# Patient Record
Sex: FEMALE | Race: BLACK OR AFRICAN AMERICAN | NOT HISPANIC OR LATINO | Employment: UNEMPLOYED | RURAL
[De-identification: names, ages, dates, MRNs, and addresses within clinical notes are randomized per-mention and may not be internally consistent; named-entity substitution may affect disease eponyms.]

---

## 2019-10-08 ENCOUNTER — HISTORICAL (OUTPATIENT)
Dept: ADMINISTRATIVE | Facility: HOSPITAL | Age: 69
End: 2019-10-08

## 2019-10-14 LAB
LAB AP CLINICAL INFORMATION: NORMAL
LAB AP DIAGNOSIS - HISTORICAL: NORMAL
LAB AP GROSS PATHOLOGY - HISTORICAL: NORMAL
LAB AP SPECIMEN SUBMITTED - HISTORICAL: NORMAL

## 2022-03-28 ENCOUNTER — ANESTHESIA (OUTPATIENT)
Dept: SURGERY | Facility: HOSPITAL | Age: 72
End: 2022-03-28
Payer: MEDICARE

## 2022-03-28 ENCOUNTER — HOSPITAL ENCOUNTER (OUTPATIENT)
Facility: HOSPITAL | Age: 72
Discharge: HOME | End: 2022-03-29
Admitting: STUDENT IN AN ORGANIZED HEALTH CARE EDUCATION/TRAINING PROGRAM
Payer: MEDICARE

## 2022-03-28 ENCOUNTER — ANESTHESIA EVENT (OUTPATIENT)
Dept: SURGERY | Facility: HOSPITAL | Age: 72
End: 2022-03-28
Payer: MEDICARE

## 2022-03-28 DIAGNOSIS — Z01.818 PRE-OP EVALUATION: ICD-10-CM

## 2022-03-28 DIAGNOSIS — S91.309A WOUND OF FOOT: ICD-10-CM

## 2022-03-28 DIAGNOSIS — E11.628 DIABETIC FOOT INFECTION: Primary | ICD-10-CM

## 2022-03-28 DIAGNOSIS — L08.9 DIABETIC FOOT INFECTION: Primary | ICD-10-CM

## 2022-03-28 DIAGNOSIS — E11.9 DIABETES: ICD-10-CM

## 2022-03-28 LAB
ANION GAP SERPL CALCULATED.3IONS-SCNC: 14 MMOL/L (ref 7–16)
BASOPHILS # BLD AUTO: 0.04 K/UL (ref 0–0.2)
BASOPHILS NFR BLD AUTO: 0.4 % (ref 0–1)
BUN SERPL-MCNC: 20 MG/DL (ref 7–18)
BUN/CREAT SERPL: 20 (ref 6–20)
CALCIUM SERPL-MCNC: 8.7 MG/DL (ref 8.5–10.1)
CHLORIDE SERPL-SCNC: 101 MMOL/L (ref 98–107)
CO2 SERPL-SCNC: 25 MMOL/L (ref 21–32)
CREAT SERPL-MCNC: 1 MG/DL (ref 0.55–1.02)
DIFFERENTIAL METHOD BLD: ABNORMAL
EOSINOPHIL # BLD AUTO: 0.03 K/UL (ref 0–0.5)
EOSINOPHIL NFR BLD AUTO: 0.3 % (ref 1–4)
ERYTHROCYTE [DISTWIDTH] IN BLOOD BY AUTOMATED COUNT: 13.8 % (ref 11.5–14.5)
GLUCOSE SERPL-MCNC: 194 MG/DL (ref 74–106)
GLUCOSE SERPL-MCNC: 196 MG/DL (ref 70–105)
GLUCOSE SERPL-MCNC: 205 MG/DL (ref 70–105)
HCT VFR BLD AUTO: 33.7 % (ref 38–47)
HGB BLD-MCNC: 10.8 G/DL (ref 12–16)
IMM GRANULOCYTES # BLD AUTO: 0.13 K/UL (ref 0–0.04)
IMM GRANULOCYTES NFR BLD: 1.4 % (ref 0–0.4)
LYMPHOCYTES # BLD AUTO: 1.3 K/UL (ref 1–4.8)
LYMPHOCYTES NFR BLD AUTO: 14.3 % (ref 27–41)
MCH RBC QN AUTO: 26.9 PG (ref 27–31)
MCHC RBC AUTO-ENTMCNC: 32 G/DL (ref 32–36)
MCV RBC AUTO: 83.8 FL (ref 80–96)
MONOCYTES # BLD AUTO: 0.84 K/UL (ref 0–0.8)
MONOCYTES NFR BLD AUTO: 9.3 % (ref 2–6)
MPC BLD CALC-MCNC: 9.3 FL (ref 9.4–12.4)
NEUTROPHILS # BLD AUTO: 6.73 K/UL (ref 1.8–7.7)
NEUTROPHILS NFR BLD AUTO: 74.3 % (ref 53–65)
NRBC # BLD AUTO: 0 X10E3/UL
NRBC, AUTO (.00): 0 %
PLATELET # BLD AUTO: 377 K/UL (ref 150–400)
POTASSIUM SERPL-SCNC: 4.4 MMOL/L (ref 3.5–5.1)
RBC # BLD AUTO: 4.02 M/UL (ref 4.2–5.4)
SARS-COV-2 RDRP RESP QL NAA+PROBE: NEGATIVE
SODIUM SERPL-SCNC: 136 MMOL/L (ref 136–145)
WBC # BLD AUTO: 9.07 K/UL (ref 4.5–11)

## 2022-03-28 PROCEDURE — 88305 TISSUE EXAM BY PATHOLOGIST: CPT | Mod: SUR | Performed by: STUDENT IN AN ORGANIZED HEALTH CARE EDUCATION/TRAINING PROGRAM

## 2022-03-28 PROCEDURE — 36000706: Performed by: STUDENT IN AN ORGANIZED HEALTH CARE EDUCATION/TRAINING PROGRAM

## 2022-03-28 PROCEDURE — 37000008 HC ANESTHESIA 1ST 15 MINUTES: Performed by: STUDENT IN AN ORGANIZED HEALTH CARE EDUCATION/TRAINING PROGRAM

## 2022-03-28 PROCEDURE — D9220A PRA ANESTHESIA: ICD-10-PCS | Mod: ANES,ICN,, | Performed by: ANESTHESIOLOGY

## 2022-03-28 PROCEDURE — 27000284 HC CANNULA NASAL: Performed by: ANESTHESIOLOGY

## 2022-03-28 PROCEDURE — 99214 PR OFFICE/OUTPT VISIT, EST, LEVL IV, 30-39 MIN: ICD-10-PCS | Mod: 57,,, | Performed by: STUDENT IN AN ORGANIZED HEALTH CARE EDUCATION/TRAINING PROGRAM

## 2022-03-28 PROCEDURE — 99284 EMERGENCY DEPT VISIT MOD MDM: CPT | Mod: ,,, | Performed by: NURSE PRACTITIONER

## 2022-03-28 PROCEDURE — 82962 GLUCOSE BLOOD TEST: CPT

## 2022-03-28 PROCEDURE — 28810 PR AMPUTATION METATARSAL+TOE,SINGLE: ICD-10-PCS | Mod: T1,,, | Performed by: STUDENT IN AN ORGANIZED HEALTH CARE EDUCATION/TRAINING PROGRAM

## 2022-03-28 PROCEDURE — 99285 EMERGENCY DEPT VISIT HI MDM: CPT | Mod: 25

## 2022-03-28 PROCEDURE — 63600175 PHARM REV CODE 636 W HCPCS: Performed by: NURSE PRACTITIONER

## 2022-03-28 PROCEDURE — 85025 COMPLETE CBC W/AUTO DIFF WBC: CPT | Performed by: NURSE PRACTITIONER

## 2022-03-28 PROCEDURE — D9220A PRA ANESTHESIA: Mod: CRNA,,, | Performed by: ANESTHESIOLOGY

## 2022-03-28 PROCEDURE — 27201423 OPTIME MED/SURG SUP & DEVICES STERILE SUPPLY: Performed by: STUDENT IN AN ORGANIZED HEALTH CARE EDUCATION/TRAINING PROGRAM

## 2022-03-28 PROCEDURE — 87635 SARS-COV-2 COVID-19 AMP PRB: CPT | Performed by: NURSE PRACTITIONER

## 2022-03-28 PROCEDURE — 88305 TISSUE EXAM BY PATHOLOGIST: CPT | Mod: 26,,, | Performed by: PATHOLOGY

## 2022-03-28 PROCEDURE — 99214 OFFICE O/P EST MOD 30 MIN: CPT | Mod: 57,,, | Performed by: STUDENT IN AN ORGANIZED HEALTH CARE EDUCATION/TRAINING PROGRAM

## 2022-03-28 PROCEDURE — 80048 BASIC METABOLIC PNL TOTAL CA: CPT | Performed by: NURSE PRACTITIONER

## 2022-03-28 PROCEDURE — 25000003 PHARM REV CODE 250: Performed by: NURSE PRACTITIONER

## 2022-03-28 PROCEDURE — 99284 PR EMERGENCY DEPT VISIT,LEVEL IV: ICD-10-PCS | Mod: ,,, | Performed by: NURSE PRACTITIONER

## 2022-03-28 PROCEDURE — 36000707: Performed by: STUDENT IN AN ORGANIZED HEALTH CARE EDUCATION/TRAINING PROGRAM

## 2022-03-28 PROCEDURE — 36415 COLL VENOUS BLD VENIPUNCTURE: CPT | Performed by: NURSE PRACTITIONER

## 2022-03-28 PROCEDURE — 25000003 PHARM REV CODE 250: Performed by: ANESTHESIOLOGY

## 2022-03-28 PROCEDURE — D9220A PRA ANESTHESIA: Mod: ANES,ICN,, | Performed by: ANESTHESIOLOGY

## 2022-03-28 PROCEDURE — 28810 AMPUTATION TOE & METATARSAL: CPT | Mod: T1,,, | Performed by: STUDENT IN AN ORGANIZED HEALTH CARE EDUCATION/TRAINING PROGRAM

## 2022-03-28 PROCEDURE — 88311 SURGICAL PATHOLOGY: ICD-10-PCS | Mod: 26,,, | Performed by: PATHOLOGY

## 2022-03-28 PROCEDURE — 88311 DECALCIFY TISSUE: CPT | Mod: 26,,, | Performed by: PATHOLOGY

## 2022-03-28 PROCEDURE — 88305 SURGICAL PATHOLOGY: ICD-10-PCS | Mod: 26,,, | Performed by: PATHOLOGY

## 2022-03-28 PROCEDURE — 88305 TISSUE EXAM BY PATHOLOGIST: CPT | Mod: 26,XU,, | Performed by: PATHOLOGY

## 2022-03-28 PROCEDURE — D9220A PRA ANESTHESIA: ICD-10-PCS | Mod: CRNA,,, | Performed by: ANESTHESIOLOGY

## 2022-03-28 PROCEDURE — 37000009 HC ANESTHESIA EA ADD 15 MINS: Performed by: STUDENT IN AN ORGANIZED HEALTH CARE EDUCATION/TRAINING PROGRAM

## 2022-03-28 PROCEDURE — 25000003 PHARM REV CODE 250: Performed by: STUDENT IN AN ORGANIZED HEALTH CARE EDUCATION/TRAINING PROGRAM

## 2022-03-28 PROCEDURE — 71000033 HC RECOVERY, INTIAL HOUR: Performed by: STUDENT IN AN ORGANIZED HEALTH CARE EDUCATION/TRAINING PROGRAM

## 2022-03-28 PROCEDURE — 63600175 PHARM REV CODE 636 W HCPCS: Performed by: ANESTHESIOLOGY

## 2022-03-28 RX ORDER — TRAMADOL HYDROCHLORIDE 50 MG/1
50 TABLET ORAL EVERY 6 HOURS PRN
COMMUNITY
End: 2022-10-17

## 2022-03-28 RX ORDER — ONDANSETRON 2 MG/ML
4 INJECTION INTRAMUSCULAR; INTRAVENOUS EVERY 12 HOURS PRN
Status: DISCONTINUED | OUTPATIENT
Start: 2022-03-28 | End: 2022-03-29 | Stop reason: HOSPADM

## 2022-03-28 RX ORDER — ESTRADIOL 0.5 MG/1
0.5 TABLET ORAL DAILY
COMMUNITY

## 2022-03-28 RX ORDER — GABAPENTIN 300 MG/1
600 CAPSULE ORAL 3 TIMES DAILY
Status: DISCONTINUED | OUTPATIENT
Start: 2022-03-28 | End: 2022-03-29 | Stop reason: HOSPADM

## 2022-03-28 RX ORDER — SODIUM CHLORIDE 9 MG/ML
INJECTION, SOLUTION INTRAVENOUS CONTINUOUS
Status: DISCONTINUED | OUTPATIENT
Start: 2022-03-28 | End: 2022-03-29 | Stop reason: HOSPADM

## 2022-03-28 RX ORDER — GLUCAGON 1 MG
1 KIT INJECTION
Status: DISCONTINUED | OUTPATIENT
Start: 2022-03-28 | End: 2022-03-29 | Stop reason: HOSPADM

## 2022-03-28 RX ORDER — OXYCODONE HYDROCHLORIDE 5 MG/1
5 TABLET ORAL
Status: DISCONTINUED | OUTPATIENT
Start: 2022-03-28 | End: 2022-03-28 | Stop reason: HOSPADM

## 2022-03-28 RX ORDER — SODIUM CHLORIDE, SODIUM LACTATE, POTASSIUM CHLORIDE, CALCIUM CHLORIDE 600; 310; 30; 20 MG/100ML; MG/100ML; MG/100ML; MG/100ML
INJECTION, SOLUTION INTRAVENOUS CONTINUOUS
Status: DISCONTINUED | OUTPATIENT
Start: 2022-03-28 | End: 2022-03-28

## 2022-03-28 RX ORDER — HEPARIN SODIUM 5000 [USP'U]/ML
5000 INJECTION, SOLUTION INTRAVENOUS; SUBCUTANEOUS EVERY 8 HOURS
Status: DISCONTINUED | OUTPATIENT
Start: 2022-03-28 | End: 2022-03-29 | Stop reason: HOSPADM

## 2022-03-28 RX ORDER — ATORVASTATIN CALCIUM 10 MG/1
10 TABLET, FILM COATED ORAL DAILY
Status: DISCONTINUED | OUTPATIENT
Start: 2022-03-28 | End: 2022-03-29 | Stop reason: HOSPADM

## 2022-03-28 RX ORDER — PREDNISOLONE ACETATE 10 MG/ML
1 SUSPENSION/ DROPS OPHTHALMIC 2 TIMES DAILY
Status: DISCONTINUED | OUTPATIENT
Start: 2022-03-28 | End: 2022-03-29 | Stop reason: HOSPADM

## 2022-03-28 RX ORDER — INSULIN GLARGINE 100 [IU]/ML
60 INJECTION, SOLUTION SUBCUTANEOUS
COMMUNITY
End: 2022-10-17 | Stop reason: SDUPTHER

## 2022-03-28 RX ORDER — LIDOCAINE HYDROCHLORIDE 20 MG/ML
INJECTION, SOLUTION EPIDURAL; INFILTRATION; INTRACAUDAL; PERINEURAL
Status: DISCONTINUED | OUTPATIENT
Start: 2022-03-28 | End: 2022-03-28

## 2022-03-28 RX ORDER — LORATADINE 10 MG/1
10 TABLET ORAL DAILY
COMMUNITY

## 2022-03-28 RX ORDER — GABAPENTIN 600 MG/1
600 TABLET ORAL 3 TIMES DAILY
COMMUNITY
End: 2023-05-09 | Stop reason: SDUPTHER

## 2022-03-28 RX ORDER — KETOROLAC TROMETHAMINE 30 MG/ML
15 INJECTION, SOLUTION INTRAMUSCULAR; INTRAVENOUS EVERY 6 HOURS PRN
Status: DISCONTINUED | OUTPATIENT
Start: 2022-03-28 | End: 2022-03-29 | Stop reason: HOSPADM

## 2022-03-28 RX ORDER — METRONIDAZOLE 500 MG/1
500 TABLET ORAL 3 TIMES DAILY
COMMUNITY
End: 2022-10-17

## 2022-03-28 RX ORDER — CEFAZOLIN SODIUM 1 G/3ML
INJECTION, POWDER, FOR SOLUTION INTRAMUSCULAR; INTRAVENOUS
Status: DISCONTINUED | OUTPATIENT
Start: 2022-03-28 | End: 2022-03-28

## 2022-03-28 RX ORDER — ACETAMINOPHEN 325 MG/1
650 TABLET ORAL EVERY 4 HOURS PRN
Status: DISCONTINUED | OUTPATIENT
Start: 2022-03-28 | End: 2022-03-29 | Stop reason: HOSPADM

## 2022-03-28 RX ORDER — ESTRADIOL 0.5 MG/1
0.5 TABLET ORAL DAILY
Status: DISCONTINUED | OUTPATIENT
Start: 2022-03-28 | End: 2022-03-29 | Stop reason: HOSPADM

## 2022-03-28 RX ORDER — FENTANYL CITRATE 50 UG/ML
INJECTION, SOLUTION INTRAMUSCULAR; INTRAVENOUS
Status: DISCONTINUED | OUTPATIENT
Start: 2022-03-28 | End: 2022-03-28

## 2022-03-28 RX ORDER — BUPIVACAINE HYDROCHLORIDE 2.5 MG/ML
INJECTION, SOLUTION EPIDURAL; INFILTRATION; INTRACAUDAL
Status: DISCONTINUED | OUTPATIENT
Start: 2022-03-28 | End: 2022-03-28 | Stop reason: HOSPADM

## 2022-03-28 RX ORDER — INSULIN ASPART 100 [IU]/ML
1-10 INJECTION, SOLUTION INTRAVENOUS; SUBCUTANEOUS
Status: DISCONTINUED | OUTPATIENT
Start: 2022-03-28 | End: 2022-03-29 | Stop reason: HOSPADM

## 2022-03-28 RX ORDER — ONDANSETRON 2 MG/ML
4 INJECTION INTRAMUSCULAR; INTRAVENOUS DAILY PRN
Status: DISCONTINUED | OUTPATIENT
Start: 2022-03-28 | End: 2022-03-28 | Stop reason: HOSPADM

## 2022-03-28 RX ORDER — MEPERIDINE HYDROCHLORIDE 25 MG/ML
25 INJECTION INTRAMUSCULAR; INTRAVENOUS; SUBCUTANEOUS EVERY 10 MIN PRN
Status: DISCONTINUED | OUTPATIENT
Start: 2022-03-28 | End: 2022-03-28 | Stop reason: HOSPADM

## 2022-03-28 RX ORDER — METOCLOPRAMIDE HYDROCHLORIDE 5 MG/ML
5 INJECTION INTRAMUSCULAR; INTRAVENOUS EVERY 6 HOURS PRN
Status: DISCONTINUED | OUTPATIENT
Start: 2022-03-28 | End: 2022-03-29 | Stop reason: HOSPADM

## 2022-03-28 RX ORDER — DICLOFENAC SODIUM AND MISOPROSTOL 75; 200 MG/1; UG/1
1 TABLET, DELAYED RELEASE ORAL 2 TIMES DAILY
COMMUNITY

## 2022-03-28 RX ORDER — METFORMIN HYDROCHLORIDE 1000 MG/1
1000 TABLET ORAL 2 TIMES DAILY WITH MEALS
COMMUNITY

## 2022-03-28 RX ORDER — MIDAZOLAM HYDROCHLORIDE 1 MG/ML
INJECTION INTRAMUSCULAR; INTRAVENOUS
Status: DISCONTINUED | OUTPATIENT
Start: 2022-03-28 | End: 2022-03-28

## 2022-03-28 RX ORDER — SIMVASTATIN 40 MG/1
40 TABLET, FILM COATED ORAL NIGHTLY
COMMUNITY
End: 2022-10-17

## 2022-03-28 RX ORDER — MORPHINE SULFATE 10 MG/ML
4 INJECTION INTRAMUSCULAR; INTRAVENOUS; SUBCUTANEOUS EVERY 5 MIN PRN
Status: DISCONTINUED | OUTPATIENT
Start: 2022-03-28 | End: 2022-03-28 | Stop reason: HOSPADM

## 2022-03-28 RX ORDER — TALC
6 POWDER (GRAM) TOPICAL NIGHTLY PRN
Status: DISCONTINUED | OUTPATIENT
Start: 2022-03-28 | End: 2022-03-29 | Stop reason: HOSPADM

## 2022-03-28 RX ORDER — TRAMADOL HYDROCHLORIDE 50 MG/1
50 TABLET ORAL EVERY 6 HOURS PRN
Status: DISCONTINUED | OUTPATIENT
Start: 2022-03-28 | End: 2022-03-29 | Stop reason: HOSPADM

## 2022-03-28 RX ORDER — MORPHINE SULFATE 4 MG/ML
4 INJECTION, SOLUTION INTRAMUSCULAR; INTRAVENOUS EVERY 4 HOURS PRN
Status: DISCONTINUED | OUTPATIENT
Start: 2022-03-28 | End: 2022-03-29 | Stop reason: HOSPADM

## 2022-03-28 RX ORDER — LEVOFLOXACIN 500 MG/1
500 TABLET, FILM COATED ORAL DAILY
COMMUNITY
End: 2022-10-17

## 2022-03-28 RX ORDER — ACETAMINOPHEN 325 MG/1
650 TABLET ORAL EVERY 8 HOURS PRN
Status: DISCONTINUED | OUTPATIENT
Start: 2022-03-28 | End: 2022-03-29 | Stop reason: HOSPADM

## 2022-03-28 RX ORDER — DIPHENHYDRAMINE HYDROCHLORIDE 50 MG/ML
25 INJECTION INTRAMUSCULAR; INTRAVENOUS EVERY 6 HOURS PRN
Status: DISCONTINUED | OUTPATIENT
Start: 2022-03-28 | End: 2022-03-28 | Stop reason: HOSPADM

## 2022-03-28 RX ORDER — HYDROMORPHONE HYDROCHLORIDE 2 MG/ML
0.5 INJECTION, SOLUTION INTRAMUSCULAR; INTRAVENOUS; SUBCUTANEOUS EVERY 5 MIN PRN
Status: DISCONTINUED | OUTPATIENT
Start: 2022-03-28 | End: 2022-03-28 | Stop reason: HOSPADM

## 2022-03-28 RX ORDER — PROPOFOL 10 MG/ML
VIAL (ML) INTRAVENOUS
Status: DISCONTINUED | OUTPATIENT
Start: 2022-03-28 | End: 2022-03-28

## 2022-03-28 RX ADMIN — GABAPENTIN 600 MG: 300 CAPSULE ORAL at 08:03

## 2022-03-28 RX ADMIN — MIDAZOLAM 1 MG: 1 INJECTION INTRAMUSCULAR; INTRAVENOUS at 03:03

## 2022-03-28 RX ADMIN — PROPOFOL 25 MG: 10 INJECTION, EMULSION INTRAVENOUS at 04:03

## 2022-03-28 RX ADMIN — FENTANYL CITRATE 50 MCG: 50 INJECTION INTRAMUSCULAR; INTRAVENOUS at 04:03

## 2022-03-28 RX ADMIN — PIPERACILLIN AND TAZOBACTAM 4.5 G: 4; .5 INJECTION, POWDER, LYOPHILIZED, FOR SOLUTION INTRAVENOUS; PARENTERAL at 06:03

## 2022-03-28 RX ADMIN — PROPOFOL 50 MG: 10 INJECTION, EMULSION INTRAVENOUS at 03:03

## 2022-03-28 RX ADMIN — HEPARIN SODIUM 5000 UNITS: 5000 INJECTION INTRAVENOUS; SUBCUTANEOUS at 11:03

## 2022-03-28 RX ADMIN — SODIUM CHLORIDE: 9 INJECTION, SOLUTION INTRAVENOUS at 02:03

## 2022-03-28 RX ADMIN — LIDOCAINE HYDROCHLORIDE 50 MG: 20 INJECTION, SOLUTION INTRAVENOUS at 03:03

## 2022-03-28 RX ADMIN — CEFAZOLIN 2000 G: 1 INJECTION, POWDER, FOR SOLUTION INTRAMUSCULAR; INTRAVENOUS; PARENTERAL at 03:03

## 2022-03-28 RX ADMIN — FENTANYL CITRATE 50 MCG: 50 INJECTION INTRAMUSCULAR; INTRAVENOUS at 03:03

## 2022-03-28 RX ADMIN — DEXTROSE MONOHYDRATE 25 G: 25 INJECTION, SOLUTION INTRAVENOUS at 04:03

## 2022-03-28 RX ADMIN — SODIUM CHLORIDE: 9 INJECTION, SOLUTION INTRAVENOUS at 06:03

## 2022-03-28 NOTE — PROVIDER PROGRESS NOTES - EMERGENCY DEPT.
Encounter Date: 3/28/2022    ED Physician Progress Notes        discussed with ; he will evaluate the patient in the ED

## 2022-03-28 NOTE — TRANSFER OF CARE
"Anesthesia Transfer of Care Note    Patient: Nicol Reese    Procedure(s) Performed: Procedure(s) (LRB):  DEBRIDEMENT, LOWER EXTREMITY (Left)  AMPUTATION, TOE (Left)    Patient location: GI    Anesthesia Type: general    Transport from OR: Transported from OR on room air with adequate spontaneous ventilation    Post pain: adequate analgesia    Post assessment: no apparent anesthetic complications    Post vital signs: stable    Level of consciousness: responds to stimulation    Nausea/Vomiting: no nausea/vomiting    Complications: none    Transfer of care protocol was followed      Last vitals:   Visit Vitals  BP (!) 144/65 (BP Location: Left arm, Patient Position: Lying)   Pulse 84   Temp 36.9 °C (98.4 °F) (Oral)   Resp 14   Ht 5' 6" (1.676 m)   Wt 80.3 kg (177 lb)   SpO2 95%   Breastfeeding No   BMI 28.57 kg/m²     "

## 2022-03-28 NOTE — PROVIDER PROGRESS NOTES - EMERGENCY DEPT.
Encounter Date: 3/28/2022    ED Physician Progress Notes         at bedside, evaluated the patient, will take to surgery

## 2022-03-28 NOTE — PLAN OF CARE
1628 Rec'd pt to PACU asleep with oral airway in place. VSS. No signs of distress noted. Left foot dressing c/d/i.     1630 Oral airway removed. Respiration even and unlabored. Denies needs.     1638 Accu check 41. Dr. Johns notified and 1 amp d50 given, see MAR.    1655 Recheck accu check 196.     1708 Out of PACU. VSS. No signs of distress noted.     1715 Pt to room 449. Report given to TIFFANY Orellana RN. Left foot dressing c/d/i. No signs of distress noted. /69, P 76, R 18, O2 96% room air.

## 2022-03-28 NOTE — H&P
[facility] - Emergency Department  General Surgery  History & Physical    Patient Name: Nicol Reese  MRN: [id number]  Admission Date: 3/28/2022  Attending Physician: No att. providers found   Primary Care Provider: Alex Babcock MD    Patient information was obtained from ER records.     Subjective:     Chief Complaint/Reason for Admission: LEFT diabetic foot infection    History of Present Illness: 71-year-old female with diabetes left lower extremity edema cellulitis with infected 2nd toe general surgery consulted for surgical management  Glucose 200s, last ate at 0900      No current facility-administered medications on file prior to encounter.     Current Outpatient Medications on File Prior to Encounter   Medication Sig    diclofenac-misoprostol  mg-mcg (ARTHROTEC 75)  mg-mcg per tablet Take 1 tablet by mouth 2 (two) times daily.    estradioL (ESTRACE) 0.5 MG tablet Take 0.5 mg by mouth once daily.    gabapentin (NEURONTIN) 600 MG tablet Take 600 mg by mouth 3 (three) times daily.    insulin (LANTUS SOLOSTAR U-100 INSULIN) glargine 100 units/mL (3mL) SubQ pen Inject 68 Units into the skin.    levoFLOXacin (LEVAQUIN) 500 MG tablet Take 500 mg by mouth once daily.    loratadine (CLARITIN) 10 mg tablet Take 10 mg by mouth once daily.    metFORMIN (GLUCOPHAGE) 1000 MG tablet Take 1,000 mg by mouth 2 (two) times daily with meals.    metroNIDAZOLE (FLAGYL) 500 MG tablet Take 500 mg by mouth 3 (three) times daily.    prednisoLONE acetate (PRED FORTE) 1 % DrpS instill ONE (1) drop into right eye twice daily     simvastatin (ZOCOR) 40 MG tablet Take 40 mg by mouth every evening.    traMADoL (ULTRAM) 50 mg tablet Take 50 mg by mouth every 6 (six) hours as needed for Pain.       Review of patient's allergies indicates:  No Known Allergies    History reviewed. No pertinent past medical history.  History reviewed. No pertinent surgical history.  Family History    None       Tobacco Use     Smoking status: Never Smoker    Smokeless tobacco: Never Used   Substance and Sexual Activity    Alcohol use: Not Currently    Drug use: Not Currently    Sexual activity: Not on file     Review of Systems   Cardiovascular:  Positive for leg swelling.        LLE   Objective:     Vital Signs (Most Recent):  Temp: 98.8 °F (37.1 °C) (03/28/22 1049)  Pulse: 94 (03/28/22 1049)  Resp: 18 (03/28/22 1049)  BP: 137/63 (03/28/22 1049)  SpO2: 98 % (03/28/22 1049) Vital Signs (24h Range):  Temp:  [98.8 °F (37.1 °C)] 98.8 °F (37.1 °C)  Pulse:  [94] 94  Resp:  [18] 18  SpO2:  [98 %] 98 %  BP: (137)/(63) 137/63     Weight: 80.3 kg (177 lb)  Body mass index is 28.57 kg/m².    Physical Exam  Vitals and nursing note reviewed. Exam conducted with a chaperone present.   HENT:      Head: Normocephalic.   Eyes:      Conjunctiva/sclera: Conjunctivae normal.   Cardiovascular:      Rate and Rhythm: Normal rate.      Comments: Palpable BL DP pulses  Pulmonary:      Effort: Pulmonary effort is normal.   Abdominal:      Palpations: Abdomen is soft.   Musculoskeletal:      Left lower leg: Edema present.      Comments: Left 2nd toe edematous infectious  cellulitis   Skin:     General: Skin is warm and dry.      Capillary Refill: Capillary refill takes less than 2 seconds.   Neurological:      Mental Status: She is alert and oriented to person, place, and time.       Significant Labs:  I have reviewed all pertinent lab results within the past 24 hours.  CBC:   Recent Labs   Lab 03/28/22  1116   WBC 9.07   RBC 4.02*   HGB 10.8*   HCT 33.7*      MCV 83.8   MCH 26.9*   MCHC 32.0     BMP:   Recent Labs   Lab 03/28/22  1116   *      K 4.4      CO2 25   BUN 20*   CREATININE 1.00   CALCIUM 8.7     CMP:   Recent Labs   Lab 03/28/22  1116   *   CALCIUM 8.7      K 4.4   CO2 25      BUN 20*   CREATININE 1.00     LFTs: No results for input(s): ALT, AST, ALKPHOS, BILITOT, PROT, ALBUMIN in the last 168  hours.  Coagulation: No results for input(s): LABPROT, INR, APTT in the last 168 hours.  Cardiac markers: No results for input(s): CKMB, CPKMB, TROPONINT, TROPONINI, MYOGLOBIN in the last 168 hours.  ABGs: No results for input(s): PH, PCO2, PO2, HCO3, POCSATURATED, BE in the last 168 hours.    Significant Diagnostics:  I have reviewed all pertinent imaging results/findings within the past 24 hours.      Assessment/Plan:     * Diabetic foot infection  03/28 palpable DP pulses, edematous LLE cellulitis   IV zosyn, TO OR this afternoon for possible left 2nd toe amputation per Dr. Jacome    Diabetes  03/28 hyperglycemia 200, adult sliding scale      VTE Risk Mitigation (From admission, onward)         Ordered     heparin (porcine) injection 5,000 Units  Every 8 hours         03/28/22 1256     IP VTE HIGH RISK PATIENT  Once         03/28/22 1256     Place sequential compression device  Until discontinued         03/28/22 1256                Marcie Ramos, ANYP  General Surgery  [facility] [place] - Emergency Department

## 2022-03-28 NOTE — PLAN OF CARE
Problem: Adult Inpatient Plan of Care  Goal: Plan of Care Review  Outcome: Ongoing, Progressing  Goal: Patient-Specific Goal (Individualized)  Outcome: Ongoing, Progressing  Goal: Absence of Hospital-Acquired Illness or Injury  Outcome: Ongoing, Progressing  Goal: Optimal Comfort and Wellbeing  Outcome: Ongoing, Progressing  Goal: Readiness for Transition of Care  Outcome: Ongoing, Progressing     Problem: Diabetes Comorbidity  Goal: Blood Glucose Level Within Targeted Range  Outcome: Ongoing, Progressing     Problem: Fall Injury Risk  Goal: Absence of Fall and Fall-Related Injury  Outcome: Ongoing, Progressing    Patient progressing, will continue to monitor.

## 2022-03-28 NOTE — SUBJECTIVE & OBJECTIVE
No current facility-administered medications on file prior to encounter.     Current Outpatient Medications on File Prior to Encounter   Medication Sig    diclofenac-misoprostol  mg-mcg (ARTHROTEC 75)  mg-mcg per tablet Take 1 tablet by mouth 2 (two) times daily.    estradioL (ESTRACE) 0.5 MG tablet Take 0.5 mg by mouth once daily.    gabapentin (NEURONTIN) 600 MG tablet Take 600 mg by mouth 3 (three) times daily.    insulin (LANTUS SOLOSTAR U-100 INSULIN) glargine 100 units/mL (3mL) SubQ pen Inject 68 Units into the skin.    levoFLOXacin (LEVAQUIN) 500 MG tablet Take 500 mg by mouth once daily.    loratadine (CLARITIN) 10 mg tablet Take 10 mg by mouth once daily.    metFORMIN (GLUCOPHAGE) 1000 MG tablet Take 1,000 mg by mouth 2 (two) times daily with meals.    metroNIDAZOLE (FLAGYL) 500 MG tablet Take 500 mg by mouth 3 (three) times daily.    prednisoLONE acetate (PRED FORTE) 1 % DrpS instill ONE (1) drop into right eye twice daily     simvastatin (ZOCOR) 40 MG tablet Take 40 mg by mouth every evening.    traMADoL (ULTRAM) 50 mg tablet Take 50 mg by mouth every 6 (six) hours as needed for Pain.       Review of patient's allergies indicates:  No Known Allergies    History reviewed. No pertinent past medical history.  History reviewed. No pertinent surgical history.  Family History    None       Tobacco Use    Smoking status: Never Smoker    Smokeless tobacco: Never Used   Substance and Sexual Activity    Alcohol use: Not Currently    Drug use: Not Currently    Sexual activity: Not on file     Review of Systems   Cardiovascular:  Positive for leg swelling.        LLE   Objective:     Vital Signs (Most Recent):  Temp: 98.8 °F (37.1 °C) (03/28/22 1049)  Pulse: 94 (03/28/22 1049)  Resp: 18 (03/28/22 1049)  BP: 137/63 (03/28/22 1049)  SpO2: 98 % (03/28/22 1049) Vital Signs (24h Range):  Temp:  [98.8 °F (37.1 °C)] 98.8 °F (37.1 °C)  Pulse:  [94] 94  Resp:  [18] 18  SpO2:  [98 %] 98 %  BP: (137)/(63) 137/63      Weight: 80.3 kg (177 lb)  Body mass index is 28.57 kg/m².    Physical Exam  Vitals and nursing note reviewed. Exam conducted with a chaperone present.   HENT:      Head: Normocephalic.   Eyes:      Conjunctiva/sclera: Conjunctivae normal.   Cardiovascular:      Rate and Rhythm: Normal rate.      Comments: Palpable BL DP pulses  Pulmonary:      Effort: Pulmonary effort is normal.   Abdominal:      Palpations: Abdomen is soft.   Musculoskeletal:      Left lower leg: Edema present.      Comments: Left 2nd toe edematous infectious  cellulitis   Skin:     General: Skin is warm and dry.      Capillary Refill: Capillary refill takes less than 2 seconds.   Neurological:      Mental Status: She is alert and oriented to person, place, and time.       Significant Labs:  I have reviewed all pertinent lab results within the past 24 hours.  CBC:   Recent Labs   Lab 03/28/22  1116   WBC 9.07   RBC 4.02*   HGB 10.8*   HCT 33.7*      MCV 83.8   MCH 26.9*   MCHC 32.0     BMP:   Recent Labs   Lab 03/28/22  1116   *      K 4.4      CO2 25   BUN 20*   CREATININE 1.00   CALCIUM 8.7     CMP:   Recent Labs   Lab 03/28/22  1116   *   CALCIUM 8.7      K 4.4   CO2 25      BUN 20*   CREATININE 1.00     LFTs: No results for input(s): ALT, AST, ALKPHOS, BILITOT, PROT, ALBUMIN in the last 168 hours.  Coagulation: No results for input(s): LABPROT, INR, APTT in the last 168 hours.  Cardiac markers: No results for input(s): CKMB, CPKMB, TROPONINT, TROPONINI, MYOGLOBIN in the last 168 hours.  ABGs: No results for input(s): PH, PCO2, PO2, HCO3, POCSATURATED, BE in the last 168 hours.    Significant Diagnostics:  I have reviewed all pertinent imaging results/findings within the past 24 hours.

## 2022-03-28 NOTE — ED PROVIDER NOTES
Encounter Date: 3/28/2022       History     Chief Complaint   Patient presents with    Toe Pain     71-year-old female presents to the emergency department to be evaluating for a wound to her left second test that she noticed three days ago. She was evaluated at [facility] two days ago and given a prescription for Flagyl and Levaquin that she began taking yesterday. Denies any injury, fever, chills, nausea, vomiting.    The history is provided by the patient and a relative.   Toe Pain  This is a new problem. The current episode started more than 2 days ago. Pertinent negatives include no chest pain, no abdominal pain, no headaches and no shortness of breath.     Review of patient's allergies indicates:  No Known Allergies  History reviewed. No pertinent past medical history.  History reviewed. No pertinent surgical history.  History reviewed. No pertinent family history.  Social History     Tobacco Use    Smoking status: Never Smoker    Smokeless tobacco: Never Used   Substance Use Topics    Alcohol use: Not Currently    Drug use: Not Currently     Review of Systems   Constitutional: Negative for chills and fever.   Respiratory: Negative for shortness of breath.    Cardiovascular: Negative for chest pain.   Gastrointestinal: Negative for abdominal pain.   Neurological: Negative for headaches.   All other systems reviewed and are negative.      Physical Exam     Initial Vitals [03/28/22 1049]   BP Pulse Resp Temp SpO2   137/63 94 18 98.8 °F (37.1 °C) 98 %      MAP       --         Physical Exam    Vitals reviewed.  Constitutional: She appears well-developed and well-nourished.   Neck: Neck supple.   Cardiovascular: Normal rate and regular rhythm.   Pulmonary/Chest: Breath sounds normal.   Abdominal: Abdomen is soft. Bowel sounds are normal. She exhibits no distension and no mass. There is no abdominal tenderness. There is no rebound and no guarding.   Musculoskeletal:         General: Normal  range of motion.      Cervical back: Neck supple.     Neurological: She is alert and oriented to person, place, and time. She has normal strength.   Skin: Skin is warm and dry. Capillary refill takes less than 2 seconds.   Blister to the distal left second toe with small amount of purulent drainage.    Psychiatric: She has a normal mood and affect.         Medical Screening Exam   See Full Note    ED Course   Procedures  Labs Reviewed   BASIC METABOLIC PANEL - Abnormal; Notable for the following components:       Result Value    Glucose 194 (*)     BUN 20 (*)     eGFR 58 (*)     All other components within normal limits   CBC WITH DIFFERENTIAL - Abnormal; Notable for the following components:    RBC 4.02 (*)     Hemoglobin 10.8 (*)     Hematocrit 33.7 (*)     MCH 26.9 (*)     MPV 9.3 (*)     Neutrophils % 74.3 (*)     Lymphocytes % 14.3 (*)     Monocytes % 9.3 (*)     Eosinophils % 0.3 (*)     Immature Granulocytes % 1.4 (*)     Monocytes, Absolute 0.84 (*)     Immature Granulocytes, Absolute 0.13 (*)     All other components within normal limits   POCT GLUCOSE MONITORING CONTINUOUS - Abnormal; Notable for the following components:    POC Glucose 205 (*)     All other components within normal limits   CBC W/ AUTO DIFFERENTIAL    Narrative:     The following orders were created for panel order CBC auto differential.  Procedure                               Abnormality         Status                     ---------                               -----------         ------                     CBC with Differential[[id number]]        Abnormal            Final result                 Please view results for these tests on the individual orders.          Imaging Results          X-Ray Foot Complete Left (Final result)  Result time 03/28/22 11:23:06    Final result by Chacorta Agrawal MD (03/28/22 11:23:06)                 Impression:      As above.      Electronically signed by: Chacorta Agrawal  Date:    03/28/2022  Time:    11:23              Narrative:    EXAMINATION:  XR FOOT COMPLETE 3 VIEW LEFT    CLINICAL HISTORY:  .  Unspecified open wound, unspecified foot, initial encounter    TECHNIQUE:  AP, lateral and oblique views of the left foot were performed.    COMPARISON:  None    FINDINGS:  There is no fracture.  Degenerative changes throughout.  Soft tissue swelling along dorsum of the foot and prominently involving the second toe distally.  Vascular calcifications.                                 Medications - No data to display                    Clinical Impression:   Final diagnoses:  [S91.309A] Wound of foot                 Daksha Barker, [place]  03/30/22 0739

## 2022-03-28 NOTE — HPI
71-year-old female with diabetes left lower extremity edema cellulitis with infected 2nd toe general surgery consulted for surgical management  Glucose 200s, last ate at 0900

## 2022-03-28 NOTE — ASSESSMENT & PLAN NOTE
03/28 palpable DP pulses, edematous LLE cellulitis   IV zosyn, TO OR this afternoon for possible left 2nd toe amputation per Dr. Jacome

## 2022-03-28 NOTE — OP NOTE
[facility] - Periop Services  Surgery Department  Operative Note    SUMMARY     Date of Procedure: 3/28/2022     Procedure: Procedure(s) (LRB):  DEBRIDEMENT, LOWER EXTREMITY (Left)  AMPUTATION, TOE (Left)     Surgeon(s) and Role:     * Kobi Jacome, DO - Primary    Assisting Surgeon: None    Pre-Operative Diagnosis: Wound of foot [S91.309A]    Post-Operative Diagnosis: Post-Op Diagnosis Codes:     * Wound of foot [S91.309A]    Anesthesia: General    Operative Findings (including complications, if any):  Necrotic skin and muscle extending the most distal tip of the distal left 2nd toe down to the plantar surface of the PIP joint; purulent drainage present;    Description of Technical Procedures:  Patient was taken the operating room area [place] does stretcher in the supine position.  Patient underwent general anesthesia per the anesthesia team.  The left foot was then prepped and draped in usual sterile fashion.  There was necrotic skin and muscle extending from the most distal tip of the left 2nd toe down to the plantar surface of the PIP joint with purulent drainage; at this time we elected to perform an amputation of the toe.  We performed a digital block the 2nd toe with 0.25% Marcaine plain.  We made an elliptical incision with electrocautery at the base of the left 2nd toe and dissected down through the subcutaneous tissue and down through muscle down to the 2nd metatarsophalangeal joint.  We transected the toe from the joint and sent to pathology.  Bleeding was controlled electrocautery.  We irrigated the cavity and all the skin and tissue was healthy with no signs of infection.  We closed the cavity with multiple 2-0 nylon horizontal mattress and simple suture.  Patient was awakened and taken the PACU stable condition.        Estimated Blood Loss (EBL): 5 cc           Implants: * No implants in log *    Specimens:   Specimen (24h ago, onward)             Start     Ordered    03/28/22 1335  Surgical  Pathology  RELEASE UPON ORDERING         03/28/22 2831                        Condition: Good    Disposition: PACU - hemodynamically stable.    Attestation: I was present and scrubbed for the entire procedure.

## 2022-03-28 NOTE — ANESTHESIA PREPROCEDURE EVALUATION
03/28/2022  Nicol Reese is a 71 y.o., female.      Pre-op Assessment    I have reviewed the Patient Summary Reports.    I have reviewed the NPO Status.   I have reviewed the Medications.     Review of Systems  Social:  Non-Smoker, No Alcohol Use    Hematology/Oncology:  Hematology Normal   Oncology Normal     EENT/Dental:EENT/Dental Normal   Cardiovascular:  Cardiovascular Normal     Pulmonary:  Pulmonary Normal    Renal/:  Renal/ Normal     Hepatic/GI:  Hepatic/GI Normal    Musculoskeletal:  Musculoskeletal Normal    Neurological:  Neurology Normal    Endocrine:   Diabetes, poorly controlled    Dermatological:  Skin Normal    Psych:  Psychiatric Normal           Physical Exam  General: Well nourished, Cooperative, Alert and Oriented    Airway:  Mallampati: II / II  Mouth Opening: Normal  TM Distance: Normal  Neck ROM: Normal ROM    Dental:  Intact    Chest/Lungs:  Clear to auscultation    Heart:  Rate: Normal  Rhythm: Regular Rhythm  Sounds: Normal        Chemistry        Component Value Date/Time     03/28/2022 1116    K 4.4 03/28/2022 1116     03/28/2022 1116    CO2 25 03/28/2022 1116    BUN 20 (H) 03/28/2022 1116    CREATININE 1.00 03/28/2022 1116     (H) 03/28/2022 1116        Component Value Date/Time    CALCIUM 8.7 03/28/2022 1116    EGFRNONAA 58 (L) 03/28/2022 1116        Lab Results   Component Value Date    WBC 9.07 03/28/2022    RBC 4.02 (L) 03/28/2022    HGB 10.8 (L) 03/28/2022    MCV 83.8 03/28/2022    MCH 26.9 (L) 03/28/2022    MCHC 32.0 03/28/2022    RDW 13.8 03/28/2022     03/28/2022    MPV 9.3 (L) 03/28/2022    LYMPH 14.3 (L) 03/28/2022    LYMPH 1.30 03/28/2022    MONO 9.3 (H) 03/28/2022    EOS 0.03 03/28/2022    BASO 0.04 03/28/2022     No results found for this or any previous visit.      Anesthesia Plan  Type of Anesthesia, risks & benefits  discussed:    Anesthesia Type: Gen ETT  Intra-op Monitoring Plan: Standard ASA Monitors  Post Op Pain Control Plan: multimodal analgesia  Induction:  IV  Airway Plan: Direct  Informed Consent: Informed consent signed with the Patient and all parties understand the risks and agree with anesthesia plan.  All questions answered.   ASA Score: 3  Day of Surgery Review of History & Physical: H&P Update referred to the surgeon/provider.    Ready For Surgery From Anesthesia Perspective.     .

## 2022-03-29 VITALS
TEMPERATURE: 99 F | BODY MASS INDEX: 29.76 KG/M2 | RESPIRATION RATE: 17 BRPM | WEIGHT: 185.19 LBS | OXYGEN SATURATION: 97 % | HEIGHT: 66 IN | DIASTOLIC BLOOD PRESSURE: 79 MMHG | SYSTOLIC BLOOD PRESSURE: 168 MMHG | HEART RATE: 80 BPM

## 2022-03-29 LAB
ANION GAP SERPL CALCULATED.3IONS-SCNC: 12 MMOL/L (ref 7–16)
BASOPHILS # BLD AUTO: 0.02 K/UL (ref 0–0.2)
BASOPHILS NFR BLD AUTO: 0.3 % (ref 0–1)
BUN SERPL-MCNC: 12 MG/DL (ref 7–18)
BUN/CREAT SERPL: 15 (ref 6–20)
CALCIUM SERPL-MCNC: 8.4 MG/DL (ref 8.5–10.1)
CHLORIDE SERPL-SCNC: 105 MMOL/L (ref 98–107)
CO2 SERPL-SCNC: 27 MMOL/L (ref 21–32)
CREAT SERPL-MCNC: 0.8 MG/DL (ref 0.55–1.02)
DIFFERENTIAL METHOD BLD: ABNORMAL
EOSINOPHIL # BLD AUTO: 0.07 K/UL (ref 0–0.5)
EOSINOPHIL NFR BLD AUTO: 1.2 % (ref 1–4)
ERYTHROCYTE [DISTWIDTH] IN BLOOD BY AUTOMATED COUNT: 14.2 % (ref 11.5–14.5)
GLUCOSE SERPL-MCNC: 122 MG/DL (ref 74–106)
GLUCOSE SERPL-MCNC: 171 MG/DL (ref 70–105)
GLUCOSE SERPL-MCNC: 216 MG/DL (ref 70–105)
GLUCOSE SERPL-MCNC: 41 MG/DL (ref 70–105)
GLUCOSE SERPL-MCNC: 69 MG/DL (ref 70–105)
HCT VFR BLD AUTO: 31.3 % (ref 38–47)
HGB BLD-MCNC: 10 G/DL (ref 12–16)
IMM GRANULOCYTES # BLD AUTO: 0.06 K/UL (ref 0–0.04)
IMM GRANULOCYTES NFR BLD: 1 % (ref 0–0.4)
LYMPHOCYTES # BLD AUTO: 1.31 K/UL (ref 1–4.8)
LYMPHOCYTES NFR BLD AUTO: 22.1 % (ref 27–41)
MCH RBC QN AUTO: 26.9 PG (ref 27–31)
MCHC RBC AUTO-ENTMCNC: 31.9 G/DL (ref 32–36)
MCV RBC AUTO: 84.1 FL (ref 80–96)
MONOCYTES # BLD AUTO: 0.54 K/UL (ref 0–0.8)
MONOCYTES NFR BLD AUTO: 9.1 % (ref 2–6)
MPC BLD CALC-MCNC: 9.6 FL (ref 9.4–12.4)
NEUTROPHILS # BLD AUTO: 3.93 K/UL (ref 1.8–7.7)
NEUTROPHILS NFR BLD AUTO: 66.3 % (ref 53–65)
NRBC # BLD AUTO: 0 X10E3/UL
NRBC, AUTO (.00): 0 %
PLATELET # BLD AUTO: 355 K/UL (ref 150–400)
POTASSIUM SERPL-SCNC: 4.2 MMOL/L (ref 3.5–5.1)
RBC # BLD AUTO: 3.72 M/UL (ref 4.2–5.4)
SODIUM SERPL-SCNC: 140 MMOL/L (ref 136–145)
WBC # BLD AUTO: 5.93 K/UL (ref 4.5–11)

## 2022-03-29 PROCEDURE — 63600175 PHARM REV CODE 636 W HCPCS: Performed by: STUDENT IN AN ORGANIZED HEALTH CARE EDUCATION/TRAINING PROGRAM

## 2022-03-29 PROCEDURE — 85025 COMPLETE CBC W/AUTO DIFF WBC: CPT | Performed by: NURSE PRACTITIONER

## 2022-03-29 PROCEDURE — 80048 BASIC METABOLIC PNL TOTAL CA: CPT | Performed by: NURSE PRACTITIONER

## 2022-03-29 PROCEDURE — 36415 COLL VENOUS BLD VENIPUNCTURE: CPT | Performed by: NURSE PRACTITIONER

## 2022-03-29 PROCEDURE — 25000003 PHARM REV CODE 250: Performed by: STUDENT IN AN ORGANIZED HEALTH CARE EDUCATION/TRAINING PROGRAM

## 2022-03-29 PROCEDURE — 97161 PT EVAL LOW COMPLEX 20 MIN: CPT

## 2022-03-29 PROCEDURE — 63600175 PHARM REV CODE 636 W HCPCS: Performed by: NURSE PRACTITIONER

## 2022-03-29 PROCEDURE — 82962 GLUCOSE BLOOD TEST: CPT

## 2022-03-29 PROCEDURE — 94761 N-INVAS EAR/PLS OXIMETRY MLT: CPT

## 2022-03-29 RX ORDER — AMOXICILLIN AND CLAVULANATE POTASSIUM 875; 125 MG/1; MG/1
1 TABLET, FILM COATED ORAL 2 TIMES DAILY
Qty: 12 TABLET | Refills: 0 | Status: SHIPPED | OUTPATIENT
Start: 2022-03-29 | End: 2022-10-17

## 2022-03-29 RX ORDER — HYDROCODONE BITARTRATE AND ACETAMINOPHEN 7.5; 325 MG/1; MG/1
1 TABLET ORAL EVERY 6 HOURS PRN
Qty: 15 TABLET | Refills: 0 | Status: SHIPPED | OUTPATIENT
Start: 2022-03-29

## 2022-03-29 RX ADMIN — HEPARIN SODIUM 5000 UNITS: 5000 INJECTION INTRAVENOUS; SUBCUTANEOUS at 05:03

## 2022-03-29 RX ADMIN — GABAPENTIN 600 MG: 300 CAPSULE ORAL at 10:03

## 2022-03-29 RX ADMIN — ATORVASTATIN CALCIUM 10 MG: 10 TABLET, FILM COATED ORAL at 10:03

## 2022-03-29 RX ADMIN — INSULIN ASPART 4 UNITS: 100 INJECTION, SOLUTION INTRAVENOUS; SUBCUTANEOUS at 12:03

## 2022-03-29 RX ADMIN — TRAMADOL HYDROCHLORIDE 50 MG: 50 TABLET, COATED ORAL at 10:03

## 2022-03-29 NOTE — DISCHARGE SUMMARY
[facility] - Orthopedic  Discharge Note  Short Stay    Procedure(s) (LRB):  DEBRIDEMENT, LOWER EXTREMITY (Left)  AMPUTATION, TOE (Left)    OUTCOME: Patient tolerated treatment/procedure well without complication and is now ready for discharge.    DISPOSITION: Home or Self Care    FINAL DIAGNOSIS:  Diabetic foot infection    FOLLOWUP: In clinic    DISCHARGE INSTRUCTIONS:    Discharge Procedure Orders   Diet Cardiac   Order Comments: Diabetic diet     Notify your health care provider if you experience any of the following:  temperature >100.4     Notify your health care provider if you experience any of the following:  redness, tenderness, or signs of infection (pain, swelling, redness, odor or green/yellow discharge around incision site)     Remove dressing in 24 hours   Order Comments: Clean incision daily with antibacterial soap/water pat dry redress with gauze     Shower on day dressing removed (No bath)         Clinical Reference Documents Added to Patient Instructions       Document    AMPUTATION OF THE FOOT OR TOE DISCHARGE INSTRUCTIONS (ENGLISH)          TIME SPENT ON DISCHARGE: 30 minutes

## 2022-03-29 NOTE — PLAN OF CARE
[facility] - Orthopedic  Initial Discharge Assessment       Primary Care Provider: Alex Babcock MD    Admission Diagnosis: Diabetic foot infection [E11.628, L08.9]  Pre-op evaluation [Z01.818]  Wound of foot [S91.309A]    Admission Date: 3/28/2022  Expected Discharge Date: 3/29/2022    Discharge Barriers Identified: None    Payor: MEDICARE / Plan: MEDICARE PART A & B / Product Type: Government /     Extended Emergency Contact Information  Primary Emergency Contact: Lauryn Galaviz  Mobile Phone: [phone]  Relation: Daughter  Preferred language: English   needed? No    Discharge Plan A: Home  Discharge Plan B: Home      [facility] [address]  [address]  [facility] [address]  Phone: [phone] Fax: [phone]    [facility] [address] [address]  Phone: [phone] Fax: [phone]      Initial Assessment (most recent)     Adult Discharge Assessment - 03/29/22 1028        Discharge Assessment    Assessment Type Discharge Planning Assessment     Source of Information family;patient     Lives With alone     Do you expect to return to your current living situation? Yes     Do you have help at home or someone to help you manage your care at home? Yes     Who are your caregiver(s) and their phone number(s)? Tierra Galaviz- Daughter [phone]     Current cognitive status: Alert/Oriented     Home Accessibility stairs to enter home     Number of Stairs, Main Entrance five     Stair Railings, Main Entrance railing on right side (ascending)     Home Layout Able to live on 1st floor     Equipment Currently Used at Home bedside commode;walker, rolling     Patient currently being followed by outpatient case management? No     Do you currently have service(s) that help you manage your care at home? No     Who is going to help you get home at discharge? Tierra Galaviz- Daughter [phone]     How do you  get to doctors appointments? car, drives self     Are you on dialysis? No     Do you take coumadin? No     Discharge Plan A Home     Discharge Plan B Home     DME Needed Upon Discharge  orthotic device   Offloading shoe    Discharge Plan discussed with: Adult children     Discharge Barriers Identified None               SW consulted for offloading shoe. MANUEL spoke with pt and daughter, Tierra. Pt lives at home alone, not current with hh and has rw and bsc. Choice obtained for [facility] for offloading shoe, referral sent. Per pts daughter, pt to dc home today. MANUEL called and spoke with Bert at [facility] and insurance does not cover offloading shoe which is $24.95 per Bert. MANUEL informed pt and daughter that insurance does not cover offloading shoe. Pt agrees to pay out of pocket and will need to [facility] to be [facility] for shoe. SW will cont to follow.

## 2022-03-29 NOTE — PLAN OF CARE
Problem: Adult Inpatient Plan of Care  Goal: Plan of Care Review  Outcome: Met  Goal: Patient-Specific Goal (Individualized)  Outcome: Met  Goal: Absence of Hospital-Acquired Illness or Injury  Outcome: Met  Goal: Optimal Comfort and Wellbeing  Outcome: Met  Goal: Readiness for Transition of Care  Outcome: Met     Problem: Diabetes Comorbidity  Goal: Blood Glucose Level Within Targeted Range  Outcome: Met     Problem: Fall Injury Risk  Goal: Absence of Fall and Fall-Related Injury  Outcome: Met

## 2022-03-29 NOTE — DISCHARGE SUMMARY
[facility] - Orthopedic  Discharge Note  Short Stay    Procedure(s) (LRB):  DEBRIDEMENT, LOWER EXTREMITY (Left)  AMPUTATION, TOE (Left)    OUTCOME: Patient tolerated treatment/procedure well without complication and is now ready for discharge.  Post op afebrile ambulating well with walker.  SS consulted for offloading post op shoe  DISPOSITION: Home or Self Care    FINAL DIAGNOSIS:  Diabetic foot infection    FOLLOWUP: In clinic 2 weeks    DISCHARGE INSTRUCTIONS:    Discharge Procedure Orders   Diet Cardiac   Order Comments: Diabetic diet     Notify your health care provider if you experience any of the following:  temperature >100.4     Notify your health care provider if you experience any of the following:  redness, tenderness, or signs of infection (pain, swelling, redness, odor or green/yellow discharge around incision site)     Remove dressing in 24 hours   Order Comments: Clean incision daily with antibacterial soap/water pat dry redress with gauze     Shower on day dressing removed (No bath)         Clinical Reference Documents Added to Patient Instructions       Document    AMPUTATION OF THE FOOT OR TOE DISCHARGE INSTRUCTIONS (ENGLISH)          TIME SPENT ON DISCHARGE: 20 minutes

## 2022-03-30 NOTE — PT/OT/SLP EVAL
Physical Therapy Evaluation    Patient Name:  Nicol Reese   MRN:  [id number]    Recommendations:     Discharge Recommendations:  home   Discharge Equipment Recommendations: other (see comments) (cast shoe)   Barriers to discharge: None    Assessment:     Nicol Reese is a 71 y.o. female admitted with a medical diagnosis of Diabetic foot infection.  She presents with the following impairments/functional limitations:  gait instability Patient with good ability to use walker heel down weightbearing Needs post op shoe for home.    Rehab Prognosis: Good; patient would benefit from acute skilled PT services to address these deficits and reach maximum level of function.    Recent Surgery: Procedure(s) (LRB):  DEBRIDEMENT, LOWER EXTREMITY (Left)  AMPUTATION, TOE (Left) 1 Day Post-Op    Plan:     During this hospitalization, patient to be seen 1 x/week to address the identified rehab impairments via canalith reposition procedure and progress toward the following goals:    · Plan of Care Expires:  03/29/22    Subjective     Chief Complaint: post op pain  Patient/Family Comments/goals: Patient has walker at home. Ready for dc home  Pain/Comfort:  · Pain Rating 1: 4/10  · Location - Side 1: Left  · Location 1: foot  · Pain Addressed 1: Cessation of Activity    Patients cultural, spiritual, Mandaen conflicts given the current situation: no    Living Environment:  Lives with family  Prior to admission, patients level of function was independent.  Equipment used at home: walker, rolling, walker, standard.  DME owned (not currently used): rolling walker.  Upon discharge, patient will have assistance from family.    Objective:     Communicated with nurse prior to session.  Patient found supine with    upon PT entry to room.    General Precautions: Standard, fall   Orthopedic Precautions:LLE partial weight bearing   Braces:    Respiratory Status: Room air    Exams:  · na    Functional Mobility:  · Gait: ambulated 30 feet with  walker heel down weight bearing    Therapeutic Activities and Exercises:   na      AM-PAC 6 CLICK MOBILITY  Total Score:24     Patient left supine with call button in reach.    GOALS:   Multidisciplinary Problems     Physical Therapy Goals     Not on file                History:     History reviewed. No pertinent past medical history.    Past Surgical History:   Procedure Laterality Date    DEBRIDEMENT OF LOWER EXTREMITY Left 3/28/2022    Procedure: DEBRIDEMENT, LOWER EXTREMITY;  Surgeon: Kobi Jacome DO;  Location: [facility] OR;  Service: General;  Laterality: Left;    TOE AMPUTATION Left 3/28/2022    Procedure: AMPUTATION, TOE;  Surgeon: Kobi Jacome DO;  Location: [facility] OR;  Service: General;  Laterality: Left;  possible left 2nd toe amp       Time Tracking:     PT Received On: 03/29/22  PT Start Time: 0815     PT Stop Time: 0830  PT Total Time (min): 15 min     Billable Minutes: Evaluation 15      03/29/2022

## 2022-03-31 LAB
ESTROGEN SERPL-MCNC: NORMAL PG/ML
INSULIN SERPL-ACNC: NORMAL U[IU]/ML
LAB AP GROSS DESCRIPTION: NORMAL
LAB AP LABORATORY NOTES: NORMAL
T3RU NFR SERPL: NORMAL %

## 2022-03-31 NOTE — ANESTHESIA POSTPROCEDURE EVALUATION
Anesthesia Post Evaluation    Patient: Nicol Reese    Procedure(s) Performed: Procedure(s) (LRB):  DEBRIDEMENT, LOWER EXTREMITY (Left)  AMPUTATION, TOE (Left)    Final Anesthesia Type: general      Patient location during evaluation: PACU  Patient participation: Yes- Able to Participate  Level of consciousness: awake and alert  Post-procedure vital signs: reviewed and stable  Pain management: adequate  Airway patency: patent  LORETO mitigation strategies: Multimodal analgesia  PONV status at discharge: No PONV  Anesthetic complications: no      Cardiovascular status: blood pressure returned to baseline  Respiratory status: unassisted  Hydration status: euvolemic  Follow-up not needed.          Vitals Value Taken Time   /79 03/29/22 1100   Temp 37 °C (98.6 °F) 03/29/22 1100   Pulse 80 03/29/22 1100   Resp 17 03/29/22 1100   SpO2 97 % 03/29/22 0817         Event Time   Out of Recovery 17:08:00         Pain/Aranza Score: No data recorded

## 2022-04-14 ENCOUNTER — OFFICE VISIT (OUTPATIENT)
Dept: SURGERY | Facility: CLINIC | Age: 72
End: 2022-04-14
Payer: MEDICARE

## 2022-04-14 DIAGNOSIS — Z09 POSTOP CHECK: Primary | ICD-10-CM

## 2022-04-14 PROCEDURE — 99024 POSTOP FOLLOW-UP VISIT: CPT | Mod: ,,, | Performed by: NURSE PRACTITIONER

## 2022-04-14 PROCEDURE — 99213 OFFICE O/P EST LOW 20 MIN: CPT | Mod: PBBFAC | Performed by: NURSE PRACTITIONER

## 2022-04-14 PROCEDURE — 99024 PR POST-OP FOLLOW-UP VISIT: ICD-10-PCS | Mod: ,,, | Performed by: NURSE PRACTITIONER

## 2022-04-14 NOTE — PROGRESS NOTES
Nicol Reese is a 71 y.o. female patient.   No diagnosis found.  Past Medical History:   Diagnosis Date    Diabetes mellitus, type 2      No past surgical history pertinent negatives on file.  Scheduled Meds:  Continuous Infusions:  PRN Meds:    Review of patient's allergies indicates:   Allergen Reactions    Augmentin [amoxicillin-pot clavulanate] Itching and Rash     There are no hospital problems to display for this patient.    There were no vitals taken for this visit.    Subjective completed augmentin, reports rash on last day of taking augmentin  Objectiveleft 2ne toe amp healing well sutures intact no drainage no cellulitis   Assessment & Plan     S/P LEFT 2nd toe amp  DC  sutures  augmentin alergy  Call or return if any problems  Marcie Ramos, CHRISTY  4/14/2022

## 2022-05-05 ENCOUNTER — HOSPITAL ENCOUNTER (OUTPATIENT)
Dept: RADIOLOGY | Facility: HOSPITAL | Age: 72
Discharge: HOME | End: 2022-05-05
Payer: MEDICARE

## 2022-05-05 DIAGNOSIS — Z12.31 VISIT FOR SCREENING MAMMOGRAM: ICD-10-CM

## 2022-05-05 PROCEDURE — 77067 SCR MAMMO BI INCL CAD: CPT | Mod: TC

## 2022-08-29 ENCOUNTER — HOSPITAL ENCOUNTER (EMERGENCY)
Facility: HOSPITAL | Age: 72
Discharge: HOME | End: 2022-08-29
Attending: EMERGENCY MEDICINE
Payer: MEDICARE

## 2022-08-29 VITALS
OXYGEN SATURATION: 99 % | BODY MASS INDEX: 29.7 KG/M2 | HEART RATE: 95 BPM | RESPIRATION RATE: 18 BRPM | WEIGHT: 184 LBS | TEMPERATURE: 98 F | SYSTOLIC BLOOD PRESSURE: 108 MMHG | DIASTOLIC BLOOD PRESSURE: 57 MMHG

## 2022-08-29 DIAGNOSIS — I87.2 VENOUS INSUFFICIENCY: ICD-10-CM

## 2022-08-29 DIAGNOSIS — R06.02 SHORTNESS OF BREATH: ICD-10-CM

## 2022-08-29 DIAGNOSIS — M71.9 BURSITIS, UNSPECIFIED SITE: Primary | ICD-10-CM

## 2022-08-29 LAB
ALBUMIN SERPL BCP-MCNC: 3.2 G/DL (ref 3.5–5)
ALBUMIN/GLOB SERPL: 0.7 {RATIO}
ALP SERPL-CCNC: 96 U/L (ref 55–142)
ALT SERPL W P-5'-P-CCNC: 26 U/L (ref 13–56)
ANION GAP SERPL CALCULATED.3IONS-SCNC: 17 MMOL/L (ref 7–16)
AST SERPL W P-5'-P-CCNC: 19 U/L (ref 15–37)
BASOPHILS # BLD AUTO: 0.02 K/UL (ref 0–0.2)
BASOPHILS NFR BLD AUTO: 0.2 % (ref 0–1)
BILIRUB SERPL-MCNC: 0.2 MG/DL (ref ?–1.2)
BUN SERPL-MCNC: 22 MG/DL (ref 7–18)
BUN/CREAT SERPL: 17 (ref 6–20)
CALCIUM SERPL-MCNC: 9.5 MG/DL (ref 8.5–10.1)
CHLORIDE SERPL-SCNC: 102 MMOL/L (ref 98–107)
CO2 SERPL-SCNC: 23 MMOL/L (ref 21–32)
CREAT SERPL-MCNC: 1.3 MG/DL (ref 0.55–1.02)
D DIMER PPP FEU-MCNC: 1.13 ΜG/ML (ref 0–0.47)
DIFFERENTIAL METHOD BLD: ABNORMAL
EGFR (NO RACE VARIABLE) (RUSH/TITUS): 43 ML/MIN/1.73M²
EOSINOPHIL # BLD AUTO: 0.02 K/UL (ref 0–0.5)
EOSINOPHIL NFR BLD AUTO: 0.2 % (ref 1–4)
ERYTHROCYTE [DISTWIDTH] IN BLOOD BY AUTOMATED COUNT: 14.4 % (ref 11.5–14.5)
GLOBULIN SER-MCNC: 4.7 G/DL (ref 2–4)
GLUCOSE SERPL-MCNC: 202 MG/DL (ref 74–106)
GLUCOSE SERPL-MCNC: 238 MG/DL (ref 70–105)
HCT VFR BLD AUTO: 33.4 % (ref 38–47)
HGB BLD-MCNC: 10.6 G/DL (ref 12–16)
IMM GRANULOCYTES # BLD AUTO: 0.15 K/UL (ref 0–0.04)
IMM GRANULOCYTES NFR BLD: 1.2 % (ref 0–0.4)
LYMPHOCYTES # BLD AUTO: 0.91 K/UL (ref 1–4.8)
LYMPHOCYTES NFR BLD AUTO: 7.4 % (ref 27–41)
MCH RBC QN AUTO: 27.5 PG (ref 27–31)
MCHC RBC AUTO-ENTMCNC: 31.7 G/DL (ref 32–36)
MCV RBC AUTO: 86.5 FL (ref 80–96)
MONOCYTES # BLD AUTO: 0.43 K/UL (ref 0–0.8)
MONOCYTES NFR BLD AUTO: 3.5 % (ref 2–6)
MPC BLD CALC-MCNC: 9.3 FL (ref 9.4–12.4)
NEUTROPHILS # BLD AUTO: 10.75 K/UL (ref 1.8–7.7)
NEUTROPHILS NFR BLD AUTO: 87.5 % (ref 53–65)
NRBC # BLD AUTO: 0 X10E3/UL
NRBC, AUTO (.00): 0 %
PLATELET # BLD AUTO: 374 K/UL (ref 150–400)
POTASSIUM SERPL-SCNC: 4.7 MMOL/L (ref 3.5–5.1)
PROT SERPL-MCNC: 7.9 G/DL (ref 6.4–8.2)
RBC # BLD AUTO: 3.86 M/UL (ref 4.2–5.4)
SODIUM SERPL-SCNC: 137 MMOL/L (ref 136–145)
WBC # BLD AUTO: 12.28 K/UL (ref 4.5–11)

## 2022-08-29 PROCEDURE — 85378 FIBRIN DEGRADE SEMIQUANT: CPT | Performed by: NURSE PRACTITIONER

## 2022-08-29 PROCEDURE — 63600175 PHARM REV CODE 636 W HCPCS: Performed by: NURSE PRACTITIONER

## 2022-08-29 PROCEDURE — 85025 COMPLETE CBC W/AUTO DIFF WBC: CPT | Performed by: EMERGENCY MEDICINE

## 2022-08-29 PROCEDURE — 96372 THER/PROPH/DIAG INJ SC/IM: CPT | Mod: 59

## 2022-08-29 PROCEDURE — 99284 EMERGENCY DEPT VISIT MOD MDM: CPT | Mod: ,,, | Performed by: EMERGENCY MEDICINE

## 2022-08-29 PROCEDURE — 99284 PR EMERGENCY DEPT VISIT,LEVEL IV: ICD-10-PCS | Mod: ,,, | Performed by: EMERGENCY MEDICINE

## 2022-08-29 PROCEDURE — 36415 COLL VENOUS BLD VENIPUNCTURE: CPT | Performed by: EMERGENCY MEDICINE

## 2022-08-29 PROCEDURE — 80053 COMPREHEN METABOLIC PANEL: CPT | Performed by: EMERGENCY MEDICINE

## 2022-08-29 PROCEDURE — 99285 EMERGENCY DEPT VISIT HI MDM: CPT | Mod: 25

## 2022-08-29 PROCEDURE — 82962 GLUCOSE BLOOD TEST: CPT

## 2022-08-29 PROCEDURE — 25500020 PHARM REV CODE 255: Performed by: EMERGENCY MEDICINE

## 2022-08-29 RX ORDER — SULFAMETHOXAZOLE AND TRIMETHOPRIM 800; 160 MG/1; MG/1
1 TABLET ORAL 2 TIMES DAILY
Qty: 10 TABLET | Refills: 0 | Status: SHIPPED | OUTPATIENT
Start: 2022-08-29 | End: 2022-09-03

## 2022-08-29 RX ORDER — SULFAMETHOXAZOLE AND TRIMETHOPRIM 200; 40 MG/5ML; MG/5ML
8 SUSPENSION ORAL EVERY 12 HOURS
COMMUNITY
End: 2022-08-29

## 2022-08-29 RX ORDER — KETOROLAC TROMETHAMINE 30 MG/ML
30 INJECTION, SOLUTION INTRAMUSCULAR; INTRAVENOUS
Status: DISCONTINUED | OUTPATIENT
Start: 2022-08-29 | End: 2022-08-29

## 2022-08-29 RX ORDER — KETOROLAC TROMETHAMINE 15 MG/ML
15 INJECTION, SOLUTION INTRAMUSCULAR; INTRAVENOUS
Status: COMPLETED | OUTPATIENT
Start: 2022-08-29 | End: 2022-08-29

## 2022-08-29 RX ADMIN — IOPAMIDOL 100 ML: 755 INJECTION, SOLUTION INTRAVENOUS at 04:08

## 2022-08-29 RX ADMIN — KETOROLAC TROMETHAMINE 15 MG: 15 INJECTION, SOLUTION INTRAMUSCULAR; INTRAVENOUS at 03:08

## 2022-08-29 NOTE — ED PROVIDER NOTES
Encounter Date: 8/29/2022       History     Chief Complaint   Patient presents with    Arm Pain     72 year old female presents to the emergency department to be evaluated for right arm pain. Denies any recent fall or injury. She noticed a small bump on her right elbow about a month ago. She began having pain and swelling to the area about a week ago. She was evaluated at [facility] and prescribed bactrim 4 days ago. She has been taking bactrim as directed and has had some improvement in her swelling, but she is concerned because she continues to have redness in her right arm.     The history is provided by the patient.   Arm Pain  This is a new problem. The current episode started more than 1 week ago. Pertinent negatives include no chest pain, no abdominal pain, no headaches and no shortness of breath.   Review of patient's allergies indicates:   Allergen Reactions    Augmentin [amoxicillin-pot clavulanate] Itching and Rash     Past Medical History:   Diagnosis Date    Diabetes mellitus, type 2      Past Surgical History:   Procedure Laterality Date    DEBRIDEMENT OF LOWER EXTREMITY Left 03/28/2022    Procedure: DEBRIDEMENT, LOWER EXTREMITY;  Surgeon: Kobi Jacome DO;  Location: [facility] OR;  Service: General;  Laterality: Left;    HYSTERECTOMY      TOE AMPUTATION Left 03/28/2022    Procedure: AMPUTATION, TOE;  Surgeon: Kobi Jacome DO;  Location: [facility] OR;  Service: General;  Laterality: Left;  possible left 2nd toe amp     Family History   Problem Relation Age of Onset    Breast cancer Sister      Social History     Tobacco Use    Smoking status: Never    Smokeless tobacco: Never   Substance Use Topics    Alcohol use: Not Currently    Drug use: Not Currently     Review of Systems   Constitutional:  Negative for chills and fever.   Respiratory:  Negative for shortness of breath.    Cardiovascular:  Negative for chest pain.   Gastrointestinal:  Negative for abdominal pain.   Neurological:  Negative  for headaches.   All other systems reviewed and are negative.    Physical Exam     Initial Vitals [08/29/22 1341]   BP Pulse Resp Temp SpO2   (!) 108/57 95 18 98 °F (36.7 °C) 99 %      MAP       --         Physical Exam    Vitals reviewed.  Constitutional: She appears well-developed and well-nourished.   Neck: Neck supple.   Cardiovascular:  Normal rate and regular rhythm.           Pulmonary/Chest: Breath sounds normal.   Abdominal: Abdomen is soft. Bowel sounds are normal. She exhibits no distension and no mass. There is no abdominal tenderness. There is no rebound and no guarding.   Musculoskeletal:         General: Normal range of motion.      Right upper arm: Normal.      Right elbow: Swelling present.      Right forearm: Normal.      Cervical back: Neck supple.     Neurological: She is alert and oriented to person, place, and time. She has normal strength. GCS score is 15. GCS eye subscore is 4. GCS verbal subscore is 5. GCS motor subscore is 6.   Skin: Skin is warm and dry. Capillary refill takes less than 2 seconds.   Redness to right elbow with small amount of serous drainage   Psychiatric: She has a normal mood and affect.       Medical Screening Exam   See Full Note    ED Course   Procedures  Labs Reviewed   D DIMER, QUANTITATIVE - Abnormal; Notable for the following components:       Result Value    D-Dimer 1.13 (*)     All other components within normal limits   COMPREHENSIVE METABOLIC PANEL - Abnormal; Notable for the following components:    Anion Gap 17 (*)     Glucose 202 (*)     BUN 22 (*)     Creatinine 1.30 (*)     Albumin 3.2 (*)     Globulin 4.7 (*)     eGFR 43 (*)     All other components within normal limits   CBC WITH DIFFERENTIAL - Abnormal; Notable for the following components:    WBC 12.28 (*)     RBC 3.86 (*)     Hemoglobin 10.6 (*)     Hematocrit 33.4 (*)     MCHC 31.7 (*)     MPV 9.3 (*)     Neutrophils % 87.5 (*)     Lymphocytes % 7.4 (*)     Eosinophils % 0.2 (*)     Immature  Granulocytes % 1.2 (*)     Neutrophils, Abs 10.75 (*)     Lymphocytes, Absolute 0.91 (*)     Immature Granulocytes, Absolute 0.15 (*)     All other components within normal limits   POCT GLUCOSE MONITORING CONTINUOUS - Abnormal; Notable for the following components:    POC Glucose 238 (*)     All other components within normal limits   CBC W/ AUTO DIFFERENTIAL    Narrative:     The following orders were created for panel order CBC auto differential.  Procedure                               Abnormality         Status                     ---------                               -----------         ------                     CBC with Differential[[id number]]        Abnormal            Final result                 Please view results for these tests on the individual orders.   POCT GLUCOSE MONITORING CONTINUOUS          Imaging Results              CTA Chest Non-Coronary (PE Study) (Final result)  Result time 08/29/22 16:36:22      Final result by Stew Manzano II, MD (08/29/22 16:36:22)                   Impression:      No evidence of pulmonary thromboembolism or other acute process demonstrated.      Electronically signed by: Stew Manzano  Date:    08/29/2022  Time:    16:36               Narrative:    EXAMINATION:  CTA CHEST NON CORONARY    CLINICAL HISTORY:  shortness of breath;    TECHNIQUE:  Axial CT imaging of the chest is performed with intravenous contrast. Contrast dose is 100 cc Isovue 370.    CT dose reduction technique used - Dose Rite and tube current modulation.    COMPARISON:  None available    FINDINGS:  No thrombus or other abnormality is identified in the pulmonary arteries or veins.  The pulmonary vessel caliber is within normal limits.  The heart, mediastinum and great vessels appear within normal limits.    Lung parenchyma shows no evidence of airspace disease or abnormal density.  No effusion or pneumothorax is present.                                       US Lower Extremity Veins Bilateral  (Final result)  Result time 08/29/22 15:52:09      Final result by Stew Manzano II, MD (08/29/22 15:52:09)                   Impression:      No evidence of deep venous thrombosis.    Ultrasound images stored and captured.      Electronically signed by: Stew Manzano  Date:    08/29/2022  Time:    15:52               Narrative:    EXAMINATION:  US LOWER EXTREMITY VEINS BILATERAL    CLINICAL HISTORY:  right leg swelling;    TECHNIQUE:  Duplex and color flow Doppler and dynamic compression was performed of the lower extremity veins was performed.    COMPARISON:  None.    FINDINGS:  No evidence of echogenic, non-compressible thrombus seen in the visualized veins of the extremities.  Color Doppler venous waveform pattern is within normal limits.                                       X-Ray Chest PA And Lateral (Final result)  Result time 08/29/22 14:57:03      Final result by Stew Manzano II, MD (08/29/22 14:57:03)                   Impression:      No evidence of acute cardiopulmonary disease.      Electronically signed by: Stew Manzano  Date:    08/29/2022  Time:    14:57               Narrative:    EXAMINATION:  XR CHEST PA AND LATERAL    CLINICAL HISTORY:  Shortness of breath    COMPARISON:  20 March 2022    FINDINGS:  The heart and mediastinum are normal in size and configuration.  The pulmonary vascularity is normal in caliber.  No lung infiltrates, effusions, pneumothorax or other abnormality is demonstrated.                                       Medications   ketorolac injection 15 mg (15 mg Intramuscular Given 8/29/22 1515)   iopamidoL (ISOVUE-370) injection 100 mL (100 mLs Intravenous Given 8/29/22 1632)                Attending Attestation:     Physician Attestation Statement for NP/PA:   I have conducted a face to face encounter with this patient in addition to the NP/PA, due to NP/PA Request    Other NP/PA Attestation Additions:      Medical Decision Making: Patient evaluated by emergency  physician and by a nurse practitioner.  Patient presented today because of some redness and swelling to the right olecranon bursa area.  Her swelling has improved over the past several days.  She has been on Bactrim for the past few days which was prescribed by her doctor for suspected cellulitis of the lower extremity.  The swelling of the elbow has been present for the past week a proximally and says that he came after she has been rubbing her right elbow on the bed creating a friction action.  Also she has had some swelling to her lower extremities some mild shortness of breath over the past few weeks.  No associated chest pain.  No associated fever coughing.  Physical exam shows tapered was awake alert nontoxic appearing.  Heart lung exams are unremarkable.  Abdomen soft and nontender.  Right elbow had some moderate erythema proximally 3 cm in diameter with excoriation with no fluctuance.  Mild none pitting edema bilateral lower extremities with right leg slightly some more swollen than the right.  Bilateral ultrasounds of lower extremity showed no evidence of DVT and CT PE protocol was done due to leg swelling shortness of breath but showed no evidence of PE.  Patient will be continuing Bactrim.  Another 5 days for total of 10 day regimen was written.  Also she will use warm heat.  Advised her to follow-up with primary care doctor in 1 week and return the ER if symptoms worsen or new symptoms develop                 Clinical Impression:   Final diagnoses:  [M71.9] Bursitis, unspecified site (Primary)  [R06.02] Shortness of breath  [I87.2] Venous insufficiency      ED Disposition Condition    Discharge Stable          ED Prescriptions       Medication Sig Dispense Start Date End Date Auth. Provider    sulfamethoxazole-trimethoprim 800-160mg (BACTRIM DS) 800-160 mg Tab Take 1 tablet by mouth 2 (two) times daily. for 5 days 10 tablet 8/29/2022 9/3/2022 AL Miller          Follow-up Information     None          Daksha Barker, [place]  08/29/22 1430       Mich Carroll MD  08/29/22 [id number]

## 2022-08-29 NOTE — ED TRIAGE NOTES
Patient presents with right elbow pain and swelling. Seen at [facility] on Thursday, given IM clindamycin shot. Sent home with bactrim.

## 2022-08-29 NOTE — DISCHARGE INSTRUCTIONS
Continue antibiotics as directed. Warm compress as discussed. Follow up with your primary care provider in 1 week.  Return to the emergency department for any increase in symptoms or for any other new or worrisome symptoms.     Your prescription today will be for Bactrim which with the same is your doctor prescribed you to take for 5 days.  You will need to take Bactrim for a total of 10 days    Avoid friction of the right elbow as we discussed.    Elevate her legs more often.  Use compressive stockings as we discussed.

## 2022-08-29 NOTE — PROVIDER PROGRESS NOTES - EMERGENCY DEPT.
Encounter Date: 8/29/2022    ED Physician Progress Notes         at bedside, evaluated the patient. Patient complains of some mild shortness of breath and swelling of her right lower extremity. Will add d-dimer, chest xray and bilateral venous dopplers

## 2022-10-17 ENCOUNTER — OFFICE VISIT (OUTPATIENT)
Dept: DIABETES SERVICES | Facility: CLINIC | Age: 72
End: 2022-10-17
Payer: MEDICARE

## 2022-10-17 VITALS
SYSTOLIC BLOOD PRESSURE: 120 MMHG | RESPIRATION RATE: 16 BRPM | HEART RATE: 96 BPM | BODY MASS INDEX: 28.61 KG/M2 | DIASTOLIC BLOOD PRESSURE: 58 MMHG | OXYGEN SATURATION: 97 % | HEIGHT: 66 IN | WEIGHT: 178 LBS

## 2022-10-17 DIAGNOSIS — E78.5 HYPERLIPIDEMIA, UNSPECIFIED HYPERLIPIDEMIA TYPE: ICD-10-CM

## 2022-10-17 DIAGNOSIS — M06.9 RHEUMATOID ARTHRITIS, INVOLVING UNSPECIFIED SITE, UNSPECIFIED WHETHER RHEUMATOID FACTOR PRESENT: ICD-10-CM

## 2022-10-17 DIAGNOSIS — M21.611 BUNION, RIGHT FOOT: ICD-10-CM

## 2022-10-17 DIAGNOSIS — Z79.4 TYPE 2 DIABETES MELLITUS WITH HYPERGLYCEMIA, WITH LONG-TERM CURRENT USE OF INSULIN: Primary | ICD-10-CM

## 2022-10-17 DIAGNOSIS — E11.65 TYPE 2 DIABETES MELLITUS WITH HYPERGLYCEMIA, WITH LONG-TERM CURRENT USE OF INSULIN: Primary | ICD-10-CM

## 2022-10-17 DIAGNOSIS — L97.511 FOOT ULCERATION, RIGHT, LIMITED TO BREAKDOWN OF SKIN: ICD-10-CM

## 2022-10-17 DIAGNOSIS — G62.9 NEUROPATHY: ICD-10-CM

## 2022-10-17 LAB
GLUCOSE SERPL-MCNC: 181 MG/DL (ref 70–110)
HBA1C MFR BLD: 10 % (ref 4.5–6.6)

## 2022-10-17 PROCEDURE — 82962 GLUCOSE BLOOD TEST: CPT | Mod: PBBFAC | Performed by: NURSE PRACTITIONER

## 2022-10-17 PROCEDURE — 99214 PR OFFICE/OUTPT VISIT, EST, LEVL IV, 30-39 MIN: ICD-10-PCS | Mod: S$PBB,,, | Performed by: NURSE PRACTITIONER

## 2022-10-17 PROCEDURE — 83036 HEMOGLOBIN GLYCOSYLATED A1C: CPT | Mod: PBBFAC | Performed by: NURSE PRACTITIONER

## 2022-10-17 PROCEDURE — 99214 OFFICE O/P EST MOD 30 MIN: CPT | Mod: S$PBB,,, | Performed by: NURSE PRACTITIONER

## 2022-10-17 PROCEDURE — 99214 OFFICE O/P EST MOD 30 MIN: CPT | Mod: PBBFAC | Performed by: NURSE PRACTITIONER

## 2022-10-17 RX ORDER — EZETIMIBE 10 MG/1
TABLET ORAL
COMMUNITY
Start: 2022-06-22 | End: 2023-05-09 | Stop reason: SDUPTHER

## 2022-10-17 RX ORDER — CALCIUM CARBONATE 600 MG
600 TABLET ORAL DAILY
COMMUNITY

## 2022-10-17 RX ORDER — LACTULOSE 10 G/15ML
SOLUTION ORAL; RECTAL
COMMUNITY
Start: 2022-01-10 | End: 2023-01-10

## 2022-10-17 RX ORDER — MELOXICAM 7.5 MG/1
TABLET ORAL
COMMUNITY
Start: 2022-09-08

## 2022-10-17 RX ORDER — CIPROFLOXACIN 500 MG/1
TABLET ORAL
COMMUNITY
Start: 2022-10-05

## 2022-10-17 RX ORDER — DEXAMETHASONE 4 MG/1
TABLET ORAL
COMMUNITY
Start: 2022-10-06

## 2022-10-17 RX ORDER — MUPIROCIN 20 MG/G
OINTMENT TOPICAL 3 TIMES DAILY
Qty: 45 G | Refills: 0 | Status: SHIPPED | OUTPATIENT
Start: 2022-10-17

## 2022-10-17 RX ORDER — METHOCARBAMOL 500 MG/1
TABLET, FILM COATED ORAL
COMMUNITY
Start: 2022-09-27

## 2022-10-17 RX ORDER — INSULIN GLARGINE 100 [IU]/ML
INJECTION, SOLUTION SUBCUTANEOUS
Qty: 30 ML | Refills: 1 | Status: SHIPPED | OUTPATIENT
Start: 2022-10-17 | End: 2023-05-09

## 2022-10-17 RX ORDER — CYANOCOBALAMIN (VITAMIN B-12) 500 MCG
400 TABLET ORAL DAILY
COMMUNITY

## 2022-10-17 NOTE — PROGRESS NOTES
Subjective:       Patient ID: Nicol Reese is a 72 y.o. female.    Chief Complaint: Initial Diabetes Care Management Assessment (Pt here to establish care, diagnosed in the '80s, checks sugar bid. )    Here today to establish for Type 2 DM care, diagnosed over 35 years ago.    Not on ACE/ARB or statin.  Taking zetia because she was intolerant of statins.    DM regimen: lantus, metformin.  Has not been on SGLT-2 or GLP-1 in the past.    Hemoglobin A1C       Date                     Value               Ref Range           Status                10/17/2022               10.0 (A)            4.5 - 6.6 %         Final              We will obtain ophthalmology and pcp last notes and any pertinent labs and have the pt return if labs are needed fasting.     No results found for: MICROALBUR, [id number]  No results found for: CHOL  No results found for: HDL  No results found for: LDLCALC  No results found for: TRIG  No results found for: CHOLHDL  CMP  Sodium       Date                     Value               Ref Range           Status                08/29/2022               137                 136 - 145 mmol*     Final            ----------  Potassium       Date                     Value               Ref Range           Status                08/29/2022               4.7                 3.5 - 5.1 mmol*     Final            ----------  Chloride       Date                     Value               Ref Range           Status                08/29/2022               102                 98 - 107 mmol/L     Final            ----------  CO2       Date                     Value               Ref Range           Status                08/29/2022               23                  21 - 32 mmol/L      Final            ----------  Glucose       Date                     Value               Ref Range           Status                08/29/2022               202 (H)             74 - 106 mg/dL      Final            ----------  BUN       Date                      Value               Ref Range           Status                08/29/2022               22 (H)              7 - 18 mg/dL        Final            ----------  Creatinine       Date                     Value               Ref Range           Status                08/29/2022               1.30 (H)            0.55 - 1.02 mg*     Final            ----------  Calcium       Date                     Value               Ref Range           Status                08/29/2022               9.5                 8.5 - 10.1 mg/*     Final            ----------  Total Protein       Date                     Value               Ref Range           Status                08/29/2022               7.9                 6.4 - 8.2 g/dL      Final            ----------  Albumin       Date                     Value               Ref Range           Status                08/29/2022               3.2 (L)             3.5 - 5.0 g/dL      Final            ----------  Bilirubin, Total       Date                     Value               Ref Range           Status                08/29/2022               0.2                 >0.0 - 1.2 mg/*     Final            ----------  Alk Phos       Date                     Value               Ref Range           Status                08/29/2022               96                  55 - 142 U/L        Final            ----------  AST       Date                     Value               Ref Range           Status                08/29/2022               19                  15 - 37 U/L         Final            ----------  ALT       Date                     Value               Ref Range           Status                08/29/2022               26                  13 - 56 U/L         Final            ----------  Anion Gap       Date                     Value               Ref Range           Status                08/29/2022               17 (H)              7 - 16 mmol/L       Final            ----------  eGFR        Date                     Value               Ref Range           Status                03/29/2022               75                  >=60 mL/min/1.*     Final            ----------    Review of Systems   Constitutional:  Positive for activity change and fatigue. Negative for appetite change and diaphoresis.   HENT:  Negative for nasal congestion, facial swelling and sinus pressure/congestion.    Eyes:  Negative for visual disturbance.   Respiratory:  Negative for shortness of breath and wheezing.    Cardiovascular:  Negative for chest pain and leg swelling.   Gastrointestinal:  Negative for constipation, diarrhea, nausea and vomiting.   Endocrine: Negative for polydipsia, polyphagia and polyuria.   Genitourinary:  Negative for dysuria, frequency and urgency.   Musculoskeletal:  Positive for arthralgias and gait problem. Negative for myalgias.   Integumentary:  Positive for wound. Negative for color change and rash.   Neurological:  Negative for dizziness, syncope, weakness, headaches, coordination difficulties and coordination difficulties.   Hematological:  Does not bruise/bleed easily.   Psychiatric/Behavioral:  Negative for self-injury, sleep disturbance and suicidal ideas. The patient is not nervous/anxious.        Objective:      Physical Exam  Vitals and nursing note reviewed.   Constitutional:       Appearance: Normal appearance.   HENT:      Head: Normocephalic.   Neck:      Thyroid: No thyromegaly.      Vascular: No carotid bruit.   Cardiovascular:      Rate and Rhythm: Normal rate and regular rhythm.      Pulses:           Dorsalis pedis pulses are 2+ on the right side and 2+ on the left side.        Posterior tibial pulses are 2+ on the right side and 2+ on the left side.      Heart sounds: Normal heart sounds.   Pulmonary:      Effort: Pulmonary effort is normal.      Breath sounds: Normal breath sounds.   Musculoskeletal:         General: Normal range of motion.      Right lower leg: Edema present.       Left lower leg: Edema present.      Right foot: Normal range of motion. Bunion present.      Left foot: Normal range of motion. Bunion present.      Right Lower Extremity: Right leg is amputated below ankle.      Left Lower Extremity: Left leg is amputated below ankle.   Feet:      Right foot:      Protective Sensation: 6 sites tested.  3 sites sensed.      Skin integrity: Ulcer and skin breakdown present.      Toenail Condition: Right toenails are abnormally thick.      Left foot:      Protective Sensation: 6 sites tested.  4 sites sensed.      Skin integrity: Skin integrity normal.      Toenail Condition: Left toenails are abnormally thick.      Comments: 1 cm circular wound to right lateral aspect over bunion, full thickness.     Bilateral second digits previously amputated due to pressure ulceration from great toe pressing on them secondary to malformation/bunion of feet. She needs to be in diabetic shoes made with her malformation taken into consideration to offload the bunion area to prevent further amputations.  Skin:     General: Skin is warm and dry.      Comments: Skin color changes noted to bilateral lower legs.    Neurological:      General: No focal deficit present.      Mental Status: She is alert and oriented to person, place, and time.   Psychiatric:         Mood and Affect: Mood normal.         Behavior: Behavior normal.         Thought Content: Thought content normal.         Judgment: Judgment normal.       Assessment:       Problem List Items Addressed This Visit          Neuro    Neuropathy       Cardiac/Vascular    Hyperlipidemia       Endocrine    Diabetes mellitus, type 2 - Primary    Relevant Orders    Hemoglobin A1C, POCT (Completed)    POCT Glucose, Hand-Held Device (Completed)         Plan:       Education provided and discussed at length  Use mupirocin twice daily for wound on right bunion area.  We will refer for diabetic shoes  Use compression bilaterally to lower legs to prevent edema  and keep legs elevated when sitting.  Cut right sock at mid foot area so as not to apply pressure to ulcerated area.  Start jardiance as directed.  Decrease lantus back to 16 units and titrate as directed to keep ams less than 130.  Continue metformin for now. Will hold off on starting ACE/ARB as she is slightly hypotensive today and we are starting Jardiance.  Encouraged good water intake daily

## 2022-10-17 NOTE — PATIENT INSTRUCTIONS
Use mupirocin twice daily for wound on right bunion area.  We will refer for diabetic shoes  Use compression bilaterally to lower legs to prevent edema and keep legs elevated when sitting.  Cut right sock at mid foot area so as not to apply pressure to ulcerated area.  Start jardiance as directed.  Decrease lantus back to 16 units and titrate as directed to keep ams less than 130.  Continue metformin for now.

## 2023-05-01 NOTE — PROGRESS NOTES
Subjective     Patient ID: Nicol Reese is a 72 y.o. female.    Chief Complaint: No chief complaint on file.    Hemoglobin A1C       Date                     Value               Ref Range           Status                10/17/2022               10.0 (A)            4.5 - 6.6 %         Final            ----------  .lastmicro  Review of Systems       Objective     Physical Exam       Assessment and Plan     Problem List Items Addressed This Visit    None      ***

## 2023-05-01 NOTE — PROGRESS NOTES
October 19, 2017    Patient: Mercedes Calle   Date of Visit: 10/19/2017       To Whom It May Concern:    Mercedes Calle was seen and treated in our emergency department on 10/19/2017. She should not participate in gym/sports until one week 10/26/17.     If Subjective     Patient ID: Nicol Reese is a 72 y.o. female.    Chief Complaint: No chief complaint on file.    Here today for routine evaluation and med refill.  Her a1c is up from last check. Needs fasting labs but is not fasting today.  Lab Results       Component                Value               Date                       HGBA1C                   11.4 (A)            05/09/2023              Has not contraindications to GLP-1 therapy.    Hemoglobin A1C       Date                     Value               Ref Range           Status                10/17/2022               10.0 (A)            4.5 - 6.6 %         Final            ----------  No results found for: MICROALBUR, [id number]  No results found for: CHOL  No results found for: HDL  No results found for: LDLCALC  No results found for: DLDL  No results found for: TRIG    f1 No results found for: CHOLHDL  CMP  Sodium       Date                     Value               Ref Range           Status                08/29/2022               137                 136 - 145 mmol*     Final            ----------  Potassium       Date                     Value               Ref Range           Status                08/29/2022               4.7                 3.5 - 5.1 mmol*     Final            ----------  Chloride       Date                     Value               Ref Range           Status                08/29/2022               102                 98 - 107 mmol/L     Final            ----------  CO2       Date                     Value               Ref Range           Status                08/29/2022               23                  21 - 32 mmol/L      Final            ----------  Glucose       Date                     Value               Ref Range           Status                08/29/2022               202 (H)             74 - 106 mg/dL      Final            ----------  BUN       Date                     Value               Ref Range           Status                 08/29/2022               22 (H)              7 - 18 mg/dL        Final            ----------  Creatinine       Date                     Value               Ref Range           Status                08/29/2022               1.30 (H)            0.55 - 1.02 mg*     Final            ----------  Calcium       Date                     Value               Ref Range           Status                08/29/2022               9.5                 8.5 - 10.1 mg/*     Final            ----------  Total Protein       Date                     Value               Ref Range           Status                08/29/2022               7.9                 6.4 - 8.2 g/dL      Final            ----------  Albumin       Date                     Value               Ref Range           Status                08/29/2022               3.2 (L)             3.5 - 5.0 g/dL      Final            ----------  Bilirubin, Total       Date                     Value               Ref Range           Status                08/29/2022               0.2                 >0.0 - 1.2 mg/*     Final            ----------  Alk Phos       Date                     Value               Ref Range           Status                08/29/2022               96                  55 - 142 U/L        Final            ----------  AST       Date                     Value               Ref Range           Status                08/29/2022               19                  15 - 37 U/L         Final            ----------  ALT       Date                     Value               Ref Range           Status                08/29/2022               26                  13 - 56 U/L         Final            ----------  Anion Gap       Date                     Value               Ref Range           Status                08/29/2022               17 (H)              7 - 16 mmol/L       Final            ----------  eGFR       Date                     Value               Ref Range           Status                 08/29/2022               43 (L)              >=60 mL/min/1.*     Final            ----------    Review of Systems   Constitutional:  Positive for activity change and fatigue. Negative for appetite change and diaphoresis.   HENT:  Negative for nasal congestion, facial swelling and sinus pressure/congestion.    Eyes:  Negative for visual disturbance.   Respiratory:  Negative for shortness of breath and wheezing.    Cardiovascular:  Negative for chest pain and leg swelling.   Gastrointestinal:  Negative for constipation, diarrhea, nausea and vomiting.   Endocrine: Negative for polydipsia, polyphagia and polyuria.   Genitourinary:  Negative for dysuria, frequency and urgency.   Musculoskeletal:  Positive for arthralgias and gait problem. Negative for myalgias.   Integumentary:  Positive for wound (released from wound care today for right foot wound). Negative for color change and rash.   Neurological:  Negative for dizziness, syncope, weakness, headaches, coordination difficulties and coordination difficulties.   Hematological:  Does not bruise/bleed easily.   Psychiatric/Behavioral:  Negative for self-injury, sleep disturbance and suicidal ideas. The patient is not nervous/anxious.         Objective     Physical Exam  Vitals and nursing note reviewed.   Constitutional:       Appearance: Normal appearance.   HENT:      Head: Normocephalic.   Neck:      Thyroid: No thyromegaly.      Vascular: No carotid bruit.   Cardiovascular:      Rate and Rhythm: Normal rate and regular rhythm.      Heart sounds: Normal heart sounds.   Pulmonary:      Effort: Pulmonary effort is normal.      Breath sounds: Normal breath sounds.   Musculoskeletal:         General: Normal range of motion.      Right lower leg: Edema present.      Left lower leg: Edema present.      Right Lower Extremity: Right leg is amputated below ankle.      Left Lower Extremity: Left leg is amputated below ankle.   Feet:      Comments:     Bilateral second  digits previously amputated due to pressure ulceration from great toe pressing on them secondary to malformation/bunion of feet. She needs to be in diabetic shoes made with her malformation taken into consideration to offload the bunion area to prevent further amputations.  Skin:     General: Skin is warm and dry.      Comments: Skin color changes noted to bilateral lower legs.    Neurological:      General: No focal deficit present.      Mental Status: She is alert and oriented to person, place, and time.   Psychiatric:         Mood and Affect: Mood normal.         Behavior: Behavior normal.         Thought Content: Thought content normal.         Judgment: Judgment normal.          Assessment and Plan     1. Type 2 diabetes mellitus with hyperglycemia, with long-term current use of insulin  Continue present meds, including basal insulin at 30 units daily as she reports am readings of 80-120s.  Lifestyle modifications encouraged  Start trulicity 1.5mg once weekly.  Denies contraindications as discussed.     -     Hemoglobin A1C, POCT  -     POCT Glucose, Hand-Held Device  -     Comprehensive Metabolic Panel; Future; Expected date: 05/09/2023  -     Lipid Panel; Future; Expected date: 05/09/2023  -     Microalbumin/Creatinine Ratio, Urine; Future; Expected date: 05/09/2023  -     empagliflozin (JARDIANCE) 25 mg tablet; Take 1 tablet (25 mg total) by mouth once daily.  Dispense: 90 tablet; Refill: 1    2. Hyperlipidemia, unspecified hyperlipidemia type  Statin intolerant, on zetia    3. Rheumatoid arthritis, involving unspecified site, unspecified whether rheumatoid factor present      4. Neuropathy  Continue gabapentin    Other orders  -     dulaglutide (TRULICITY) 1.5 mg/0.5 mL pen injector; Inject 1.5 mg into the skin every 7 days.  Dispense: 12 pen; Refill: 1  -     insulin (LANTUS SOLOSTAR U-100 INSULIN) glargine 100 units/mL SubQ pen; Inject 30 units sq once daily  Dispense: 30 mL; Refill: 1  -     ezetimibe  (ZETIA) 10 mg tablet; Take 1 tablet (10 mg total) by mouth every evening. Take one tablet by mouth daily  Dispense: 90 tablet; Refill: 1  -     gabapentin (NEURONTIN) 600 MG tablet; Take 1 tablet (600 mg total) by mouth 3 (three) times daily.  Dispense: 270 tablet; Refill: 1

## 2023-05-09 ENCOUNTER — OFFICE VISIT (OUTPATIENT)
Dept: DIABETES SERVICES | Facility: CLINIC | Age: 73
End: 2023-05-09
Payer: MEDICARE

## 2023-05-09 VITALS
OXYGEN SATURATION: 98 % | WEIGHT: 164 LBS | SYSTOLIC BLOOD PRESSURE: 118 MMHG | HEIGHT: 66 IN | DIASTOLIC BLOOD PRESSURE: 62 MMHG | HEART RATE: 89 BPM | BODY MASS INDEX: 26.36 KG/M2 | RESPIRATION RATE: 18 BRPM

## 2023-05-09 DIAGNOSIS — G62.9 NEUROPATHY: ICD-10-CM

## 2023-05-09 DIAGNOSIS — Z79.4 TYPE 2 DIABETES MELLITUS WITH HYPERGLYCEMIA, WITH LONG-TERM CURRENT USE OF INSULIN: Primary | ICD-10-CM

## 2023-05-09 DIAGNOSIS — M06.9 RHEUMATOID ARTHRITIS, INVOLVING UNSPECIFIED SITE, UNSPECIFIED WHETHER RHEUMATOID FACTOR PRESENT: ICD-10-CM

## 2023-05-09 DIAGNOSIS — E11.65 TYPE 2 DIABETES MELLITUS WITH HYPERGLYCEMIA, WITH LONG-TERM CURRENT USE OF INSULIN: Primary | ICD-10-CM

## 2023-05-09 DIAGNOSIS — E78.5 HYPERLIPIDEMIA, UNSPECIFIED HYPERLIPIDEMIA TYPE: ICD-10-CM

## 2023-05-09 LAB
GLUCOSE SERPL-MCNC: 337 MG/DL (ref 70–110)
HBA1C MFR BLD: 11.4 % (ref 4.5–6.6)

## 2023-05-09 PROCEDURE — 83036 HEMOGLOBIN GLYCOSYLATED A1C: CPT | Mod: PBBFAC | Performed by: NURSE PRACTITIONER

## 2023-05-09 PROCEDURE — 99214 PR OFFICE/OUTPT VISIT, EST, LEVL IV, 30-39 MIN: ICD-10-PCS | Mod: S$PBB,,, | Performed by: NURSE PRACTITIONER

## 2023-05-09 PROCEDURE — 99214 OFFICE O/P EST MOD 30 MIN: CPT | Mod: S$PBB,,, | Performed by: NURSE PRACTITIONER

## 2023-05-09 PROCEDURE — 82962 GLUCOSE BLOOD TEST: CPT | Mod: PBBFAC | Performed by: NURSE PRACTITIONER

## 2023-05-09 PROCEDURE — 99214 OFFICE O/P EST MOD 30 MIN: CPT | Mod: PBBFAC | Performed by: NURSE PRACTITIONER

## 2023-05-09 RX ORDER — DULAGLUTIDE 1.5 MG/.5ML
1.5 INJECTION, SOLUTION SUBCUTANEOUS
Qty: 12 PEN | Refills: 1 | Status: SHIPPED | OUTPATIENT
Start: 2023-05-09 | End: 2023-05-24 | Stop reason: SDUPTHER

## 2023-05-09 RX ORDER — DULAGLUTIDE 1.5 MG/.5ML
1.5 INJECTION, SOLUTION SUBCUTANEOUS
Qty: 12 PEN | Refills: 1 | Status: SHIPPED | OUTPATIENT
Start: 2023-05-09 | End: 2023-05-09

## 2023-05-09 RX ORDER — GABAPENTIN 600 MG/1
600 TABLET ORAL 3 TIMES DAILY
Qty: 270 TABLET | Refills: 1 | Status: SHIPPED | OUTPATIENT
Start: 2023-05-09 | End: 2023-12-06 | Stop reason: SDUPTHER

## 2023-05-09 RX ORDER — INSULIN GLARGINE 100 [IU]/ML
INJECTION, SOLUTION SUBCUTANEOUS
Qty: 30 ML | Refills: 1 | Status: SHIPPED | OUTPATIENT
Start: 2023-05-09 | End: 2023-12-06 | Stop reason: SDUPTHER

## 2023-05-09 RX ORDER — EZETIMIBE 10 MG/1
10 TABLET ORAL NIGHTLY
Qty: 90 TABLET | Refills: 1 | Status: SHIPPED | OUTPATIENT
Start: 2023-05-09 | End: 2023-12-06 | Stop reason: SDUPTHER

## 2023-05-09 RX ORDER — INSULIN GLARGINE 100 [IU]/ML
INJECTION, SOLUTION SUBCUTANEOUS
Qty: 30 ML | Refills: 1 | Status: SHIPPED | OUTPATIENT
Start: 2023-05-09 | End: 2023-05-09

## 2023-05-09 NOTE — PATIENT INSTRUCTIONS
Start trulicity 1.5 mg once weekly.    Continue insulin at 30 unit daily      Pt is advised to monitor and document glucose fasting when you wake up before you eat and 2 hours after meal and bring in meter to next visit.      Ensure to take medications as directed.      Follow diabetic diet as directed.      Work to achieve normal body weight.     Ensure to exercise 4-5 times per week for 20 minutes.  Low Carbohydrate snacks/quick meals    Ham and cheese rollups - slice of ham wrapped around a string cheese/cheese slice. (0 gm carb)  Cucumber boats (stuffed with chicken salad or tuna salad - no fruit or sweet pickle in salad) (0 gm carb)  Celery and Peanut Butter (2 stalks with 1 Tbsp PNB = 6 gm carb)  Nuts - mixed (1/4 cup = 6 gm carb)  Sunflower seeds- without shell (1/4 cup = 7 gm carb) In the shell (1 cup = 3.3 gm carb)  Deviled eggs (no sweet pickles) - (0 gm carb)  Hard boiled eggs - (0 gm carb)  Guacamole with raw vegetables (mashed avocado with lime juice and seasoning to taste) (1 cup raw veg = 5 gm carb)  Ranch dressing with raw vegetables -(2 Tbsp Ranch = 2 gm carb, ½ cup raw veggies = 2.5 gm carb  Lasagna rolls- Slice zucchini thinly lengthwise and microwave for 1-2 minutes. Fill with ricotta, parmesan cheese. Roll and top with low carb tomato sauce. (5 rolls = 5 gm carb)  Crust less pizza -pepperoni or Screven daniel topped with cheese and veggies +1 tbsp tomato sauce, microwave (1 gm carb)  Beef jerky - read label for lower carb jerky  Olives - Black (5 olives = 1 gm carb) Green (5 olives = 1 gm carb)  Dill pickles (1 whole dill pickle = 2 gm carb)  Sugar free jell-o (0 gm carb)  [place] Chicken Marinate chicken strips in 2 Tbsp sugar free maple syrup, 1 Tbsp lime juice, 1 Tbsp fresh cilantro. [place] or cook in skillet sprayed with oil. (0 gm carb)  Chicken nachos - Heat oven to 350. Rub 5-6 chicken tenders with 1 Tbsp Derek Taco seasoning then drizzle with vegetable oil. Bake at 350 for 3-4 min and  then flip and cook 3-4 min. Top with grated cheese, jalopenos, daniel, green onion. Place back in oven at temp of 425 for 3-4 minutes. Serve with side of 2 Tbsp ranch dressing or sour cream. (3 gm carb)  Egg Mcmuffin: using egg (or egg white for a healthier version) as the bread put one slice of cheese with 1 slice Pakistani daniel or sausage silverio (1 gm carb per sandwich)  Southern Okra - Wash and dry fresh okra. Toss with olive oil, salt and pepper and roast in oven 10-20 minutes. (1 cup = 5 gm carb)  Crispy green beans - Lory 5 lbs fresh green beans. Toss with 1/3 cup melted coconut oil and sprinkle with 4 tsp salt and 1 tsp onion and garlic powder. Bake at 170-175 for 8 hours or dehydrate overnight in . (1 cup = 5 gm carb)  Salt and vinegar zucchini chips - Thinly slice zucchini as thin as possible. In small bowl whisk 2 Tbsp olive oil, 2 Tbsp white vinegar, and 2 tsp sea salt. Add zucchini and dehydrate or bake on 170 for 3-4 hours till crispy. (½ cup = 3 gm carb). Make in large batches and keep in air tight container up to 1 week   Zucchini Derek chips - Thinly slice zucchini as thin as possible. Heat oil in fryer to 350 and drop zucchini in hot oil working in batches of about 20 chips at a time. Remove, drain and sprinkle with Derek Taco seasoning. (1/2 cup = 3 gm carb). Make in large batches and keep in air tight container up to 1 week.  Derek Taco Seasoning - 2 Tbsp chili powder, ½ tsp garlic powder, ½ tsp onion powder, ½ tsp crushed red pepper flakes, ½ tsp dried oregano, 3 tsp ground cumin, 2 tsp sea salt, 2 tsp black pepper. Makes 20 servings (0 gm carb)  [place] milk (1 cup almond milk = 0.3 gm carb)  Cheese chips - Preheat oven 400. On a nonstick baking sheet add cheese slices (Cheddar, Swiss, Provolone, Jaya Juan), bake 10-12 minutes or until browned. Cool completely before removal. (2 slices = 1 gm carb). Store in air tight container up to 1 week.   Breakfast balls - mix together 2 lbs  pork sausage, 1 lb ground beef, 3 eggs, 2 Tbsp dried onion flakes, ½ tsp black pepper, ½ lb sharp cheddar cheese, shredded. Form into 4 dozen 1 balls. Bake on cookie sheet for 25 min at 375. Cool and may be frozen as well individually. (0 gm carb)  Meat muffins - spray muffin tin with cooking spray. Line muffin tin with 1 slice ham. Add 1 raw egg. Top with grated cheese and salt and pepper. Bake 10-12 min. (0 gm carb)  Pork rinds/Cracklings (0 gm carb)  Gummy Bears - Add 1 packet of Sugar free jello (flavor of your choice), 1 packet unflavored gelatin and 1/3 cup cold water to small sauce pan. Stir well and heat over low till it become liquid and dissolved (2-3 minutes). Pour into mold and refrigerate 30-40 minutes. Add only ¼ cup if you want them more firm. (0 gm carb)  Cheese and meat kabobs (0 gm carb)  Garlic parmesan cheese crisps - Preheat oven to 350. On a nonstick baking sheet, place 1 Tbsp grated parmesan cheese, sprinkle with garlic and basil. Bake about 5 minutes or until outside edges become arredondo brown. Cool completely before removal (5 crisps = 1 gm carb)  Antipasti - Pepperoni, salami, green pepper, jalapeno pepper, mushrooms, onion, black olives, cheddar and provolone cheese cubes. Toss with Italian salad dressing. (1/2 cup = 5 gm carb)  [place] chicken wings - (0 gm carb)  Cheetos- preheat oven to 300. Chop ½ cup freshly shredded cheddar cheese that is frozen and place in  or  and chop into tiny pieces. In a mixing bowl, whip 3 egg whites and 1/8 tsp cream of tartar until VERY stiff peaks form. Sprinkle cheese on top of egg whites and then fold cheese into egg whites very carefully. Place mixture into large ziplock bag and cut hole in corner. Gently squeeze onto greased nonstick cookie pan in cheestos like shapes. Sprinkle with parmesan cheese. Bake for 30 minutes. Turn over off and leave in there for another 30 minutes. (1 cup = 1 gm carb)  Cheesy cauliflower tots - preheat  oven to 400. Spray mini muffin tin with nonstick spray. Chop ½ large head of cauliflower into small pieces. Place in microwavable bowl and cover. Microwave 2 minutes. Drain cauliflower. Place in  and pulse till finely chopped. To this add, 1/3 cup grated sharp cheddar, ¼ cup grated parmesan cheese, 2 Tbsp. almond flour, ¼ tsp salt, ½ tsp all purpose seasoning and 1 egg. Mix well. Place 1 Tbsp of mixture in each mini muffin tin. Bake 15 minutes. Remove and flip, bake another 15 minutes. Makes 24 tots. (10 tots = 5 gm carb)  Quest protein bars (carbs vary)  Juan snacks - Preheat oven 350. 1 cup shredded Pepperjack paper. Scoop 1 Tbsp cheese. Place on non stick pan and press slightly flat. Top with 1 slice jalapeno pepper. Bake for 10-12 minutes till brown and crispy. Cool completely before removal. (10 crisps = 1 gm carb)  Snake bite (aka jalapeno poppers) - remove seeds and membrane from jalapenos, fill with cream cheese, wrap in daniel. Stick a toothpick through to hold daniel in place. Bake 15-20 min at 400 (4 bites = 2 gm carb)  5 minute PNB mousse - Beat together 4 oz softened cream cheese, 2 Tbsp natural PNB (no sugar added), ½ tsp vanilla extract and 1/3 cup Splenda. Fold in 1 cup Reddiwhip. Place in container of your choice and refrigerate up to 1 week. Top your serving with 1Tbsp Sugar free chocolate syrup. (1/2 cup = 4 gm carb)  BLT - Fill a leaf of sheyla lettuce leaf with 1 Tbsp LF mchugh, 2 slices daniel, sliced tomato. Sprinkle salt and pepper. (0 gm carb)  Cinnamon and coconut bombs - in microwave safe bowl, mix 1 cup coconut butter, 1 cup coconut milk (full fat canned, not from box), 1 tsp vanilla extract, ½ tsp nutmeg and cinnamon, 1 tsp stevia powder extract. Melt until combined. Place bowl in fridge until hard enough to roll into 10-12 balls, about 30 minutes. Roll balls in 1 cup shredded coconut. Store in refrigerator (1 ball = 1 gm carb)

## 2023-05-11 ENCOUNTER — HOSPITAL ENCOUNTER (OUTPATIENT)
Dept: RADIOLOGY | Facility: HOSPITAL | Age: 73
Discharge: HOME | End: 2023-05-11
Payer: MEDICARE

## 2023-05-11 VITALS — HEIGHT: 67 IN | WEIGHT: 164 LBS | BODY MASS INDEX: 25.74 KG/M2

## 2023-05-11 DIAGNOSIS — Z12.31 OTHER SCREENING MAMMOGRAM: ICD-10-CM

## 2023-05-11 PROCEDURE — 77067 SCR MAMMO BI INCL CAD: CPT | Mod: TC

## 2023-05-24 RX ORDER — DULAGLUTIDE 1.5 MG/.5ML
1.5 INJECTION, SOLUTION SUBCUTANEOUS
Qty: 12 PEN | Refills: 1 | Status: SHIPPED | OUTPATIENT
Start: 2023-05-24 | End: 2023-12-06 | Stop reason: SDUPTHER

## 2023-06-08 ENCOUNTER — PATIENT MESSAGE (OUTPATIENT)
Dept: DIABETES SERVICES | Facility: CLINIC | Age: 73
End: 2023-06-08
Payer: MEDICARE

## 2023-12-06 DIAGNOSIS — E11.65 TYPE 2 DIABETES MELLITUS WITH HYPERGLYCEMIA, WITH LONG-TERM CURRENT USE OF INSULIN: ICD-10-CM

## 2023-12-06 DIAGNOSIS — Z79.4 TYPE 2 DIABETES MELLITUS WITH HYPERGLYCEMIA, WITH LONG-TERM CURRENT USE OF INSULIN: ICD-10-CM

## 2023-12-07 RX ORDER — GABAPENTIN 600 MG/1
600 TABLET ORAL 3 TIMES DAILY
Qty: 270 TABLET | Refills: 1 | Status: SHIPPED | OUTPATIENT
Start: 2023-12-07

## 2023-12-07 RX ORDER — INSULIN GLARGINE 100 [IU]/ML
INJECTION, SOLUTION SUBCUTANEOUS
Qty: 30 ML | Refills: 1 | Status: SHIPPED | OUTPATIENT
Start: 2023-12-07

## 2023-12-07 RX ORDER — EZETIMIBE 10 MG/1
10 TABLET ORAL NIGHTLY
Qty: 90 TABLET | Refills: 1 | Status: SHIPPED | OUTPATIENT
Start: 2023-12-07 | End: 2024-01-17 | Stop reason: SDUPTHER

## 2023-12-07 RX ORDER — DULAGLUTIDE 1.5 MG/.5ML
1.5 INJECTION, SOLUTION SUBCUTANEOUS
Qty: 12 PEN | Refills: 1 | Status: SHIPPED | OUTPATIENT
Start: 2023-12-07 | End: 2024-01-17 | Stop reason: SDUPTHER

## 2023-12-21 ENCOUNTER — OFFICE VISIT (OUTPATIENT)
Dept: DIABETES SERVICES | Facility: CLINIC | Age: 73
End: 2023-12-21
Payer: MEDICARE

## 2023-12-21 VITALS
WEIGHT: 147.38 LBS | HEIGHT: 67 IN | RESPIRATION RATE: 16 BRPM | HEART RATE: 83 BPM | SYSTOLIC BLOOD PRESSURE: 122 MMHG | BODY MASS INDEX: 23.13 KG/M2 | OXYGEN SATURATION: 95 % | DIASTOLIC BLOOD PRESSURE: 62 MMHG

## 2023-12-21 DIAGNOSIS — R06.09 DYSPNEA ON EXERTION: ICD-10-CM

## 2023-12-21 DIAGNOSIS — L08.9 DIABETIC FOOT INFECTION: ICD-10-CM

## 2023-12-21 DIAGNOSIS — E11.65 TYPE 2 DIABETES MELLITUS WITH HYPERGLYCEMIA, WITH LONG-TERM CURRENT USE OF INSULIN: Primary | ICD-10-CM

## 2023-12-21 DIAGNOSIS — M06.9 RHEUMATOID ARTHRITIS, INVOLVING UNSPECIFIED SITE, UNSPECIFIED WHETHER RHEUMATOID FACTOR PRESENT: ICD-10-CM

## 2023-12-21 DIAGNOSIS — E78.2 MIXED HYPERLIPIDEMIA: ICD-10-CM

## 2023-12-21 DIAGNOSIS — Z79.4 TYPE 2 DIABETES MELLITUS WITH HYPERGLYCEMIA, WITH LONG-TERM CURRENT USE OF INSULIN: Primary | ICD-10-CM

## 2023-12-21 DIAGNOSIS — E11.628 DIABETIC FOOT INFECTION: ICD-10-CM

## 2023-12-21 LAB — GLUCOSE SERPL-MCNC: 103 MG/DL (ref 70–110)

## 2023-12-21 PROCEDURE — 99215 OFFICE O/P EST HI 40 MIN: CPT | Mod: PBBFAC | Performed by: NURSE PRACTITIONER

## 2023-12-21 PROCEDURE — 99999PBSHW POCT GLUCOSE, HAND-HELD DEVICE: Mod: PBBFAC,,,

## 2023-12-21 PROCEDURE — 82962 GLUCOSE BLOOD TEST: CPT | Mod: PBBFAC | Performed by: NURSE PRACTITIONER

## 2023-12-21 PROCEDURE — 99214 PR OFFICE/OUTPT VISIT, EST, LEVL IV, 30-39 MIN: ICD-10-PCS | Mod: S$PBB,,, | Performed by: NURSE PRACTITIONER

## 2023-12-21 PROCEDURE — 99999PBSHW POCT GLUCOSE, HAND-HELD DEVICE: ICD-10-PCS | Mod: PBBFAC,,,

## 2023-12-21 PROCEDURE — 99214 OFFICE O/P EST MOD 30 MIN: CPT | Mod: S$PBB,,, | Performed by: NURSE PRACTITIONER

## 2023-12-21 NOTE — PROGRESS NOTES
Subjective:         Patient ID: Nicol Reese is a 73 y.o. female.  Patient's current PCP is Alex Babcock MD.     Chief Complaint: General Diabetes Follow-up (Here for routine follow up. She is checking glucose two times daily. )    HPI  Nicol Reese is a 73 y.o. Black or  female presenting for an established visit for type 2 diabetes. States that am readings are 100s and afternoon readings are upper 100s to low 200s. She complains of some dyspnea upon exertion that is new over the last few weeks,  denies chest pain, nausea, diaphoresis.  No active pain or dyspnea currently.  She reports that she can not complete tasks in the home without stopping to rest.  Unable to ambulate to mailbox without becoming dyspnic. A1c is improved however still elevated. Cholesterol is uncontrolled.     Received diabetes education:yes    At last visit :  Continue present meds, including basal insulin at 30 units daily as she reports am readings of 80-120s.  Lifestyle modifications encouraged  Start trulicity 1.5mg once weekly.  Denies contraindications as discussed.     Screening or Prevention Patient's value Goal    HgA1C Testing and Control   Hemoglobin A1C   Date Value Ref Range Status   05/09/2023 11.4 (A) 4.5 - 6.6 % Final   10/17/2022 10.0 (A) 4.5 - 6.6 % Final      Lab Results   Component Value Date    HGBA1C 8.9 (H) 12/21/2023      Annually/Less than 8%    Lipid profile Lab Results   Component Value Date    CHOL 233 (H) 12/21/2023     Lab Results   Component Value Date    HDL 46 12/21/2023     Lab Results   Component Value Date    LDLCALC 124 12/21/2023     Lab Results   Component Value Date    TRIG 313 (H) 12/21/2023       Lab Results   Component Value Date    CHOLHDL 5.1 12/21/2023      Annually    CMP CMP  Sodium   Date Value Ref Range Status   08/29/2022 137 136 - 145 mmol/L Final     Potassium   Date Value Ref Range Status   08/29/2022 4.7 3.5 - 5.1 mmol/L Final     Chloride   Date Value Ref Range  Status   08/29/2022 102 98 - 107 mmol/L Final     CO2   Date Value Ref Range Status   08/29/2022 23 21 - 32 mmol/L Final     Glucose   Date Value Ref Range Status   08/29/2022 202 (H) 74 - 106 mg/dL Final     BUN   Date Value Ref Range Status   08/29/2022 22 (H) 7 - 18 mg/dL Final     Creatinine   Date Value Ref Range Status   08/29/2022 1.30 (H) 0.55 - 1.02 mg/dL Final     Calcium   Date Value Ref Range Status   08/29/2022 9.5 8.5 - 10.1 mg/dL Final     Total Protein   Date Value Ref Range Status   08/29/2022 7.9 6.4 - 8.2 g/dL Final     Albumin   Date Value Ref Range Status   08/29/2022 3.2 (L) 3.5 - 5.0 g/dL Final     Bilirubin, Total   Date Value Ref Range Status   08/29/2022 0.2 >0.0 - 1.2 mg/dL Final     Alk Phos   Date Value Ref Range Status   08/29/2022 96 55 - 142 U/L Final     AST   Date Value Ref Range Status   08/29/2022 19 15 - 37 U/L Final     ALT   Date Value Ref Range Status   08/29/2022 26 13 - 56 U/L Final     Anion Gap   Date Value Ref Range Status   08/29/2022 17 (H) 7 - 16 mmol/L Final     eGFR   Date Value Ref Range Status   08/29/2022 43 (L) >=60 mL/min/1.73m² Final     Annually    Microalbumin  \  Lab Results   Component Value Date    MICROALBUR 0.9 12/21/2023      Annually     LDL control Lab Results   Component Value Date    CHOL 233 (H) 12/21/2023     Lab Results   Component Value Date    HDL 46 12/21/2023     Lab Results   Component Value Date    LDLCALC 124 12/21/2023     Lab Results   Component Value Date    TRIG 313 (H) 12/21/2023       Lab Results   Component Value Date    CHOLHDL 5.1 12/21/2023      Annually/Less than 100 mg/dl     Nephropathy screening Lab Results   Component Value Date    MICROALBUR 0.9 12/21/2023      Annually    Blood pressure BP Readings from Last 1 Encounters:   12/21/23 122/62    Less than 140/90    Dilated retinal exam Not UTD Annually    Foot exam   : 05/09/2023 Annually            Blood glucose testing is performed regularly. Patient is testing 2 times per  day.  Meter:       Any episodes of hypoglycemia? no    Complications related to diabetes: peripheral vascular disease    Her blood sugar in the clinic today was:   Lab Results   Component Value Date    POCGLU 103 12/21/2023           Review of Systems   Constitutional:  Negative for activity change, appetite change, diaphoresis and fatigue.   HENT:  Negative for nasal congestion, facial swelling and sinus pressure/congestion.    Eyes:  Negative for visual disturbance.   Respiratory:  Negative for shortness of breath and wheezing.    Cardiovascular:  Negative for chest pain and leg swelling.   Gastrointestinal:  Negative for constipation, diarrhea, nausea and vomiting.   Endocrine: Negative for polydipsia, polyphagia and polyuria.   Genitourinary:  Negative for dysuria, frequency and urgency.   Musculoskeletal:  Negative for gait problem and myalgias.   Integumentary:  Negative for color change, rash and wound.   Neurological:  Negative for dizziness, syncope, weakness, headaches and coordination difficulties.   Hematological:  Does not bruise/bleed easily.   Psychiatric/Behavioral:  Negative for self-injury, sleep disturbance and suicidal ideas. The patient is not nervous/anxious.         Objective:      Vitals:    12/21/23 1405   BP: 122/62   Pulse: 83   Resp: 16     Physical Exam  Vitals and nursing note reviewed.   Constitutional:       Appearance: Normal appearance.   HENT:      Head: Normocephalic.   Cardiovascular:      Rate and Rhythm: Normal rate and regular rhythm.      Heart sounds: Normal heart sounds.   Pulmonary:      Effort: Pulmonary effort is normal.      Breath sounds: Normal breath sounds.   Musculoskeletal:         General: Normal range of motion.      Right lower leg: No edema.      Left lower leg: No edema.   Skin:     General: Skin is warm and dry.   Neurological:      General: No focal deficit present.      Mental Status: She is alert and oriented to person, place, and time.   Psychiatric:          Mood and Affect: Mood normal.         Behavior: Behavior normal.         Thought Content: Thought content normal.         Judgment: Judgment normal.        Assessment/Plan   1. Type 2 diabetes mellitus with hyperglycemia, with long-term current use of insulin  No change in meds, will do A1c in main lab  Lifestyle modifications encouraged   -     POCT Glucose, Hand-Held Device  -     Comprehensive Metabolic Panel; Future; Expected date: 12/21/2023  -     Lipid Panel; Future; Expected date: 12/21/2023  -     Microalbumin/Creatinine Ratio, Urine; Future; Expected date: 12/21/2023  -     Hemoglobin A1C; Future; Expected date: 12/21/2023  -     Ambulatory referral/consult to Cardiology; Future; Expected date: 12/28/2023    2. Mixed hyperlipidemia  Zetia, intolerant to statins      3. Diabetic foot infection  Overview:  Seeing wound care weekly  4. Dyspnea on exertion  -     Ambulatory referral/consult to Cardiology; Future; Expected date: 12/28/2023    5. Rheumatoid arthritis, involving unspecified site, unspecified whether rheumatoid factor present            - Follow up: 3 months      I spent a total of 18 minutes on the day of the visit.This includes face to face time and non-face to face time preparing to see the patient (eg, review of tests), documenting clinical information in the electronic record, independently interpreting results and communicating results to the patient.    Nidia Donahue FNP-BC ADM-BC  [facility]

## 2023-12-27 PROBLEM — R06.09 DYSPNEA ON EXERTION: Status: ACTIVE | Noted: 2023-12-27

## 2024-01-08 ENCOUNTER — PATIENT MESSAGE (OUTPATIENT)
Dept: DIABETES SERVICES | Facility: CLINIC | Age: 74
End: 2024-01-08
Payer: MEDICARE

## 2024-01-17 DIAGNOSIS — E11.65 TYPE 2 DIABETES MELLITUS WITH HYPERGLYCEMIA, UNSPECIFIED WHETHER LONG TERM INSULIN USE: Primary | ICD-10-CM

## 2024-01-20 RX ORDER — DULAGLUTIDE 1.5 MG/.5ML
1.5 INJECTION, SOLUTION SUBCUTANEOUS
Qty: 12 PEN | Refills: 1 | Status: SHIPPED | OUTPATIENT
Start: 2024-01-20 | End: 2024-02-21 | Stop reason: SDUPTHER

## 2024-01-20 RX ORDER — EZETIMIBE 10 MG/1
10 TABLET ORAL NIGHTLY
Qty: 90 TABLET | Refills: 1 | Status: SHIPPED | OUTPATIENT
Start: 2024-01-20

## 2024-02-21 DIAGNOSIS — E11.65 TYPE 2 DIABETES MELLITUS WITH HYPERGLYCEMIA, UNSPECIFIED WHETHER LONG TERM INSULIN USE: ICD-10-CM

## 2024-02-21 RX ORDER — DULAGLUTIDE 1.5 MG/.5ML
1.5 INJECTION, SOLUTION SUBCUTANEOUS
Qty: 12 PEN | Refills: 1 | Status: SHIPPED | OUTPATIENT
Start: 2024-02-21

## 2024-04-02 ENCOUNTER — TELEPHONE (OUTPATIENT)
Dept: NEPHROLOGY | Facility: CLINIC | Age: 74
End: 2024-04-02
Payer: MEDICARE

## 2024-04-02 DIAGNOSIS — Z79.4 TYPE 2 DIABETES MELLITUS WITH HYPERGLYCEMIA, WITH LONG-TERM CURRENT USE OF INSULIN: ICD-10-CM

## 2024-04-02 DIAGNOSIS — E11.65 TYPE 2 DIABETES MELLITUS WITH HYPERGLYCEMIA, WITH LONG-TERM CURRENT USE OF INSULIN: ICD-10-CM

## 2024-04-03 ENCOUNTER — TELEPHONE (OUTPATIENT)
Dept: DIABETES SERVICES | Facility: CLINIC | Age: 74
End: 2024-04-03
Payer: MEDICARE

## 2024-04-03 ENCOUNTER — PATIENT MESSAGE (OUTPATIENT)
Dept: DIABETES SERVICES | Facility: CLINIC | Age: 74
End: 2024-04-03
Payer: MEDICARE

## 2024-04-04 RX ORDER — METFORMIN HYDROCHLORIDE 1000 MG/1
1000 TABLET ORAL 2 TIMES DAILY WITH MEALS
Qty: 180 TABLET | Refills: 3 | Status: SHIPPED | OUTPATIENT
Start: 2024-04-04

## 2024-04-04 NOTE — TELEPHONE ENCOUNTER
Sent patient in-basket message to notify them of cancellation of their Diabetes Management appointment with Nidia Donahue due to provider no longer practicing at [facility] effective April 19th 2024. Patient was instructed to follow up with their preferred primary care provider for continued care.

## 2024-04-30 ENCOUNTER — TELEPHONE (OUTPATIENT)
Dept: OBSTETRICS AND GYNECOLOGY | Facility: CLINIC | Age: 74
End: 2024-04-30
Payer: MEDICARE

## 2024-05-01 ENCOUNTER — OFFICE VISIT (OUTPATIENT)
Dept: CARDIOLOGY | Facility: CLINIC | Age: 74
End: 2024-05-01
Payer: MEDICARE

## 2024-05-01 VITALS
HEIGHT: 67 IN | RESPIRATION RATE: 18 BRPM | WEIGHT: 151 LBS | OXYGEN SATURATION: 95 % | BODY MASS INDEX: 23.7 KG/M2 | DIASTOLIC BLOOD PRESSURE: 70 MMHG | SYSTOLIC BLOOD PRESSURE: 130 MMHG | HEART RATE: 91 BPM

## 2024-05-01 DIAGNOSIS — Z01.810 PRE-OPERATIVE CARDIOVASCULAR EXAM, NEW EKG ABNORMALITIES C/W ISCHEMIA: ICD-10-CM

## 2024-05-01 DIAGNOSIS — E11.65 TYPE 2 DIABETES MELLITUS WITH HYPERGLYCEMIA, WITH LONG-TERM CURRENT USE OF INSULIN: ICD-10-CM

## 2024-05-01 DIAGNOSIS — Z79.4 TYPE 2 DIABETES MELLITUS WITH HYPERGLYCEMIA, WITH LONG-TERM CURRENT USE OF INSULIN: ICD-10-CM

## 2024-05-01 DIAGNOSIS — Z13.6 ENCOUNTER FOR SCREENING FOR CARDIOVASCULAR DISORDERS: Primary | ICD-10-CM

## 2024-05-01 DIAGNOSIS — R06.09 DYSPNEA ON EXERTION: Primary | ICD-10-CM

## 2024-05-01 DIAGNOSIS — R94.31 PRE-OPERATIVE CARDIOVASCULAR EXAM, NEW EKG ABNORMALITIES C/W ISCHEMIA: ICD-10-CM

## 2024-05-01 DIAGNOSIS — R06.09 DYSPNEA ON EXERTION: ICD-10-CM

## 2024-05-01 PROCEDURE — 93005 ELECTROCARDIOGRAM TRACING: CPT | Mod: PBBFAC | Performed by: INTERNAL MEDICINE

## 2024-05-01 PROCEDURE — 93010 ELECTROCARDIOGRAM REPORT: CPT | Mod: S$PBB,,, | Performed by: INTERNAL MEDICINE

## 2024-05-01 PROCEDURE — 99215 OFFICE O/P EST HI 40 MIN: CPT | Mod: PBBFAC,25 | Performed by: INTERNAL MEDICINE

## 2024-05-01 PROCEDURE — 99205 OFFICE O/P NEW HI 60 MIN: CPT | Mod: S$PBB,,, | Performed by: INTERNAL MEDICINE

## 2024-05-01 RX ORDER — ISOSORBIDE MONONITRATE 30 MG/1
30 TABLET, EXTENDED RELEASE ORAL DAILY
Qty: 30 TABLET | Refills: 11 | Status: SHIPPED | OUTPATIENT
Start: 2024-05-01 | End: 2025-05-01

## 2024-05-01 NOTE — PROGRESS NOTES
PCP: Alex Babcock MD    Referring Provider:     Subjective:   Nicol Reese is a 73 y.o. female with hx of DM, hypertension, neuroparthy who presents for  evaluation of shortness of breath.    Pt reports five month history of progressive SOB with activity, also notes dizziness with activity. She also notes increasing fatigue with minimal activity.  She denies chest pain, pressure or palpitations.  She now has to stop and rest climbing ten steps into her home.  Notes increased lower extremity edema, bilat, had decreased blood flow in right leg, was started on Plavix and ASA 81 mg q d.  Did not start walking program.  She has had ulcer on great toe on left, slowly healing, following in vein center.         Fhx:  Family History   Problem Relation Name Age of Onset    Cancer Mother      Alzheimer's disease Father      Cancer Sister      Breast cancer Sister      Arthritis Sister      Diabetes Sister      Thyroid disease Sister      Diabetes Sister      Arthritis Sister      Diabetes Sister      Diabetes Brother      Dementia Brother      Diabetes Brother      Diabetes Brother      Seizures Brother      No Known Problems Brother      Diabetes Brother      Hypertension Brother        Past Surgical History:   Procedure Laterality Date    DEBRIDEMENT OF LOWER EXTREMITY Left 03/28/2022    Procedure: DEBRIDEMENT, LOWER EXTREMITY;  Surgeon: Kobi Jacome DO;  Location: [facility] OR;  Service: General;  Laterality: Left;    HYSTERECTOMY      TOE AMPUTATION Left 03/28/2022    Procedure: AMPUTATION, TOE;  Surgeon: Kobi Jacome DO;  Location: [facility] OR;  Service: General;  Laterality: Left;  possible left 2nd toe amp        EKG - NSR, inferior ischemia    ECHO - No results found for this or any previous visit.       CATH - No results found for this or any previous visit.       Stress - No results found for this or any previous visit.       Lab Results   Component Value Date     12/21/2023     K 4.8 12/21/2023     12/21/2023    CO2 25 12/21/2023    BUN 32 (H) 12/21/2023    CREATININE 1.02 12/21/2023    CALCIUM 9.9 12/21/2023    ANIONGAP 13 12/21/2023    EGFRNONAA 75 03/29/2022       Lab Results   Component Value Date    CHOL 233 (H) 12/21/2023     Lab Results   Component Value Date    HDL 46 12/21/2023     Lab Results   Component Value Date    LDLCALC 124 12/21/2023     Lab Results   Component Value Date    TRIG 313 (H) 12/21/2023     Lab Results   Component Value Date    CHOLHDL 5.1 12/21/2023       Lab Results   Component Value Date    WBC 12.28 (H) 08/29/2022    HGB 10.6 (L) 08/29/2022    HCT 33.4 (L) 08/29/2022    MCV 86.5 08/29/2022     08/29/2022           Current Outpatient Medications:     calcium carbonate (OS-KUSH) 600 mg calcium (1,500 mg) Tab, Take 600 mg by mouth once daily., Disp: , Rfl:     cholecalciferol, vitamin D3, (VITAMIN D3) 10 mcg (400 unit) Tab, Take 400 Units by mouth once daily., Disp: , Rfl:     dulaglutide (TRULICITY) 1.5 mg/0.5 mL pen injector, Inject 1.5 mg into the skin every 7 days., Disp: 12 pen , Rfl: 1    empagliflozin (JARDIANCE) 25 mg tablet, Take 1 tablet (25 mg total) by mouth once daily., Disp: 90 tablet, Rfl: 1    estradioL (ESTRACE) 0.5 MG tablet, Take 0.5 mg by mouth once daily., Disp: , Rfl:     ezetimibe (ZETIA) 10 mg tablet, Take 1 tablet (10 mg total) by mouth every evening. Take one tablet by mouth daily, Disp: 90 tablet, Rfl: 1    gabapentin (NEURONTIN) 600 MG tablet, Take 1 tablet (600 mg total) by mouth 3 (three) times daily., Disp: 270 tablet, Rfl: 1    HYDROcodone-acetaminophen (NORCO) 7.5-325 mg per tablet, Take 1 tablet by mouth every 6 (six) hours as needed., Disp: 15 tablet, Rfl: 0    insulin (LANTUS SOLOSTAR U-100 INSULIN) glargine 100 units/mL SubQ pen, Inject 30 units sq once daily, Disp: 30 mL, Rfl: 1    loratadine (CLARITIN) 10 mg tablet, Take 10 mg by mouth once daily., Disp: , Rfl:     meloxicam (MOBIC) 7.5 MG  tablet, Take one tablet by mouth daily, Disp: , Rfl:     metFORMIN (GLUCOPHAGE) 1000 MG tablet, Take 1 tablet (1,000 mg total) by mouth 2 (two) times daily with meals., Disp: 180 tablet, Rfl: 3    methocarbamoL (ROBAXIN) 500 MG Tab, Take one tablet by mouth every 6 hours as needed, Disp: , Rfl:     ciprofloxacin HCl (CIPRO) 500 MG tablet, Take one tablet by mouth every 12 hours (Patient not taking: Reported on 5/1/2024), Disp: , Rfl:     dexAMETHasone (DECADRON) 4 MG Tab, Take one tablet by mouth every day for three days, repeat as needed (Patient not taking: Reported on 5/1/2024), Disp: , Rfl:     diclofenac-misoprostol  mg-mcg (ARTHROTEC 75)  mg-mcg per tablet, Take 1 tablet by mouth 2 (two) times daily., Disp: , Rfl:     mupirocin (BACTROBAN) 2 % ointment, Apply topically 3 (three) times daily. (Patient not taking: Reported on 12/21/2023), Disp: 45 g, Rfl: 0    Review of Systems   Constitutional: Negative for diaphoresis, malaise/fatigue, night sweats and weight gain.   HENT:  Negative for congestion, ear pain, hearing loss, nosebleeds and sore throat.    Eyes:  Negative for blurred vision, double vision, pain, photophobia and visual disturbance.        Bilateral cataract surgery   Cardiovascular:  Positive for dyspnea on exertion, leg swelling and orthopnea. Negative for chest pain, claudication, irregular heartbeat, near-syncope, palpitations and syncope.   Respiratory:  Negative for cough, shortness of breath, sleep disturbances due to breathing, snoring and wheezing.    Endocrine: Negative for cold intolerance, heat intolerance, polydipsia, polyphagia and polyuria.        DM x 30 years plus  Monitors blood sugars bid,  HbA1C   Hematologic/Lymphatic: Negative for bleeding problem. Does not bruise/bleed easily.   Skin:  Negative for dry skin, flushing, itching, rash and skin cancer.   Musculoskeletal:  Negative for arthritis, back pain, falls, joint pain, muscle cramps, muscle weakness and  "myalgias.   Gastrointestinal:  Negative for abdominal pain, change in bowel habit, constipation, diarrhea, dysphagia, heartburn, nausea and vomiting.   Genitourinary:  Negative for bladder incontinence, dysuria, flank pain, frequency and nocturia.   Neurological:  Negative for dizziness, focal weakness, headaches, light-headedness, loss of balance, numbness, paresthesias and seizures.   Psychiatric/Behavioral:  Negative for depression, memory loss and substance abuse. The patient is not nervous/anxious.    Allergic/Immunologic: Negative for environmental allergies.          Objective:   /70   Pulse 91   Resp 18   Ht 5' 7" (1.702 m)   Wt 68.5 kg (151 lb)   SpO2 95%   BMI 23.65 kg/m²       Physical Exam  Vitals and nursing note reviewed.   Constitutional:       Appearance: Normal appearance. She is obese.   HENT:      Head: Normocephalic and atraumatic.      Right Ear: External ear normal.      Left Ear: External ear normal.   Eyes:      General: No scleral icterus.        Right eye: No discharge.         Left eye: No discharge.      Extraocular Movements: Extraocular movements intact.      Conjunctiva/sclera: Conjunctivae normal.      Pupils: Pupils are equal, round, and reactive to light.   Cardiovascular:      Rate and Rhythm: Normal rate and regular rhythm.      Pulses: Normal pulses.      Heart sounds: Normal heart sounds. No murmur heard.     No friction rub. No gallop.   Pulmonary:      Effort: Pulmonary effort is normal.      Breath sounds: Normal breath sounds. No wheezing, rhonchi or rales.   Chest:      Chest wall: No tenderness.   Abdominal:      General: Abdomen is flat. Bowel sounds are normal. There is no distension.      Palpations: Abdomen is soft.      Tenderness: There is no abdominal tenderness. There is no guarding or rebound.   Musculoskeletal:         General: No swelling or tenderness. Normal range of motion.      Cervical back: Normal range of motion and neck supple.      Right " lower leg: Edema present.      Left lower leg: Edema present.   Skin:     General: Skin is warm and dry.      Findings: No erythema or rash.   Neurological:      General: No focal deficit present.      Mental Status: She is alert and oriented to person, place, and time.      Cranial Nerves: No cranial nerve deficit.      Motor: No weakness.      Gait: Gait normal.   Psychiatric:         Mood and Affect: Mood normal.         Behavior: Behavior normal.         Thought Content: Thought content normal.         Judgment: Judgment normal.     Assessment:     1. Type 2 diabetes mellitus with hyperglycemia, with long-term current use of insulin  Ambulatory referral/consult to Cardiology      2. Dyspnea on exertion  Ambulatory referral/consult to Cardiology            Plan:   Dyspnea on exertion, new, will order lexiscan cardiolyte to evaluate for ischemic substrate,  Lower extremity edema: order echo to eval LV function, suspect CHF  PVD: previous amputation second toe on right foot, non-healing ulcer left great toe, ischemic rest pain at night  DM: not well controlled  5.   Hyperlilpidemia: unable to tolerate statins due to muscle cramps.   6.   Abnormal EKG. Suggestive of inferior ischemia, await results of stress imaging, start Imdur 30 mg q d.

## 2024-05-09 LAB
OHS QRS DURATION: 80 MS
OHS QTC CALCULATION: 432 MS

## 2024-05-14 ENCOUNTER — HOSPITAL ENCOUNTER (OUTPATIENT)
Dept: RADIOLOGY | Facility: HOSPITAL | Age: 74
Discharge: HOME | End: 2024-05-14
Attending: NURSE PRACTITIONER
Payer: MEDICARE

## 2024-05-14 VITALS — HEIGHT: 67 IN | WEIGHT: 150 LBS | BODY MASS INDEX: 23.54 KG/M2

## 2024-05-14 DIAGNOSIS — Z12.31 ENCOUNTER FOR SCREENING MAMMOGRAM FOR MALIGNANT NEOPLASM OF BREAST: ICD-10-CM

## 2024-05-14 PROCEDURE — 77067 SCR MAMMO BI INCL CAD: CPT | Mod: TC

## 2024-05-16 ENCOUNTER — HOSPITAL ENCOUNTER (OUTPATIENT)
Dept: RADIOLOGY | Facility: HOSPITAL | Age: 74
Discharge: HOME | End: 2024-05-16
Attending: STUDENT IN AN ORGANIZED HEALTH CARE EDUCATION/TRAINING PROGRAM
Payer: MEDICARE

## 2024-05-16 DIAGNOSIS — R92.8 ABNORMAL MAMMOGRAM: ICD-10-CM

## 2024-05-16 PROCEDURE — 77065 DX MAMMO INCL CAD UNI: CPT | Mod: TC,LT

## 2024-05-16 PROCEDURE — 77061 BREAST TOMOSYNTHESIS UNI: CPT | Mod: TC,LT

## 2024-05-31 ENCOUNTER — HOSPITAL ENCOUNTER (OUTPATIENT)
Dept: CARDIOLOGY | Facility: HOSPITAL | Age: 74
Discharge: HOME | End: 2024-05-31
Attending: INTERNAL MEDICINE
Payer: MEDICARE

## 2024-05-31 ENCOUNTER — HOSPITAL ENCOUNTER (OUTPATIENT)
Dept: RADIOLOGY | Facility: HOSPITAL | Age: 74
Discharge: HOME | End: 2024-05-31
Attending: INTERNAL MEDICINE
Payer: MEDICARE

## 2024-05-31 VITALS
DIASTOLIC BLOOD PRESSURE: 77 MMHG | BODY MASS INDEX: 23.49 KG/M2 | SYSTOLIC BLOOD PRESSURE: 149 MMHG | HEART RATE: 93 BPM | HEIGHT: 67 IN

## 2024-05-31 VITALS — BODY MASS INDEX: 23.54 KG/M2 | HEIGHT: 67 IN | WEIGHT: 150 LBS

## 2024-05-31 DIAGNOSIS — Z79.4 TYPE 2 DIABETES MELLITUS WITH HYPERGLYCEMIA, WITH LONG-TERM CURRENT USE OF INSULIN: ICD-10-CM

## 2024-05-31 DIAGNOSIS — R94.31 PRE-OPERATIVE CARDIOVASCULAR EXAM, NEW EKG ABNORMALITIES C/W ISCHEMIA: ICD-10-CM

## 2024-05-31 DIAGNOSIS — R06.09 DYSPNEA ON EXERTION: ICD-10-CM

## 2024-05-31 DIAGNOSIS — Z01.810 PRE-OPERATIVE CARDIOVASCULAR EXAM, NEW EKG ABNORMALITIES C/W ISCHEMIA: ICD-10-CM

## 2024-05-31 DIAGNOSIS — E11.65 TYPE 2 DIABETES MELLITUS WITH HYPERGLYCEMIA, WITH LONG-TERM CURRENT USE OF INSULIN: ICD-10-CM

## 2024-05-31 DIAGNOSIS — Z13.6 ENCOUNTER FOR SCREENING FOR CARDIOVASCULAR DISORDERS: ICD-10-CM

## 2024-05-31 PROCEDURE — 78452 HT MUSCLE IMAGE SPECT MULT: CPT | Mod: 26,,, | Performed by: INTERNAL MEDICINE

## 2024-05-31 PROCEDURE — A9500 TC99M SESTAMIBI: HCPCS | Performed by: INTERNAL MEDICINE

## 2024-05-31 PROCEDURE — 78452 HT MUSCLE IMAGE SPECT MULT: CPT | Mod: TC

## 2024-05-31 PROCEDURE — 63600175 PHARM REV CODE 636 W HCPCS: Performed by: INTERNAL MEDICINE

## 2024-05-31 PROCEDURE — 93016 CV STRESS TEST SUPVJ ONLY: CPT | Mod: ,,, | Performed by: INTERNAL MEDICINE

## 2024-05-31 PROCEDURE — 93306 TTE W/DOPPLER COMPLETE: CPT | Mod: 26,,, | Performed by: INTERNAL MEDICINE

## 2024-05-31 PROCEDURE — 93306 TTE W/DOPPLER COMPLETE: CPT

## 2024-05-31 PROCEDURE — 93018 CV STRESS TEST I&R ONLY: CPT | Mod: ,,, | Performed by: INTERNAL MEDICINE

## 2024-05-31 PROCEDURE — 93017 CV STRESS TEST TRACING ONLY: CPT

## 2024-05-31 RX ORDER — REGADENOSON 0.08 MG/ML
0.4 INJECTION, SOLUTION INTRAVENOUS
Status: COMPLETED | OUTPATIENT
Start: 2024-05-31 | End: 2024-05-31

## 2024-05-31 RX ORDER — TETRAKIS(2-METHOXYISOBUTYLISOCYANIDE)COPPER(I) TETRAFLUOROBORATE 1 MG/ML
34.4 INJECTION, POWDER, LYOPHILIZED, FOR SOLUTION INTRAVENOUS
Status: COMPLETED | OUTPATIENT
Start: 2024-05-31 | End: 2024-05-31

## 2024-05-31 RX ORDER — TETRAKIS(2-METHOXYISOBUTYLISOCYANIDE)COPPER(I) TETRAFLUOROBORATE 1 MG/ML
10.7 INJECTION, POWDER, LYOPHILIZED, FOR SOLUTION INTRAVENOUS
Status: COMPLETED | OUTPATIENT
Start: 2024-05-31 | End: 2024-05-31

## 2024-05-31 RX ADMIN — REGADENOSON 0.4 MG: 0.08 INJECTION, SOLUTION INTRAVENOUS at 09:05

## 2024-05-31 RX ADMIN — KIT FOR THE PREPARATION OF TECHNETIUM TC99M SESTAMIBI 10.7 MILLICURIE: 1 INJECTION, POWDER, LYOPHILIZED, FOR SOLUTION PARENTERAL at 08:05

## 2024-05-31 RX ADMIN — KIT FOR THE PREPARATION OF TECHNETIUM TC99M SESTAMIBI 34.4 MILLICURIE: 1 INJECTION, POWDER, LYOPHILIZED, FOR SOLUTION PARENTERAL at 09:05

## 2024-06-11 LAB
AORTIC ROOT ANNULUS: 3.08 CM
AORTIC VALVE CUSP SEPERATION: 1.27 CM
AV INDEX (PROSTH): 0.78
AV MEAN GRADIENT: 7 MMHG
AV PEAK GRADIENT: 10 MMHG
AV VALVE AREA BY VELOCITY RATIO: 1.88 CM²
AV VALVE AREA: 2.34 CM²
AV VELOCITY RATIO: 0.63
BSA FOR ECHO PROCEDURE: 1.79 M2
CV ECHO LV RWT: 0.6 CM
CV STRESS BASE HR: 93 BPM
DIASTOLIC BLOOD PRESSURE: 77 MMHG
DOP CALC AO PEAK VEL: 1.6 M/S
DOP CALC AO VTI: 23.7 CM
DOP CALC LVOT AREA: 3 CM2
DOP CALC LVOT DIAMETER: 1.96 CM
DOP CALC LVOT PEAK VEL: 1 M/S
DOP CALC LVOT STROKE VOLUME: 55.49 CM3
DOP CALCLVOT PEAK VEL VTI: 18.4 CM
E WAVE DECELERATION TIME: 162.84 MSEC
E/A RATIO: 0.55
E/E' RATIO: 17.27 M/S
ECHO LV POSTERIOR WALL: 1.14 CM (ref 0.6–1.1)
FRACTIONAL SHORTENING: 37 % (ref 28–44)
INTERVENTRICULAR SEPTUM: 1.22 CM (ref 0.6–1.1)
IVC DIAMETER: 0.87 CM
LEFT ATRIUM VOLUME INDEX MOD: 23.2 ML/M2
LEFT ATRIUM VOLUME MOD: 41.55 CM3
LEFT INTERNAL DIMENSION IN SYSTOLE: 2.38 CM (ref 2.1–4)
LEFT VENTRICLE DIASTOLIC VOLUME INDEX: 34.32 ML/M2
LEFT VENTRICLE DIASTOLIC VOLUME: 61.44 ML
LEFT VENTRICLE MASS INDEX: 83 G/M2
LEFT VENTRICLE SYSTOLIC VOLUME INDEX: 11 ML/M2
LEFT VENTRICLE SYSTOLIC VOLUME: 19.71 ML
LEFT VENTRICULAR INTERNAL DIMENSION IN DIASTOLE: 3.79 CM (ref 3.5–6)
LEFT VENTRICULAR MASS: 148.84 G
LV LATERAL E/E' RATIO: 13.57 M/S
LV SEPTAL E/E' RATIO: 23.75 M/S
LVOT MG: 1.94 MMHG
LVOT MV: 0.64 CM/S
MV PEAK A VEL: 1.72 M/S
MV PEAK E VEL: 0.95 M/S
MV STENOSIS PRESSURE HALF TIME: 47.22 MS
MV VALVE AREA P 1/2 METHOD: 4.66 CM2
OHS CV CPX 1 MINUTE RECOVERY HEART RATE: 107 BPM
OHS CV CPX 85 PERCENT MAX PREDICTED HEART RATE MALE: 123
OHS CV CPX MAX PREDICTED HEART RATE: 145
OHS CV CPX PATIENT IS FEMALE: 1
OHS CV CPX PATIENT IS MALE: 0
OHS CV CPX PEAK DIASTOLIC BLOOD PRESSURE: 77 MMHG
OHS CV CPX PEAK HEAR RATE: 114 BPM
OHS CV CPX PEAK RATE PRESSURE PRODUCT: NORMAL
OHS CV CPX PEAK SYSTOLIC BLOOD PRESSURE: 149 MMHG
OHS CV CPX PERCENT MAX PREDICTED HEART RATE ACHIEVED: 81
OHS CV CPX RATE PRESSURE PRODUCT PRESENTING: NORMAL
PISA MRMAX VEL: 5.34 M/S
PISA TR MAX VEL: 2.67 M/S
PV PEAK GRADIENT: 4 MMHG
PV PEAK VELOCITY: 0.97 M/S
RA PRESSURE ESTIMATED: 3 MMHG
RA VOL SYS: 17.74 ML
RIGHT ATRIAL AREA: 9.7 CM2
RIGHT VENTRICULAR LENGTH IN DIASTOLE (APICAL 4-CHAMBER VIEW): 5.03 CM
RV MID DIAMA: 2.14 CM
RV TB RVSP: 6 MMHG
SYSTOLIC BLOOD PRESSURE: 149 MMHG
TDI LATERAL: 0.07 M/S
TDI SEPTAL: 0.04 M/S
TDI: 0.06 M/S
TR MAX PG: 29 MMHG
TRICUSPID ANNULAR PLANE SYSTOLIC EXCURSION: 1.86 CM
TV REST PULMONARY ARTERY PRESSURE: 32 MMHG
Z-SCORE OF LEFT VENTRICULAR DIMENSION IN END DIASTOLE: -2.67
Z-SCORE OF LEFT VENTRICULAR DIMENSION IN END SYSTOLE: -1.98

## 2024-07-02 ENCOUNTER — OFFICE VISIT (OUTPATIENT)
Dept: CARDIOLOGY | Facility: CLINIC | Age: 74
End: 2024-07-02
Payer: MEDICARE

## 2024-07-02 ENCOUNTER — HOSPITAL ENCOUNTER (OUTPATIENT)
Dept: CARDIOLOGY | Facility: HOSPITAL | Age: 74
Discharge: HOME | End: 2024-07-02
Attending: INTERNAL MEDICINE
Payer: MEDICARE

## 2024-07-02 VITALS
SYSTOLIC BLOOD PRESSURE: 118 MMHG | OXYGEN SATURATION: 96 % | DIASTOLIC BLOOD PRESSURE: 62 MMHG | HEIGHT: 67 IN | WEIGHT: 153 LBS | BODY MASS INDEX: 24.01 KG/M2 | HEART RATE: 88 BPM

## 2024-07-02 DIAGNOSIS — Z13.6 ENCOUNTER FOR SCREENING FOR CARDIOVASCULAR DISORDERS: ICD-10-CM

## 2024-07-02 DIAGNOSIS — E78.2 MIXED HYPERLIPIDEMIA: ICD-10-CM

## 2024-07-02 DIAGNOSIS — R06.09 DYSPNEA ON EXERTION: ICD-10-CM

## 2024-07-02 DIAGNOSIS — R94.31 PRE-OPERATIVE CARDIOVASCULAR EXAM, NEW EKG ABNORMALITIES C/W ISCHEMIA: ICD-10-CM

## 2024-07-02 DIAGNOSIS — R06.09 DYSPNEA ON EXERTION: Primary | ICD-10-CM

## 2024-07-02 DIAGNOSIS — Z01.810 PRE-OPERATIVE CARDIOVASCULAR EXAM, NEW EKG ABNORMALITIES C/W ISCHEMIA: ICD-10-CM

## 2024-07-02 PROCEDURE — 99215 OFFICE O/P EST HI 40 MIN: CPT | Mod: PBBFAC,25 | Performed by: INTERNAL MEDICINE

## 2024-07-02 PROCEDURE — 93246 EXT ECG>7D<15D RECORDING: CPT

## 2024-07-02 PROCEDURE — 99214 OFFICE O/P EST MOD 30 MIN: CPT | Mod: S$PBB,,, | Performed by: INTERNAL MEDICINE

## 2024-07-02 PROCEDURE — 99999 PR PBB SHADOW E&M-EST. PATIENT-LVL V: CPT | Mod: PBBFAC,,, | Performed by: INTERNAL MEDICINE

## 2024-07-02 NOTE — PROGRESS NOTES
PCP: Alex Babcock MD    Referring Provider:     Subjective:   Nicol Reese is a 73 y.o. female with hx of DM, hypertension, neuroparthy who presents for  evaluation of shortness of breath.    Pt reports five month history of progressive SOB with activity, also notes dizziness with activity. She also notes increasing fatigue with minimal activity.  She denies chest pain, pressure or palpitations.  She now has to stop and rest climbing ten steps into her home.  Notes increased lower extremity edema, bilat, had decreased blood flow in right leg, was started on Plavix and ASA 81 mg q d.  Did not start walking program.  She has had ulcer on great toe on left, slowly healing, following in vein center.         Fhx:  Family History   Problem Relation Name Age of Onset    Cancer Mother      Alzheimer's disease Father      Cancer Sister      Breast cancer Sister      Arthritis Sister      Diabetes Sister      Thyroid disease Sister      Diabetes Sister      Arthritis Sister      Diabetes Sister      Diabetes Brother      Dementia Brother      Diabetes Brother      Diabetes Brother      Seizures Brother      No Known Problems Brother      Diabetes Brother      Hypertension Brother        Past Surgical History:   Procedure Laterality Date    DEBRIDEMENT OF LOWER EXTREMITY Left 03/28/2022    Procedure: DEBRIDEMENT, LOWER EXTREMITY;  Surgeon: Kobi Jacome DO;  Location: [facility] OR;  Service: General;  Laterality: Left;    HYSTERECTOMY      OOPHORECTOMY      TOE AMPUTATION Left 03/28/2022    Procedure: AMPUTATION, TOE;  Surgeon: Kobi Jacome DO;  Location: [facility] OR;  Service: General;  Laterality: Left;  possible left 2nd toe amp        EKG - NSR, inferior ischemia    ECHO - Results for orders placed during the hospital encounter of 05/31/24    Echo    Interpretation Summary    Left Ventricle: The left ventricle is normal in size. Increased wall thickness. Mild septal thickening. There is normal systolic  function with a visually estimated ejection fraction of 65 - 70%. There is normal diastolic function.    Right Ventricle: Normal right ventricular cavity size. Systolic function is normal.    Left Atrium: Left atrium is dilated.    Aortic Valve: The aortic valve is a trileaflet valve. There is aortic valve sclerosis.    Mitral Valve: Mildly calcified leaflets. There is mild mitral annular calcification present. There is moderate regurgitation.    Tricuspid Valve: There is moderate regurgitation.    IVC/SVC: Normal venous pressure at 3 mmHg.    Pericardium: Left pleural effusion.         CATH - No results found for this or any previous visit.      Show images for NM Myocardial Perfusion Spect Multi Pharmacologic  All Reviewers List    Miguel Delaney DO on 6/11/2024 11:14     NM Myocardial Perfusion Spect Multi Pharmacologic  Order: [id number]  Status: Final result       Visible to patient: Yes (seen)       Next appt: 05/20/2025 at 12:40 PM in Radiology ([id number])       Dx: Dyspnea on exertion; Pre-operative ca...    0 Result Notes  Details    Reading Physician Reading Date Result Priority   Mark Bhatti MD  [phone] 6/2/2024 Routine     Narrative & Impression  EXAMINATION:  NM MYOCARDIAL PERFUSION SPECT MULTI PHARM     CLINICAL HISTORY:  CAD screening, high CAD risk, not treadmill candidate;  Other forms of dyspnea     TECHNIQUE:  SPECT images in short, vertical and horizontal long axis were acquired  minutes after the injection of 11 mCi of Tc-99m sestamibi/tetrofosmin at rest and 34 mCi during a cardiac stress. The clinical stress and ECG portion of the study is to be read separately.     COMPARISON:  None.     FINDINGS:  The quality of the study is good     Impression:     1. Small, apical fixed perfusion defect consistent with scar or apical thinning artifact but not ischemia.  2. Normal left ventricular size with hyperdynamic left ventricular systolic function, ejection fraction 80%.         Electronically signed by:Mrak Bhatti  Date:                                            06/02/2024  Time:                                           17:26           Exam Ended: 05/31/24 10:45 CDT           Stress -        Lab Results   Component Value Date     12/21/2023    K 4.8 12/21/2023     12/21/2023    CO2 25 12/21/2023    BUN 32 (H) 12/21/2023    CREATININE 1.02 12/21/2023    CALCIUM 9.9 12/21/2023    ANIONGAP 13 12/21/2023    EGFRNONAA 75 03/29/2022       Lab Results   Component Value Date    CHOL 233 (H) 12/21/2023     Lab Results   Component Value Date    HDL 46 12/21/2023     Lab Results   Component Value Date    LDLCALC 124 12/21/2023     Lab Results   Component Value Date    TRIG 313 (H) 12/21/2023     Lab Results   Component Value Date    CHOLHDL 5.1 12/21/2023       Lab Results   Component Value Date    WBC 12.28 (H) 08/29/2022    HGB 10.6 (L) 08/29/2022    HCT 33.4 (L) 08/29/2022    MCV 86.5 08/29/2022     08/29/2022           Current Outpatient Medications:     calcium carbonate (OS-KUSH) 600 mg calcium (1,500 mg) Tab, Take 600 mg by mouth once daily., Disp: , Rfl:     cholecalciferol, vitamin D3, (VITAMIN D3) 10 mcg (400 unit) Tab, Take 400 Units by mouth once daily., Disp: , Rfl:     ciprofloxacin HCl (CIPRO) 500 MG tablet, Take one tablet by mouth every 12 hours (Patient not taking: Reported on 5/1/2024), Disp: , Rfl:     dexAMETHasone (DECADRON) 4 MG Tab, Take one tablet by mouth every day for three days, repeat as needed (Patient not taking: Reported on 5/1/2024), Disp: , Rfl:     diclofenac-misoprostol  mg-mcg (ARTHROTEC 75)  mg-mcg per tablet, Take 1 tablet by mouth 2 (two) times daily., Disp: , Rfl:     dulaglutide (TRULICITY) 1.5 mg/0.5 mL pen injector, Inject 1.5 mg into the skin every 7 days., Disp: 12 pen , Rfl: 1    empagliflozin (JARDIANCE) 25 mg tablet, Take 1 tablet (25 mg total) by mouth once daily., Disp: 90 tablet, Rfl: 1    estradioL (ESTRACE)  0.5 MG tablet, Take 0.5 mg by mouth once daily., Disp: , Rfl:     ezetimibe (ZETIA) 10 mg tablet, Take 1 tablet (10 mg total) by mouth every evening. Take one tablet by mouth daily, Disp: 90 tablet, Rfl: 1    gabapentin (NEURONTIN) 600 MG tablet, Take 1 tablet (600 mg total) by mouth 3 (three) times daily., Disp: 270 tablet, Rfl: 1    HYDROcodone-acetaminophen (NORCO) 7.5-325 mg per tablet, Take 1 tablet by mouth every 6 (six) hours as needed., Disp: 15 tablet, Rfl: 0    insulin (LANTUS SOLOSTAR U-100 INSULIN) glargine 100 units/mL SubQ pen, Inject 30 units sq once daily, Disp: 30 mL, Rfl: 1    isosorbide mononitrate (IMDUR) 30 MG 24 hr tablet, Take 1 tablet (30 mg total) by mouth once daily., Disp: 30 tablet, Rfl: 11    loratadine (CLARITIN) 10 mg tablet, Take 10 mg by mouth once daily., Disp: , Rfl:     meloxicam (MOBIC) 7.5 MG tablet, Take one tablet by mouth daily, Disp: , Rfl:     metFORMIN (GLUCOPHAGE) 1000 MG tablet, Take 1 tablet (1,000 mg total) by mouth 2 (two) times daily with meals., Disp: 180 tablet, Rfl: 3    methocarbamoL (ROBAXIN) 500 MG Tab, Take one tablet by mouth every 6 hours as needed, Disp: , Rfl:     mupirocin (BACTROBAN) 2 % ointment, Apply topically 3 (three) times daily. (Patient not taking: Reported on 12/21/2023), Disp: 45 g, Rfl: 0    Review of Systems   Constitutional: Negative for diaphoresis, malaise/fatigue, night sweats and weight gain.   HENT:  Negative for congestion, ear pain, hearing loss, nosebleeds and sore throat.    Eyes:  Negative for blurred vision, double vision, pain, photophobia and visual disturbance.        Bilateral cataract surgery   Cardiovascular:  Positive for dyspnea on exertion, leg swelling and orthopnea. Negative for chest pain, claudication, irregular heartbeat, near-syncope, palpitations and syncope.   Respiratory:  Negative for cough, shortness of breath, sleep disturbances due to breathing, snoring and wheezing.    Endocrine: Negative for cold  "intolerance, heat intolerance, polydipsia, polyphagia and polyuria.        DM x 30 years plus  Monitors blood sugars bid,  HbA1C   Hematologic/Lymphatic: Negative for bleeding problem. Does not bruise/bleed easily.   Skin:  Negative for dry skin, flushing, itching, rash and skin cancer.   Musculoskeletal:  Negative for arthritis, back pain, falls, joint pain, muscle cramps, muscle weakness and myalgias.   Gastrointestinal:  Negative for abdominal pain, change in bowel habit, constipation, diarrhea, dysphagia, heartburn, nausea and vomiting.   Genitourinary:  Negative for bladder incontinence, dysuria, flank pain, frequency and nocturia.   Neurological:  Negative for dizziness, focal weakness, headaches, light-headedness, loss of balance, numbness, paresthesias and seizures.   Psychiatric/Behavioral:  Negative for depression, memory loss and substance abuse. The patient is not nervous/anxious.    Allergic/Immunologic: Negative for environmental allergies.          Objective:   /62 (BP Location: Left arm)   Pulse 88   Ht 5' 7" (1.702 m)   Wt 69.4 kg (153 lb)   SpO2 96%   BMI 23.96 kg/m²       Physical Exam  Vitals and nursing note reviewed.   Constitutional:       Appearance: Normal appearance. She is obese.   HENT:      Head: Normocephalic and atraumatic.      Right Ear: External ear normal.      Left Ear: External ear normal.   Eyes:      General: No scleral icterus.        Right eye: No discharge.         Left eye: No discharge.      Extraocular Movements: Extraocular movements intact.      Conjunctiva/sclera: Conjunctivae normal.      Pupils: Pupils are equal, round, and reactive to light.   Cardiovascular:      Rate and Rhythm: Normal rate and regular rhythm.      Pulses: Normal pulses.      Heart sounds: Normal heart sounds. No murmur heard.     No friction rub. No gallop.   Pulmonary:      Effort: Pulmonary effort is normal.      Breath sounds: Normal breath sounds. No wheezing, rhonchi or rales. "   Chest:      Chest wall: No tenderness.   Abdominal:      General: Abdomen is flat. Bowel sounds are normal. There is no distension.      Palpations: Abdomen is soft.      Tenderness: There is no abdominal tenderness. There is no guarding or rebound.   Musculoskeletal:         General: No swelling or tenderness. Normal range of motion.      Cervical back: Normal range of motion and neck supple.      Right lower leg: Edema present.      Left lower leg: Edema present.   Skin:     General: Skin is warm and dry.      Findings: No erythema or rash.   Neurological:      General: No focal deficit present.      Mental Status: She is alert and oriented to person, place, and time.      Cranial Nerves: No cranial nerve deficit.      Motor: No weakness.      Gait: Gait normal.   Psychiatric:         Mood and Affect: Mood normal.         Behavior: Behavior normal.         Thought Content: Thought content normal.         Judgment: Judgment normal.       Assessment:     1. Dyspnea on exertion        2. Mixed hyperlipidemia        3. Pre-operative cardiovascular exam, new EKG abnormalities c/w ischemia              Plan:   Dyspnea on exertion,  now evidence ischemic substrate,will place zio to eval for arrhythmic tx.   Lower extremity edema: order echo to eval LV function, suspect CHF  PVD: previous amputation second toe on right foot, non-healing ulcer left great toe, ischemic rest pain at night  DM: not well controlled  5.   Hyperlilpidemia: unable to tolerate statins due to muscle cramps.   6.   Abnormal EKG. Normal stress, no significant ischemia, old fixed scar inferiorly, likely artifact.

## 2024-07-22 ENCOUNTER — TELEPHONE (OUTPATIENT)
Dept: GASTROENTEROLOGY | Facility: CLINIC | Age: 74
End: 2024-07-22
Payer: MEDICARE

## 2024-07-23 DIAGNOSIS — Z12.11 ENCOUNTER FOR SCREENING COLONOSCOPY: Primary | ICD-10-CM

## 2024-08-01 ENCOUNTER — PATIENT MESSAGE (OUTPATIENT)
Dept: DIABETES SERVICES | Facility: CLINIC | Age: 74
End: 2024-08-01
Payer: MEDICARE

## 2024-08-05 PROBLEM — Z13.6 ENCOUNTER FOR SCREENING FOR CARDIOVASCULAR DISORDERS: Status: RESOLVED | Noted: 2024-05-01 | Resolved: 2024-08-05

## 2024-08-06 ENCOUNTER — OFFICE VISIT (OUTPATIENT)
Dept: CARDIOLOGY | Facility: CLINIC | Age: 74
End: 2024-08-06
Payer: MEDICARE

## 2024-08-06 VITALS
HEIGHT: 67 IN | SYSTOLIC BLOOD PRESSURE: 104 MMHG | BODY MASS INDEX: 22.91 KG/M2 | DIASTOLIC BLOOD PRESSURE: 60 MMHG | HEART RATE: 60 BPM | WEIGHT: 146 LBS

## 2024-08-06 DIAGNOSIS — R06.09 DYSPNEA ON EXERTION: Primary | ICD-10-CM

## 2024-08-06 DIAGNOSIS — E11.51 TYPE 2 DIABETES MELLITUS WITH DIABETIC PERIPHERAL ANGIOPATHY WITHOUT GANGRENE, WITHOUT LONG-TERM CURRENT USE OF INSULIN: ICD-10-CM

## 2024-08-06 PROCEDURE — 99213 OFFICE O/P EST LOW 20 MIN: CPT | Mod: S$PBB,,, | Performed by: INTERNAL MEDICINE

## 2024-08-06 PROCEDURE — 99214 OFFICE O/P EST MOD 30 MIN: CPT | Mod: PBBFAC | Performed by: INTERNAL MEDICINE

## 2024-08-06 PROCEDURE — 99999 PR PBB SHADOW E&M-EST. PATIENT-LVL IV: CPT | Mod: PBBFAC,,, | Performed by: INTERNAL MEDICINE

## 2024-08-06 NOTE — PROGRESS NOTES
PCP: Alex Babcock MD    Referring Provider:     Subjective:   Nicol Reese is a 74 y.o. female with hx of type II DM and hyperlipidemia who presents for consult to review Zio monitor results.     She was previously evaluated for shortness of breath. She reports that it has mildly improved but that she is still experiencing it with minimal activity and improves with rest. She denies associated chest pain, pressure or palpitations. She was also experiencing lower extremity swelling. She has decreased blood flow in the right leg and is following with the vein center for further evaluation and treatment.     The most activity that she is currently getting is working around the house.    Fhx:   Family History   Problem Relation Name Age of Onset    Cancer Mother      Alzheimer's disease Father      Cancer Sister      Breast cancer Sister      Arthritis Sister      Diabetes Sister      Thyroid disease Sister      Diabetes Sister      Arthritis Sister      Diabetes Sister      Diabetes Brother      Dementia Brother      Diabetes Brother      Diabetes Brother      Seizures Brother      No Known Problems Brother      Diabetes Brother      Hypertension Brother        Shx:   Past Surgical History:   Procedure Laterality Date    DEBRIDEMENT OF LOWER EXTREMITY Left 03/28/2022    Procedure: DEBRIDEMENT, LOWER EXTREMITY;  Surgeon: Kobi Jacome DO;  Location: [facility] OR;  Service: General;  Laterality: Left;    HYSTERECTOMY      OOPHORECTOMY      TOE AMPUTATION Left 03/28/2022    Procedure: AMPUTATION, TOE;  Surgeon: Kobi Jacome DO;  Location: [facility] OR;  Service: General;  Laterality: Left;  possible left 2nd toe amp        EKG - .       Predominant NSR  Rare PACs, PVCs  Short runs of asymptomatic SVT  Short run of NSVT, 6 beats  No significant pauses     Pt triggered event with NSR.     ECHO - Results for orders placed during the hospital encounter of 05/31/24    Echo    Interpretation Summary    Left  Ventricle: The left ventricle is normal in size. Increased wall thickness. Mild septal thickening. There is normal systolic function with a visually estimated ejection fraction of 65 - 70%. There is normal diastolic function.    Right Ventricle: Normal right ventricular cavity size. Systolic function is normal.    Left Atrium: Left atrium is dilated.    Aortic Valve: The aortic valve is a trileaflet valve. There is aortic valve sclerosis.    Mitral Valve: Mildly calcified leaflets. There is mild mitral annular calcification present. There is moderate regurgitation.    Tricuspid Valve: There is moderate regurgitation.    IVC/SVC: Normal venous pressure at 3 mmHg.    Pericardium: Left pleural effusion.       CATH - No results found for this or any previous visit.       Stress - Results for orders placed during the hospital encounter of 05/31/24    Nuclear Stress Test    Interpretation Summary    The ECG portion of the study is negative for ischemia.    The patient reported no chest pain during the stress test.    During stress, rare PVCs are noted.       Lab Results   Component Value Date     12/21/2023    K 4.8 12/21/2023     12/21/2023    CO2 25 12/21/2023    BUN 32 (H) 12/21/2023    CREATININE 1.02 12/21/2023    CALCIUM 9.9 12/21/2023    ANIONGAP 13 12/21/2023    EGFRNONAA 75 03/29/2022       Lab Results   Component Value Date    CHOL 233 (H) 12/21/2023     Lab Results   Component Value Date    HDL 46 12/21/2023     Lab Results   Component Value Date    LDLCALC 124 12/21/2023     Lab Results   Component Value Date    TRIG 313 (H) 12/21/2023     Lab Results   Component Value Date    CHOLHDL 5.1 12/21/2023       Lab Results   Component Value Date    WBC 12.28 (H) 08/29/2022    HGB 10.6 (L) 08/29/2022    HCT 33.4 (L) 08/29/2022    MCV 86.5 08/29/2022     08/29/2022           Current Outpatient Medications:     calcium carbonate (OS-KUSH) 600 mg calcium (1,500 mg) Tab, Take 600 mg by mouth once  daily., Disp: , Rfl:     cholecalciferol, vitamin D3, (VITAMIN D3) 10 mcg (400 unit) Tab, Take 400 Units by mouth once daily., Disp: , Rfl:     ciprofloxacin HCl (CIPRO) 500 MG tablet, Take one tablet by mouth every 12 hours (Patient not taking: Reported on 5/1/2024), Disp: , Rfl:     dexAMETHasone (DECADRON) 4 MG Tab, Take one tablet by mouth every day for three days, repeat as needed (Patient not taking: Reported on 5/1/2024), Disp: , Rfl:     diclofenac-misoprostol  mg-mcg (ARTHROTEC 75)  mg-mcg per tablet, Take 1 tablet by mouth 2 (two) times daily., Disp: , Rfl:     dulaglutide (TRULICITY) 1.5 mg/0.5 mL pen injector, Inject 1.5 mg into the skin every 7 days., Disp: 12 pen , Rfl: 1    empagliflozin (JARDIANCE) 25 mg tablet, Take 1 tablet (25 mg total) by mouth once daily., Disp: 90 tablet, Rfl: 1    estradioL (ESTRACE) 0.5 MG tablet, Take 0.5 mg by mouth once daily., Disp: , Rfl:     ezetimibe (ZETIA) 10 mg tablet, Take 1 tablet (10 mg total) by mouth every evening. Take one tablet by mouth daily, Disp: 90 tablet, Rfl: 1    gabapentin (NEURONTIN) 600 MG tablet, Take 1 tablet (600 mg total) by mouth 3 (three) times daily., Disp: 270 tablet, Rfl: 1    HYDROcodone-acetaminophen (NORCO) 7.5-325 mg per tablet, Take 1 tablet by mouth every 6 (six) hours as needed., Disp: 15 tablet, Rfl: 0    insulin (LANTUS SOLOSTAR U-100 INSULIN) glargine 100 units/mL SubQ pen, Inject 30 units sq once daily, Disp: 30 mL, Rfl: 1    isosorbide mononitrate (IMDUR) 30 MG 24 hr tablet, Take 1 tablet (30 mg total) by mouth once daily., Disp: 30 tablet, Rfl: 11    loratadine (CLARITIN) 10 mg tablet, Take 10 mg by mouth once daily., Disp: , Rfl:     meloxicam (MOBIC) 7.5 MG tablet, Take one tablet by mouth daily, Disp: , Rfl:     metFORMIN (GLUCOPHAGE) 1000 MG tablet, Take 1 tablet (1,000 mg total) by mouth 2 (two) times daily with meals., Disp: 180 tablet, Rfl: 3    methocarbamoL (ROBAXIN) 500 MG Tab, Take one tablet by mouth  "every 6 hours as needed, Disp: , Rfl:     mupirocin (BACTROBAN) 2 % ointment, Apply topically 3 (three) times daily. (Patient not taking: Reported on 12/21/2023), Disp: 45 g, Rfl: 0    Review of Systems   Constitutional: Negative for chills and fever.        Weight fluctuates.    HENT:  Positive for congestion. Negative for ear pain and sore throat.         Congestion and rhinorrhea secondary to allergies - controlled with antihistamines.    Eyes:  Negative for blurred vision and pain.   Cardiovascular:  Positive for leg swelling. Negative for chest pain and palpitations.   Respiratory:  Positive for shortness of breath. Negative for cough.    Skin:  Positive for itching. Negative for dry skin and rash.        Lower extremity itching from poor blood flow - following with vein center.    Musculoskeletal:  Positive for arthritis and joint pain.   Gastrointestinal:  Negative for abdominal pain, constipation, diarrhea, nausea and vomiting.   Genitourinary:  Negative for dysuria, frequency and hematuria.   Neurological:  Negative for dizziness, numbness and paresthesias.   Psychiatric/Behavioral:  Negative for depression. The patient is not nervous/anxious.           Objective:   /60 (BP Location: Left arm, Patient Position: Sitting)   Pulse 60   Ht 5' 7" (1.702 m)   Wt 66.2 kg (146 lb)   BMI 22.87 kg/m²       Physical Exam  Constitutional:       Appearance: Normal appearance. She is normal weight.   HENT:      Head: Normocephalic and atraumatic.      Nose: Nose normal.      Mouth/Throat:      Mouth: Mucous membranes are moist.   Eyes:      Pupils: Pupils are equal, round, and reactive to light.   Cardiovascular:      Rate and Rhythm: Regular rhythm. Tachycardia present.      Pulses: Normal pulses.      Heart sounds: Normal heart sounds.   Pulmonary:      Effort: Pulmonary effort is normal.      Breath sounds: Normal breath sounds.   Abdominal:      General: Abdomen is flat.      Palpations: Abdomen is soft. "   Musculoskeletal:         General: Normal range of motion.      Cervical back: Normal range of motion.      Right lower leg: Edema present.      Left lower leg: Edema present.   Skin:     General: Skin is warm and dry.      Comments: Bilateral leg skin discoloration.    Neurological:      General: No focal deficit present.      Mental Status: She is alert and oriented to person, place, and time.   Psychiatric:         Mood and Affect: Mood normal.         Thought Content: Thought content normal.         Judgment: Judgment normal.         Assessment:   No diagnosis found.      Plan:   No problem-specific Assessment & Plan notes found for this encounter.

## 2024-12-04 ENCOUNTER — HOSPITAL ENCOUNTER (OUTPATIENT)
Dept: GASTROENTEROLOGY | Facility: HOSPITAL | Age: 74
Discharge: HOME | End: 2024-12-04
Attending: INTERNAL MEDICINE | Admitting: INTERNAL MEDICINE
Payer: MEDICARE

## 2024-12-04 ENCOUNTER — ANESTHESIA EVENT (OUTPATIENT)
Dept: GASTROENTEROLOGY | Facility: HOSPITAL | Age: 74
End: 2024-12-04
Payer: MEDICARE

## 2024-12-04 ENCOUNTER — ANESTHESIA (OUTPATIENT)
Dept: GASTROENTEROLOGY | Facility: HOSPITAL | Age: 74
End: 2024-12-04
Payer: MEDICARE

## 2024-12-04 VITALS
TEMPERATURE: 97 F | HEART RATE: 69 BPM | DIASTOLIC BLOOD PRESSURE: 56 MMHG | BODY MASS INDEX: 22.5 KG/M2 | HEIGHT: 66 IN | RESPIRATION RATE: 9 BRPM | OXYGEN SATURATION: 98 % | WEIGHT: 140 LBS | SYSTOLIC BLOOD PRESSURE: 138 MMHG

## 2024-12-04 DIAGNOSIS — Z86.0100 HISTORY OF COLON POLYPS: Primary | ICD-10-CM

## 2024-12-04 DIAGNOSIS — K57.30 COLON, DIVERTICULOSIS: ICD-10-CM

## 2024-12-04 DIAGNOSIS — Z12.11 ENCOUNTER FOR SCREENING COLONOSCOPY: ICD-10-CM

## 2024-12-04 DIAGNOSIS — K63.5 POLYP OF ASCENDING COLON, UNSPECIFIED TYPE: ICD-10-CM

## 2024-12-04 LAB — GLUCOSE SERPL-MCNC: 94 MG/DL (ref 70–105)

## 2024-12-04 PROCEDURE — 27000284 HC CANNULA NASAL: Performed by: NURSE ANESTHETIST, CERTIFIED REGISTERED

## 2024-12-04 PROCEDURE — 27201423 OPTIME MED/SURG SUP & DEVICES STERILE SUPPLY

## 2024-12-04 PROCEDURE — 82962 GLUCOSE BLOOD TEST: CPT

## 2024-12-04 PROCEDURE — 45380 COLONOSCOPY AND BIOPSY: CPT | Mod: PT,,, | Performed by: INTERNAL MEDICINE

## 2024-12-04 PROCEDURE — 88305 TISSUE EXAM BY PATHOLOGIST: CPT | Mod: 26,,, | Performed by: PATHOLOGY

## 2024-12-04 PROCEDURE — 88305 TISSUE EXAM BY PATHOLOGIST: CPT | Mod: TC,SUR | Performed by: INTERNAL MEDICINE

## 2024-12-04 PROCEDURE — 63600175 PHARM REV CODE 636 W HCPCS: Performed by: NURSE ANESTHETIST, CERTIFIED REGISTERED

## 2024-12-04 PROCEDURE — 37000009 HC ANESTHESIA EA ADD 15 MINS

## 2024-12-04 PROCEDURE — 45380 COLONOSCOPY AND BIOPSY: CPT | Mod: PT | Performed by: INTERNAL MEDICINE

## 2024-12-04 PROCEDURE — 37000008 HC ANESTHESIA 1ST 15 MINUTES

## 2024-12-04 RX ORDER — PROPOFOL 10 MG/ML
VIAL (ML) INTRAVENOUS
Status: DISCONTINUED | OUTPATIENT
Start: 2024-12-04 | End: 2024-12-04

## 2024-12-04 RX ORDER — SODIUM CHLORIDE, SODIUM LACTATE, POTASSIUM CHLORIDE, CALCIUM CHLORIDE 600; 310; 30; 20 MG/100ML; MG/100ML; MG/100ML; MG/100ML
INJECTION, SOLUTION INTRAVENOUS CONTINUOUS
Status: DISCONTINUED | OUTPATIENT
Start: 2024-12-04 | End: 2024-12-05 | Stop reason: HOSPADM

## 2024-12-04 RX ORDER — SODIUM CHLORIDE 0.9 % (FLUSH) 0.9 %
10 SYRINGE (ML) INJECTION EVERY 6 HOURS PRN
Status: DISCONTINUED | OUTPATIENT
Start: 2024-12-04 | End: 2024-12-05 | Stop reason: HOSPADM

## 2024-12-04 RX ORDER — LIDOCAINE HYDROCHLORIDE 20 MG/ML
INJECTION, SOLUTION EPIDURAL; INFILTRATION; INTRACAUDAL; PERINEURAL
Status: DISCONTINUED | OUTPATIENT
Start: 2024-12-04 | End: 2024-12-04

## 2024-12-04 RX ADMIN — PROPOFOL 50 MG: 10 INJECTION, EMULSION INTRAVENOUS at 10:12

## 2024-12-04 RX ADMIN — LIDOCAINE HYDROCHLORIDE 100 MG: 20 INJECTION, SOLUTION INTRAVENOUS at 10:12

## 2024-12-04 NOTE — ANESTHESIA PREPROCEDURE EVALUATION
12/04/2024  Nicol Reese is a 74 y.o., female.    Social History     Socioeconomic History    Marital status:    Tobacco Use    Smoking status: Never    Smokeless tobacco: Never   Substance and Sexual Activity    Alcohol use: Not Currently    Drug use: Not Currently    Sexual activity: Not Currently     Social Drivers of Health     Financial Resource Strain: Medium Risk (5/30/2024)    Overall Financial Resource Strain (CARDIA)     Difficulty of Paying Living Expenses: Somewhat hard   Food Insecurity: No Food Insecurity (5/30/2024)    Hunger Vital Sign     Worried About Running Out of Food in the Last Year: Never true     Ran Out of Food in the Last Year: Never true   Transportation Needs: No Transportation Needs (4/30/2024)    PRAPARE - Transportation     Lack of Transportation (Medical): No     Lack of Transportation (Non-Medical): No   Physical Activity: Inactive (5/30/2024)    Exercise Vital Sign     Days of Exercise per Week: 0 days     Minutes of Exercise per Session: 0 min   Stress: No Stress Concern Present (5/30/2024)    [employer] - Occupational Stress Questionnaire     Feeling of Stress : Not at all   Recent Concern: Stress - Stress Concern Present (4/30/2024)    [employer] - Occupational Stress Questionnaire     Feeling of Stress : To some extent   Housing Stability: Unknown (5/30/2024)    Housing Stability Vital Sign     Unable to Pay for Housing in the Last Year: No   Recent Concern: Housing Stability - High Risk (4/30/2024)    Housing Stability Vital Sign     Unable to Pay for Housing in the Last Year: Yes      Pre-op Assessment    I have reviewed the Patient Summary Reports.     I have reviewed the Nursing Notes. I have reviewed the NPO Status.   I have reviewed the Medications.     Review of Systems  Anesthesia Hx:  No  problems with previous Anesthesia                Social:  Non-Smoker       Pulmonary:      Shortness of breath                  Endocrine:  Diabetes             Past Medical History:   Diagnosis Date    Diabetes mellitus, type 2     Hyperlipidemia     Neuropathy     Rheumatoid arthritis, unspecified       Past Surgical History:   Procedure Laterality Date    DEBRIDEMENT OF LOWER EXTREMITY Left 03/28/2022    Procedure: DEBRIDEMENT, LOWER EXTREMITY;  Surgeon: Kobi Jacome DO;  Location: [facility] OR;  Service: General;  Laterality: Left;    HYSTERECTOMY      OOPHORECTOMY      TOE AMPUTATION Left 03/28/2022    Procedure: AMPUTATION, TOE;  Surgeon: Kobi Jacome DO;  Location: [facility] OR;  Service: General;  Laterality: Left;  possible left 2nd toe amp        Physical Exam  General: Well nourished, Cooperative, Alert and Oriented    Airway:  Mallampati: II     Chest/Lungs:  Clear to auscultation    Heart:  Rate: Normal        Anesthesia Plan  Type of Anesthesia, risks & benefits discussed:    Anesthesia Type: Gen Natural Airway, MAC  Intra-op Monitoring Plan: Standard ASA Monitors  Post Op Pain Control Plan: multimodal analgesia and IV/PO Opioids PRN  Induction:  IV  Informed Consent: Informed consent signed with the Patient and all parties understand the risks and agree with anesthesia plan.  All questions answered. Patient consented to blood products? Yes  ASA Score: 2  Day of Surgery Review of History & Physical: I have interviewed and examined the patient. I have reviewed the patient's H&P dated: There are no significant changes.     Ready For Surgery From Anesthesia Perspective.     .

## 2024-12-04 NOTE — DISCHARGE INSTRUCTIONS
Procedure Date  12/4/24     Impression  Overall Impression:   Subcentimeter polyp in the ascending colon; performed cold forceps biopsy  Diverticulosis in the descending colon and sigmoid colon  Digital rectal exam revealed no mass.  The colon was examined from the rectum to the cecum and diverticula were present in the descending and sigmoid colon.  A diminutive polyp was removed with biopsy from the ascending colon.  Impression:  Screening for colon neoplasm, history of colon polyps, colon diverticulosis, 1 polyp was removed during this exam.  Recommendation  Await pathology results, due: 12/6/2024   Repeat colonoscopy in 5 years      Disposition:  Discharge patient to home in stable condition.  High-fiber diet.  No driving until tomorrow.  Follow up with PCP as scheduled.  Follow up pathology results in 48 hours.  Return to GI clinic p.r.n..  Consider repeat screening exam in 5 years if patient is in good health at that time.    No driving today, no operating heavy machinery, no signing any legal documents until tomorrow.    Drink lots of fluids, resume regular diet.  Take your normal medications.     Please call our office for any nausea, vomiting or abdominal pain.

## 2024-12-04 NOTE — H&P
[facility] - Endoscopy  Gastroenterology  H&P    Patient Name: Nicol Reese  MRN: [id number]  Admission Date: 12/4/2024  Code Status: Prior    Attending Provider: Doyle Krause MD   Primary Care Physician: Alex Babcock MD  Principal Problem:<principal problem not specified>    Subjective:     History of Present Illness:  This patient is a 74-year-old female who presents for screening colonoscopy.  No prior colonoscopy report is available on this record.    Past Medical History:   Diagnosis Date    Diabetes mellitus, type 2     Hyperlipidemia     Neuropathy     Rheumatoid arthritis, unspecified        Past Surgical History:   Procedure Laterality Date    DEBRIDEMENT OF LOWER EXTREMITY Left 03/28/2022    Procedure: DEBRIDEMENT, LOWER EXTREMITY;  Surgeon: Kobi Jacome DO;  Location: [facility] OR;  Service: General;  Laterality: Left;    HYSTERECTOMY      OOPHORECTOMY      TOE AMPUTATION Left 03/28/2022    Procedure: AMPUTATION, TOE;  Surgeon: Kobi Jacome DO;  Location: [facility] OR;  Service: General;  Laterality: Left;  possible left 2nd toe amp       Review of patient's allergies indicates:   Allergen Reactions    Augmentin [amoxicillin-pot clavulanate] Itching and Rash     Family History       Problem Relation (Age of Onset)    Alzheimer's disease Father    Arthritis Sister, Sister    Breast cancer Sister    Cancer Mother, Sister    Dementia Brother    Diabetes Sister, Sister, Sister, Brother, Brother, Brother, Brother    Hypertension Brother    No Known Problems Brother    Seizures Brother    Thyroid disease Sister          Tobacco Use    Smoking status: Never    Smokeless tobacco: Never   Substance and Sexual Activity    Alcohol use: Not Currently    Drug use: Not Currently    Sexual activity: Not Currently     Review of Systems   Respiratory: Negative.     Cardiovascular: Negative.    Gastrointestinal: Negative.    Musculoskeletal:  Positive for arthralgias and gait problem.     Objective:  "    Vital Signs (Most Recent):  Pulse: 88 (12/04/24 1009)  Resp: 13 (12/04/24 1009)  BP: (!) 133/56 (12/04/24 1009)  SpO2: 97 % (12/04/24 1009) Vital Signs (24h Range):  Pulse:  [88] 88  Resp:  [13] 13  SpO2:  [97 %] 97 %  BP: (133)/(56) 133/56     Weight: 63.5 kg (140 lb) (12/04/24 1009)  Body mass index is 22.6 kg/m².    No intake or output data in the 24 hours ending 12/04/24 1026    Lines/Drains/Airways       Peripheral Intravenous Line  Duration                  Peripheral IV - Single Lumen 12/04/24 1015 22 G Left Wrist <1 day                    Physical Exam  Vitals reviewed.   Constitutional:       General: She is not in acute distress.     Appearance: Normal appearance. She is well-developed. She is not ill-appearing.   HENT:      Head: Normocephalic and atraumatic.      Nose: Nose normal.   Eyes:      Pupils: Pupils are equal, round, and reactive to light.   Cardiovascular:      Rate and Rhythm: Normal rate and regular rhythm.   Pulmonary:      Effort: Pulmonary effort is normal.      Breath sounds: Normal breath sounds. No wheezing.   Abdominal:      General: Abdomen is flat. Bowel sounds are normal. There is no distension.      Palpations: Abdomen is soft.      Tenderness: There is no abdominal tenderness. There is no guarding.   Skin:     General: Skin is warm and dry.      Coloration: Skin is not jaundiced.   Neurological:      Mental Status: She is alert.   Psychiatric:         Attention and Perception: Attention normal.         Mood and Affect: Affect normal.         Speech: Speech normal.         Behavior: Behavior is cooperative.      Comments: Pt was calm while speaking.         Significant Labs:  CBC: No results for input(s): "WBC", "HGB", "HCT", "PLT" in the last 48 hours.  CMP: No results for input(s): "GLU", "CALCIUM", "ALBUMIN", "PROT", "NA", "K", "CO2", "CL", "BUN", "CREATININE", "ALKPHOS", "ALT", "AST", "BILITOT" in the last 48 hours.    Significant Imaging:  Imaging results within the past " 24 hours have been reviewed.    Assessment/Plan:     There are no hospital problems to display for this patient.        Impression:  Screening for colon neoplasm  Plan:  Colonoscopy today    Doyle Krause MD  Gastroenterology  [facility] End[facility]

## 2024-12-04 NOTE — TRANSFER OF CARE
"Anesthesia Transfer of Care Note    Patient: Nicol Reese    Procedure(s) Performed: * No procedures listed *    Patient location: GI    Anesthesia Type: general    Transport from OR: Transported from OR on room air with adequate spontaneous ventilation    Post pain: adequate analgesia    Post assessment: no apparent anesthetic complications    Post vital signs: stable    Level of consciousness: responds to stimulation    Nausea/Vomiting: no nausea/vomiting    Complications: none    Transfer of care protocol was followed      Last vitals: Visit Vitals  BP (!) 103/45 (BP Location: Left arm, Patient Position: Lying)   Pulse 92   Temp 36.1 °C (97 °F) (Oral)   Resp (!) 25   Ht 5' 6" (1.676 m)   Wt 63.5 kg (140 lb)   SpO2 98%   Breastfeeding No   BMI 22.60 kg/m²     "

## 2024-12-04 NOTE — ANESTHESIA POSTPROCEDURE EVALUATION
Anesthesia Post Evaluation    Patient: Nicol Reese    Procedure(s) Performed: * No procedures listed *    Final Anesthesia Type: general      Patient location during evaluation: GI PACU  Patient participation: Yes- Able to Participate  Level of consciousness: responds to stimulation  Post-procedure vital signs: reviewed and stable  Pain management: adequate  Airway patency: patent  LORETO mitigation strategies: Multimodal analgesia  PONV status at discharge: No PONV  Anesthetic complications: no      Cardiovascular status: hemodynamically stable  Respiratory status: unassisted, spontaneous ventilation and room air  Hydration status: euvolemic  Follow-up not needed.  Comments: The pt was not arousable even with painful stimulation during the procedure.   Refer to nursing note for pain/aranza score upon discharge from recovery.               Vitals Value Taken Time   /56 12/04/24 1131   Temp 36.1 °C (97 °F) 12/04/24 1106   Pulse 70 12/04/24 1133   Resp 13 12/04/24 1133   SpO2 83 % 12/04/24 1113   Vitals shown include unfiled device data.      Event Time   Out of Recovery 12:00:00         Pain/Aranza Score: Aranza Score: 9 (12/4/2024 10:59 AM)

## 2024-12-06 ENCOUNTER — TELEPHONE (OUTPATIENT)
Dept: GASTROENTEROLOGY | Facility: CLINIC | Age: 74
End: 2024-12-06
Payer: MEDICARE

## 2024-12-06 NOTE — TELEPHONE ENCOUNTER
Results and recommendations called to patient. Verbalized understanding.          ----- Message from Doyle Krause MD sent at 12/5/2024 10:26 AM CST -----  The colon polyp was a tubular adenoma.  Recommendation:  Consider repeat colonoscopy in 5 years.

## 2025-05-07 ENCOUNTER — OFFICE VISIT (OUTPATIENT)
Dept: ORTHOPEDICS | Facility: CLINIC | Age: 75
End: 2025-05-07
Payer: MEDICARE

## 2025-05-07 ENCOUNTER — HOSPITAL ENCOUNTER (OUTPATIENT)
Dept: RADIOLOGY | Facility: HOSPITAL | Age: 75
Discharge: HOME | End: 2025-05-07
Attending: ORTHOPAEDIC SURGERY
Payer: MEDICARE

## 2025-05-07 VITALS
WEIGHT: 133 LBS | SYSTOLIC BLOOD PRESSURE: 137 MMHG | DIASTOLIC BLOOD PRESSURE: 62 MMHG | HEIGHT: 66 IN | BODY MASS INDEX: 21.38 KG/M2

## 2025-05-07 DIAGNOSIS — M25.561 RIGHT KNEE PAIN, UNSPECIFIED CHRONICITY: ICD-10-CM

## 2025-05-07 DIAGNOSIS — M25.561 RIGHT KNEE PAIN, UNSPECIFIED CHRONICITY: Primary | ICD-10-CM

## 2025-05-07 DIAGNOSIS — M17.11 ARTHRITIS OF RIGHT KNEE: ICD-10-CM

## 2025-05-07 DIAGNOSIS — Z01.812 PRE-OPERATIVE LABORATORY EXAMINATION: ICD-10-CM

## 2025-05-07 PROCEDURE — 99999 PR PBB SHADOW E&M-EST. PATIENT-LVL IV: CPT | Mod: PBBFAC,,, | Performed by: ORTHOPAEDIC SURGERY

## 2025-05-07 PROCEDURE — 99204 OFFICE O/P NEW MOD 45 MIN: CPT | Mod: S$PBB,,, | Performed by: ORTHOPAEDIC SURGERY

## 2025-05-07 PROCEDURE — 73564 X-RAY EXAM KNEE 4 OR MORE: CPT | Mod: TC,RT

## 2025-05-07 PROCEDURE — 99214 OFFICE O/P EST MOD 30 MIN: CPT | Mod: PBBFAC,25 | Performed by: ORTHOPAEDIC SURGERY

## 2025-05-07 NOTE — PROGRESS NOTES
Radiology Interpretation        Patient Name: Nicol Reese  Date: 5/7/2025  YOB: 1950  MRN# [id number]        ORDERING DIAGNOSIS:    Encounter Diagnoses   Name Primary?    Right knee pain, unspecified chronicity Yes    Arthritis of right knee            Four views right knee skeletally mature individual there were tricompartmental degenerative changes with valgus alignment bone-on-bone lateral compartment impression severe degenerative changes right knee            Juan Richards MD

## 2025-05-07 NOTE — PROGRESS NOTES
Clinic Note        CC:   Chief Complaint   Patient presents with    Right Knee - Pain        Principal problem: Right knee pain, unspecified chronicity [M25.561]     REASON FOR VISIT:       HISTORY:       PAST MEDICAL HISTORY:   Past Medical History:   Diagnosis Date    Diabetes mellitus, type 2     Hyperlipidemia     Neuropathy     Rheumatoid arthritis, unspecified           PAST SURGICAL HISTORY:   Past Surgical History:   Procedure Laterality Date    DEBRIDEMENT OF LOWER EXTREMITY Left 03/28/2022    Procedure: DEBRIDEMENT, LOWER EXTREMITY;  Surgeon: Kobi Jacome DO;  Location: [facility];  Service: General;  Laterality: Left;    HYSTERECTOMY      OOPHORECTOMY      TOE AMPUTATION Left 03/28/2022    Procedure: AMPUTATION, TOE;  Surgeon: Kobi Jacome DO;  Location: [facility];  Service: General;  Laterality: Left;  possible left 2nd toe amp          ALLERGIES:   Review of patient's allergies indicates:   Allergen Reactions    Augmentin [amoxicillin-pot clavulanate] Itching and Rash        MEDICATIONS :  Current Medications[1]     SOCIAL HISTORY: Social History[2]       FAMILY HISTORY:   Family History   Problem Relation Name Age of Onset    Cancer Mother      Alzheimer's disease Father      Cancer Sister      Breast cancer Sister      Arthritis Sister      Diabetes Sister      Thyroid disease Sister      Diabetes Sister      Arthritis Sister      Diabetes Sister      Diabetes Brother      Dementia Brother      Diabetes Brother      Diabetes Brother      Seizures Brother      No Known Problems Brother      Diabetes Brother      Hypertension Brother            PHYSICAL EXAM:  In general, this is a well-developed, well-nourished female . The patient is alert, oriented and cooperative.      HEENT:  Normocephalic, atraumatic.  Extraocular movements are intact bilaterally.  The oropharynx is benign.       NECK:  Nontender with good range of motion.      LUNGS:  Clear to auscultation  bilaterally.      HEART:  Demonstrates a regular rate and rhythm.  No murmurs appreciated.      ABDOMEN:  Soft, non-tender, non-distended.        EXTREMITIES:       RADIOGRAPHIC FINDINGS:       IMPRESSION: Right knee pain, unspecified chronicity  -     X-Ray Knee Complete 4 Or More Views Right; Future; Expected date: 05/07/2025    Arthritis of right knee         PLAN: There are no Patient Instructions on file for this visit.      No follow-ups on file.         Juan Richards      (Subject to voice recognition error, transcription service not allowed)              Clinic Note        CC:   Chief Complaint   Patient presents with    Right Knee - Pain        Principal problem: Right knee pain, unspecified chronicity [M25.561]     REASON FOR VISIT:       HISTORY:  74-year-old female complaining of right knee greater than left.  She has severe degenerative changes with bone-on-bone lateral compartment valgus alignment.  She has had removal of a toe from right foot she does have palpable pulses sensation in her foot.  There were no lesions currently.      PAST MEDICAL HISTORY:   Past Medical History:   Diagnosis Date    Diabetes mellitus, type 2     Hyperlipidemia     Neuropathy     Rheumatoid arthritis, unspecified           PAST SURGICAL HISTORY:   Past Surgical History:   Procedure Laterality Date    DEBRIDEMENT OF LOWER EXTREMITY Left 03/28/2022    Procedure: DEBRIDEMENT, LOWER EXTREMITY;  Surgeon: Kobi Jacome DO;  Location: [facility] OR;  Service: General;  Laterality: Left;    HYSTERECTOMY      OOPHORECTOMY      TOE AMPUTATION Left 03/28/2022    Procedure: AMPUTATION, TOE;  Surgeon: Kobi Jacome DO;  Location: [facility] OR;  Service: General;  Laterality: Left;  possible left 2nd toe amp          ALLERGIES:   Review of patient's allergies indicates:   Allergen Reactions    Augmentin [amoxicillin-pot clavulanate] Itching and Rash        MEDICATIONS :  Current Medications[3]     SOCIAL HISTORY: Social  History[4]       FAMILY HISTORY:   Family History   Problem Relation Name Age of Onset    Cancer Mother      Alzheimer's disease Father      Cancer Sister      Breast cancer Sister      Arthritis Sister      Diabetes Sister      Thyroid disease Sister      Diabetes Sister      Arthritis Sister      Diabetes Sister      Diabetes Brother      Dementia Brother      Diabetes Brother      Diabetes Brother      Seizures Brother      No Known Problems Brother      Diabetes Brother      Hypertension Brother            PHYSICAL EXAM:  In general, this is a well-developed, well-nourished female . The patient is alert, oriented and cooperative.      HEENT:  Normocephalic, atraumatic.  Extraocular movements are intact bilaterally.  The oropharynx is benign.       NECK:  Nontender with good range of motion.      LUNGS:  Clear to auscultation bilaterally.      HEART:  Demonstrates a regular rate and rhythm.  No murmurs appreciated.      ABDOMEN:  Soft, non-tender, non-distended.        EXTREMITIES:  Right lower extremity she does move her toes she does have sensation of the foot she has a palpable dorsalis pedis pulse she has range motion 5-90 degrees on the right knee.  She has crepitus on range motion.  Valgus alignment.  No instability in the knee noted 1+ effusion.  Walks with a then count antalgic gait using a cane.      RADIOGRAPHIC FINDINGS:  X-rays show severe tricompartmental degenerative changes right knee      IMPRESSION: Right knee pain, unspecified chronicity  -     X-Ray Knee Complete 4 Or More Views Right; Future; Expected date: 05/07/2025    Arthritis of right knee         PLAN:  This time we discussed treatment options including risks and benefits of surgery she wished to proceed with a total knee arthroplasty on right we are going to set this up in the near future neurovascularly she does have palpable pulses set her up for the total knee on the right  There are no Patient Instructions on file for this  visit.      No follow-ups on file.         Juan Richards      (Subject to voice recognition error, transcription service not allowed)           [1]   Current Outpatient Medications:     calcium carbonate (OS-KUSH) 600 mg calcium (1,500 mg) Tab, Take 600 mg by mouth once daily., Disp: , Rfl:     cholecalciferol, vitamin D3, (VITAMIN D3) 10 mcg (400 unit) Tab, Take 400 Units by mouth once daily., Disp: , Rfl:     diclofenac-misoprostol  mg-mcg (ARTHROTEC 75)  mg-mcg per tablet, Take 1 tablet by mouth 2 (two) times daily., Disp: , Rfl:     dulaglutide (TRULICITY) 1.5 mg/0.5 mL pen injector, Inject 1.5 mg into the skin every 7 days., Disp: 12 pen , Rfl: 1    empagliflozin (JARDIANCE) 25 mg tablet, Take 1 tablet (25 mg total) by mouth once daily., Disp: 90 tablet, Rfl: 1    estradioL (ESTRACE) 0.5 MG tablet, Take 0.5 mg by mouth once daily., Disp: , Rfl:     ezetimibe (ZETIA) 10 mg tablet, Take 1 tablet (10 mg total) by mouth every evening. Take one tablet by mouth daily, Disp: 90 tablet, Rfl: 1    gabapentin (NEURONTIN) 600 MG tablet, Take 1 tablet (600 mg total) by mouth 3 (three) times daily., Disp: 270 tablet, Rfl: 1    HYDROcodone-acetaminophen (NORCO) 7.5-325 mg per tablet, Take 1 tablet by mouth every 6 (six) hours as needed., Disp: 15 tablet, Rfl: 0    insulin (LANTUS SOLOSTAR U-100 INSULIN) glargine 100 units/mL SubQ pen, Inject 30 units sq once daily, Disp: 30 mL, Rfl: 1    isosorbide mononitrate (IMDUR) 30 MG 24 hr tablet, Take 1 tablet (30 mg total) by mouth once daily., Disp: 30 tablet, Rfl: 11    loratadine (CLARITIN) 10 mg tablet, Take 10 mg by mouth once daily., Disp: , Rfl:     metFORMIN (GLUCOPHAGE) 1000 MG tablet, Take 1 tablet (1,000 mg total) by mouth 2 (two) times daily with meals., Disp: 180 tablet, Rfl: 3    methocarbamoL (ROBAXIN) 500 MG Tab, Take one tablet by mouth every 6 hours as needed, Disp: , Rfl:     ciprofloxacin HCl (CIPRO) 500 MG tablet, Take one tablet by mouth every 12  hours (Patient not taking: Reported on 5/7/2025), Disp: , Rfl:     dexAMETHasone (DECADRON) 4 MG Tab, Take one tablet by mouth every day for three days, repeat as needed (Patient not taking: Reported on 5/7/2025), Disp: , Rfl:     meloxicam (MOBIC) 7.5 MG tablet, Take one tablet by mouth daily, Disp: , Rfl:     mupirocin (BACTROBAN) 2 % ointment, Apply topically 3 (three) times daily. (Patient not taking: Reported on 5/7/2025), Disp: 45 g, Rfl: 0  [2]   Social History  Socioeconomic History    Marital status:    Tobacco Use    Smoking status: Never    Smokeless tobacco: Never   Substance and Sexual Activity    Alcohol use: Not Currently    Drug use: Not Currently    Sexual activity: Not Currently     Social Drivers of Health     Financial Resource Strain: Low Risk  (1/8/2025)    Received from [employer]    Overall Financial Resource Strain (CARDIA)     Difficulty of Paying Living Expenses: Not very hard   Food Insecurity: No Food Insecurity (1/8/2025)    Received from [employer]    Hunger Vital Sign     Worried About Running Out of Food in the Last Year: Never true     Ran Out of Food in the Last Year: Never true   Transportation Needs: No Transportation Needs (1/8/2025)    Received from [employer]    PRAPARE - Transportation     Lack of Transportation (Medical): No     Lack of Transportation (Non-Medical): No   Physical Activity: Insufficiently Active (1/8/2025)    Received from [employer]    Exercise Vital Sign     Days of Exercise per Week: 2 days     Minutes of Exercise per Session: 10 min   Stress: No Stress Concern Present (1/8/2025)    Received from [employer]    [employer] - Occupational Stress Questionnaire     Feeling of Stress : Only a little   Housing Stability: Unknown (1/8/2025)    Received from [employer]    Housing Stability Vital Sign     Unable to Pay for Housing in the Last Year: No     Homeless in the Last Year: No   [3]   Current Outpatient Medications:     calcium carbonate (OS-KUSH) 600 mg calcium (1,500 mg) Tab, Take 600 mg by mouth once daily., Disp: , Rfl:     cholecalciferol, vitamin D3, (VITAMIN D3) 10 mcg (400 unit) Tab, Take 400 Units by mouth once daily., Disp: , Rfl:     diclofenac-misoprostol  mg-mcg (ARTHROTEC 75)  mg-mcg per tablet, Take 1 tablet by mouth 2 (two) times daily., Disp: , Rfl:     dulaglutide (TRULICITY) 1.5 mg/0.5 mL pen injector, Inject 1.5 mg into the skin every 7 days., Disp: 12 pen , Rfl: 1    empagliflozin (JARDIANCE) 25 mg tablet, Take 1 tablet (25 mg total) by mouth once daily., Disp: 90 tablet, Rfl: 1    estradioL (ESTRACE) 0.5 MG tablet, Take 0.5 mg by mouth once daily., Disp: , Rfl:     ezetimibe (ZETIA) 10 mg tablet, Take 1 tablet (10 mg total) by mouth every evening. Take one tablet by mouth daily, Disp: 90 tablet, Rfl: 1    gabapentin (NEURONTIN) 600 MG tablet, Take 1 tablet (600 mg total) by mouth 3 (three) times daily., Disp: 270 tablet, Rfl: 1    HYDROcodone-acetaminophen (NORCO) 7.5-325 mg per tablet, Take 1 tablet by mouth every 6 (six) hours as needed., Disp: 15 tablet, Rfl: 0    insulin (LANTUS SOLOSTAR U-100 INSULIN) glargine 100 units/mL SubQ pen, Inject 30 units sq once daily, Disp: 30 mL, Rfl: 1    isosorbide mononitrate (IMDUR) 30 MG 24 hr tablet, Take 1 tablet (30 mg total) by mouth once daily., Disp: 30 tablet, Rfl: 11    loratadine (CLARITIN) 10 mg tablet, Take 10 mg by mouth once daily., Disp: , Rfl:     metFORMIN (GLUCOPHAGE) 1000 MG tablet, Take 1 tablet (1,000 mg total) by mouth 2 (two) times daily with meals., Disp: 180 tablet, Rfl: 3    methocarbamoL (ROBAXIN) 500 MG Tab, Take one tablet by mouth every 6 hours as needed, Disp: , Rfl:     ciprofloxacin HCl (CIPRO) 500 MG tablet, Take one tablet by mouth every 12 hours  (Patient not taking: Reported on 5/7/2025), Disp: , Rfl:     dexAMETHasone (DECADRON) 4 MG Tab, Take one tablet by mouth every day for three days, repeat as needed (Patient not taking: Reported on 5/7/2025), Disp: , Rfl:     meloxicam (MOBIC) 7.5 MG tablet, Take one tablet by mouth daily, Disp: , Rfl:     mupirocin (BACTROBAN) 2 % ointment, Apply topically 3 (three) times daily. (Patient not taking: Reported on 5/7/2025), Disp: 45 g, Rfl: 0  [4]   Social History  Socioeconomic History    Marital status:    Tobacco Use    Smoking status: Never    Smokeless tobacco: Never   Substance and Sexual Activity    Alcohol use: Not Currently    Drug use: Not Currently    Sexual activity: Not Currently     Social Drivers of Health     Financial Resource Strain: Low Risk  (1/8/2025)    Received from [employer]    Overall Financial Resource Strain (CARDIA)     Difficulty of Paying Living Expenses: Not very hard   Food Insecurity: No Food Insecurity (1/8/2025)    Received from [employer]    Hunger Vital Sign     Worried About Running Out of Food in the Last Year: Never true     Ran Out of Food in the Last Year: Never true   Transportation Needs: No Transportation Needs (1/8/2025)    Received from [employer]    PRAPARE - Transportation     Lack of Transportation (Medical): No     Lack of Transportation (Non-Medical): No   Physical Activity: Insufficiently Active (1/8/2025)    Received from [employer]    Exercise Vital Sign     Days of Exercise per Week: 2 days     Minutes of Exercise per Session: 10 min   Stress: No Stress Concern Present (1/8/2025)    Received from [employer]    [employer] [place] [employer] - Occupational Stress Questionnaire     Feeling of Stress : Only a little   Housing Stability: Unknown (1/8/2025)    Received from [employer]    Housing Stability Vital Sign     Unable to Pay for Housing in the Last Year: No     Homeless in the Last Year: No

## 2025-05-08 NOTE — PATIENT INSTRUCTIONS
Your surgery is scheduled at [facility] in [place] for 2025    Pre-Op Testin2025  ___x____ Lab (Clinic Lab 1st Floor) and Total Joint Education Class @ 1:00p.m.    ___x____ Total Joint Patients Only--EKG, Chest X-ray, Labs and Cardiac/Medical Clearance appointment with Dr. Mazariegos on 2025 at 10:15a.m.    *[facility] will contact you the day before surgery to give you the arrival time.    *A  is required to provide transportation for you the day of surgery.    *Do NOT eat or drink anything after midnight the night before your surgery.    *Bring all medications in their original bottles.    *Bring anything you may need for an overnight stay.     *Bathe with Hibiclens the night or morning before surgery.    *The morning of your surgery ONLY take blood pressure medications, heart medications, medications for acid reflux, and thyroid medications (the morning dose only).  *Take these medications with a sip of water.    *Do not take Insulin or Diabetic Medications the night before or the morning of your surgery, unless directed otherwise. Stop your Trulicity 7 days prior to surgery.     *Be sure that you have stopped blood thinners at the appropriate time, as instructed.  (_____ days prior to surgery)    *Bring your C-Pap machine if you have one.    *All jewelry and piercings MUST be removed prior to surgery.    *False eye lashes must be removed prior to surgery.    *Any questions regarding co-pays or deductibles with insurance, please contact the [facility] [employer] at #[phone].    *For Financial Assistance you may call #[phone] or #[phone].    Thank you for choosing Dr. Juan Richards for your Orthopedic needs.  We look forward to caring for you. If you have any questions, please contact our office at [phone].

## 2025-05-20 ENCOUNTER — HOSPITAL ENCOUNTER (OUTPATIENT)
Dept: RADIOLOGY | Facility: HOSPITAL | Age: 75
Discharge: HOME | End: 2025-05-20
Attending: FAMILY MEDICINE
Payer: MEDICARE

## 2025-05-20 VITALS — WEIGHT: 130 LBS | BODY MASS INDEX: 20.89 KG/M2 | HEIGHT: 66 IN

## 2025-05-20 DIAGNOSIS — Z12.31 VISIT FOR SCREENING MAMMOGRAM: ICD-10-CM

## 2025-05-20 PROCEDURE — 77067 SCR MAMMO BI INCL CAD: CPT | Mod: TC

## 2025-05-20 PROCEDURE — 77063 BREAST TOMOSYNTHESIS BI: CPT | Mod: 26,,, | Performed by: RADIOLOGY

## 2025-05-20 PROCEDURE — 77067 SCR MAMMO BI INCL CAD: CPT | Mod: 26,,, | Performed by: RADIOLOGY

## 2025-06-05 ENCOUNTER — TELEPHONE (OUTPATIENT)
Dept: ORTHOPEDICS | Facility: CLINIC | Age: 75
End: 2025-06-05
Payer: MEDICARE

## 2025-06-10 ENCOUNTER — TELEPHONE (OUTPATIENT)
Dept: CARDIOLOGY | Facility: HOSPITAL | Age: 75
End: 2025-06-10
Payer: MEDICARE

## 2025-06-20 ENCOUNTER — HOSPITAL ENCOUNTER (EMERGENCY)
Facility: HOSPITAL | Age: 75
Discharge: HOME | End: 2025-06-20
Payer: MEDICARE

## 2025-06-20 VITALS
DIASTOLIC BLOOD PRESSURE: 77 MMHG | OXYGEN SATURATION: 97 % | TEMPERATURE: 98 F | RESPIRATION RATE: 16 BRPM | HEART RATE: 84 BPM | BODY MASS INDEX: 20.89 KG/M2 | WEIGHT: 130 LBS | SYSTOLIC BLOOD PRESSURE: 137 MMHG | HEIGHT: 66 IN

## 2025-06-20 DIAGNOSIS — R29.818 ACUTE FOCAL NEUROLOGICAL DEFICIT: ICD-10-CM

## 2025-06-20 DIAGNOSIS — M50.30 DEGENERATIVE DISC DISEASE, CERVICAL: ICD-10-CM

## 2025-06-20 DIAGNOSIS — R29.898 WEAKNESS OF RIGHT UPPER EXTREMITY: Primary | ICD-10-CM

## 2025-06-20 LAB
ALBUMIN SERPL BCP-MCNC: 3.9 G/DL (ref 3.4–4.8)
ALBUMIN/GLOB SERPL: 0.8 {RATIO}
ALP SERPL-CCNC: 49 U/L (ref 40–150)
ALT SERPL W P-5'-P-CCNC: 10 U/L
ANION GAP SERPL CALCULATED.3IONS-SCNC: 13 MMOL/L (ref 7–16)
AST SERPL W P-5'-P-CCNC: 24 U/L (ref 11–45)
BASOPHILS # BLD AUTO: 0.02 K/UL (ref 0–0.2)
BASOPHILS NFR BLD AUTO: 0.4 % (ref 0–1)
BILIRUB SERPL-MCNC: 0.3 MG/DL
BUN SERPL-MCNC: 30 MG/DL (ref 10–20)
BUN/CREAT SERPL: 26 (ref 6–20)
CALCIUM SERPL-MCNC: 9.5 MG/DL (ref 8.4–10.2)
CHLORIDE SERPL-SCNC: 104 MMOL/L (ref 98–107)
CHOLEST SERPL-MCNC: 203 MG/DL
CHOLEST/HDLC SERPL: 4.3 {RATIO}
CO2 SERPL-SCNC: 23 MMOL/L (ref 23–31)
CREAT SERPL-MCNC: 1.16 MG/DL (ref 0.55–1.02)
DIFFERENTIAL METHOD BLD: ABNORMAL
EGFR (NO RACE VARIABLE) (RUSH/TITUS): 49 ML/MIN/1.73M2
EOSINOPHIL # BLD AUTO: 0.22 K/UL (ref 0–0.5)
EOSINOPHIL NFR BLD AUTO: 4 % (ref 1–4)
ERYTHROCYTE [DISTWIDTH] IN BLOOD BY AUTOMATED COUNT: 13.9 % (ref 11.5–14.5)
GLOBULIN SER-MCNC: 4.6 G/DL (ref 2–4)
GLUCOSE SERPL-MCNC: 229 MG/DL (ref 82–115)
HCT VFR BLD AUTO: 34.6 % (ref 38–47)
HDLC SERPL-MCNC: 47 MG/DL (ref 35–60)
HGB BLD-MCNC: 11 G/DL (ref 12–16)
IMM GRANULOCYTES # BLD AUTO: 0.02 K/UL (ref 0–0.04)
IMM GRANULOCYTES NFR BLD: 0.4 % (ref 0–0.4)
INR BLD: 1.03
LDLC SERPL CALC-MCNC: 133 MG/DL
LDLC/HDLC SERPL: 2.8 {RATIO}
LYMPHOCYTES # BLD AUTO: 1.34 K/UL (ref 1–4.8)
LYMPHOCYTES NFR BLD AUTO: 24.5 % (ref 27–41)
MCH RBC QN AUTO: 26.1 PG (ref 27–31)
MCHC RBC AUTO-ENTMCNC: 31.8 G/DL (ref 32–36)
MCV RBC AUTO: 82.2 FL (ref 80–96)
MONOCYTES # BLD AUTO: 0.73 K/UL (ref 0–0.8)
MONOCYTES NFR BLD AUTO: 13.3 % (ref 2–6)
MPC BLD CALC-MCNC: 9.1 FL (ref 9.4–12.4)
NEUTROPHILS # BLD AUTO: 3.14 K/UL (ref 1.8–7.7)
NEUTROPHILS NFR BLD AUTO: 57.4 % (ref 53–65)
NONHDLC SERPL-MCNC: 156 MG/DL
NRBC # BLD AUTO: 0 X10E3/UL
NRBC, AUTO (.00): 0 %
PLATELET # BLD AUTO: 455 K/UL (ref 150–400)
POTASSIUM SERPL-SCNC: 4.5 MMOL/L (ref 3.5–5.1)
PROT SERPL-MCNC: 8.5 G/DL (ref 5.8–7.6)
PROTHROMBIN TIME: 13.6 SECONDS (ref 11.7–14.7)
RBC # BLD AUTO: 4.21 M/UL (ref 4.2–5.4)
SODIUM SERPL-SCNC: 135 MMOL/L (ref 136–145)
TRIGL SERPL-MCNC: 114 MG/DL (ref 37–140)
TSH SERPL DL<=0.005 MIU/L-ACNC: 2.09 UIU/ML (ref 0.35–4.94)
VLDLC SERPL-MCNC: 23 MG/DL
WBC # BLD AUTO: 5.47 K/UL (ref 4.5–11)

## 2025-06-20 PROCEDURE — 99285 EMERGENCY DEPT VISIT HI MDM: CPT | Mod: 25

## 2025-06-20 PROCEDURE — 80061 LIPID PANEL: CPT | Performed by: NURSE PRACTITIONER

## 2025-06-20 PROCEDURE — 85025 COMPLETE CBC W/AUTO DIFF WBC: CPT | Performed by: NURSE PRACTITIONER

## 2025-06-20 PROCEDURE — 80053 COMPREHEN METABOLIC PANEL: CPT | Performed by: NURSE PRACTITIONER

## 2025-06-20 PROCEDURE — 93010 ELECTROCARDIOGRAM REPORT: CPT | Mod: ,,, | Performed by: INTERNAL MEDICINE

## 2025-06-20 PROCEDURE — 36415 COLL VENOUS BLD VENIPUNCTURE: CPT | Performed by: NURSE PRACTITIONER

## 2025-06-20 PROCEDURE — 84443 ASSAY THYROID STIM HORMONE: CPT | Performed by: NURSE PRACTITIONER

## 2025-06-20 PROCEDURE — 85610 PROTHROMBIN TIME: CPT | Performed by: NURSE PRACTITIONER

## 2025-06-20 PROCEDURE — 93005 ELECTROCARDIOGRAM TRACING: CPT

## 2025-06-20 RX ORDER — GABAPENTIN 600 MG/1
600 TABLET ORAL 2 TIMES DAILY
COMMUNITY
Start: 2024-10-22 | End: 2025-10-21

## 2025-06-20 RX ORDER — FLUOXETINE 10 MG/1
10 CAPSULE ORAL DAILY
COMMUNITY

## 2025-06-20 RX ORDER — FENOFIBRATE 145 MG/1
145 TABLET, FILM COATED ORAL DAILY
COMMUNITY
Start: 2024-07-10

## 2025-06-20 RX ORDER — HYDROCODONE BITARTRATE AND ACETAMINOPHEN 5; 325 MG/1; MG/1
1 TABLET ORAL DAILY PRN
COMMUNITY

## 2025-06-20 RX ORDER — CILOSTAZOL 100 MG/1
100 TABLET ORAL 2 TIMES DAILY
COMMUNITY
Start: 2024-11-21 | End: 2025-11-21

## 2025-06-20 RX ORDER — DULAGLUTIDE 3 MG/.5ML
3 INJECTION, SOLUTION SUBCUTANEOUS
COMMUNITY
Start: 2024-12-18

## 2025-06-21 NOTE — DISCHARGE INSTRUCTIONS
C4-C7 foraminal stenosis; can cause a range of symptoms, primarily radiating pain, numbness, tingling, and weakness in the neck, shoulder, arm, and hand, corresponding to the specific nerve root affected. Follow up with PCP in 48-72 hours. Return to ED if any new or worsening of symptoms occur.

## 2025-06-21 NOTE — ED PROVIDER NOTES
Encounter Date: 6/20/2025       History     Chief Complaint   Patient presents with    Extremity Weakness     Pt c/o sitting up in the bed watching tv and her phone rang, and was unable to move her right hand/fingers and limited ROM to right arm. Denies injury      74 year old female presents to ED with complaint of right arm weakness. Patient states she was attempting to reach her cellphone that was ringing and noted she could not lift her right arm or grab objects. Patient reports incident occurred approximately 1.5 hours prior to arrival. Denies headache, visual changes, dizziness, chest pain, shortness of breath. She states approximately 1 week ago she was assisting herself up by pushing off her elbow and experienced pain and tenderness to her right elbow. Patient is right hand dominant.     The history is provided by the patient.     Review of patient's allergies indicates:   Allergen Reactions    Augmentin [amoxicillin-pot clavulanate] Itching and Rash     Past Medical History:   Diagnosis Date    Diabetes mellitus, type 2     Hyperlipidemia     Neuropathy     Rheumatoid arthritis, unspecified      Past Surgical History:   Procedure Laterality Date    DEBRIDEMENT OF LOWER EXTREMITY Left 03/28/2022    Procedure: DEBRIDEMENT, LOWER EXTREMITY;  Surgeon: Kobi Jacome DO;  Location: [facility] OR;  Service: General;  Laterality: Left;    TOE AMPUTATION Left 03/28/2022    Procedure: AMPUTATION, TOE;  Surgeon: Kobi Jacome DO;  Location: [facility] OR;  Service: General;  Laterality: Left;  possible left 2nd toe amp     Family History   Problem Relation Name Age of Onset    Breast cancer Mother      Cancer Mother      Alzheimer's disease Father      Cancer Sister      Breast cancer Sister      Arthritis Sister      Diabetes Sister      Thyroid disease Sister      Diabetes Sister      Arthritis Sister      Diabetes Sister      Diabetes Brother      Dementia Brother      Diabetes Brother      Diabetes Brother       Seizures Brother      No Known Problems Brother      Diabetes Brother      Hypertension Brother       Social History[1]  Review of Systems   Constitutional:  Negative for chills and fever.   Eyes:  Negative for photophobia and visual disturbance.   Respiratory:  Negative for cough and shortness of breath.    Cardiovascular:  Negative for chest pain and palpitations.   Gastrointestinal:  Negative for rectal pain and vomiting.   Genitourinary:  Negative for dysuria and urgency.   Musculoskeletal:  Positive for arthralgias. Negative for myalgias.   Skin:  Negative for color change and wound.   Neurological:  Positive for weakness. Negative for dizziness.   Hematological:  Negative for adenopathy. Does not bruise/bleed easily.   Psychiatric/Behavioral:  Negative for agitation and confusion.    All other systems reviewed and are negative.      Physical Exam     Initial Vitals [06/20/25 2054]   BP Pulse Resp Temp SpO2   134/65 (!) 112 18 98.3 °F (36.8 °C) 99 %      MAP       --         Physical Exam    Nursing note and vitals reviewed.  Constitutional: She appears well-developed and well-nourished.   HENT:   Head: Normocephalic and atraumatic.   Eyes: EOM are normal. Pupils are equal, round, and reactive to light.   Neck: Neck supple.   Normal range of motion.  Cardiovascular:  Normal rate and regular rhythm.           No murmur heard.  Pulmonary/Chest: She has no wheezes. She has no rhonchi.   Abdominal: Abdomen is soft. She exhibits no distension. There is no abdominal tenderness.   Musculoskeletal:         General: No tenderness or edema.      Cervical back: Normal range of motion and neck supple.     Lymphadenopathy:     She has no cervical adenopathy.   Neurological: She is alert and oriented to person, place, and time. No sensory deficit. She exhibits abnormal muscle tone.   Right upper extremity weakness; flaccid.   Skin: Skin is warm and dry. Capillary refill takes less than 2 seconds.   Psychiatric: She has a  normal mood and affect. Thought content normal.         Medical Screening Exam   See Full Note    ED Course   Procedures  Labs Reviewed   COMPREHENSIVE METABOLIC PANEL - Abnormal       Result Value    Sodium 135 (*)     Potassium 4.5      Chloride 104      CO2 23      Anion Gap 13      Glucose 229 (*)     BUN 30 (*)     Creatinine 1.16 (*)     BUN/Creatinine Ratio 26 (*)     Calcium 9.5      Total Protein 8.5 (*)     Albumin 3.9      Globulin 4.6 (*)     A/G Ratio 0.8      Bilirubin, Total 0.3      Alk Phos 49      ALT 10      AST 24      eGFR 49 (*)    LIPID PANEL - Abnormal    Triglycerides 114      Cholesterol 203 (*)     HDL Cholesterol 47      Cholesterol/HDL Ratio (Risk Factor) 4.3      Non-      LDL Calculated 133      LDL/HDL 2.8      VLDL 23     CBC WITH DIFFERENTIAL - Abnormal    WBC 5.47      RBC 4.21      Hemoglobin 11.0 (*)     Hematocrit 34.6 (*)     MCV 82.2      MCH 26.1 (*)     MCHC 31.8 (*)     RDW 13.9      Platelet Count 455 (*)     MPV 9.1 (*)     Neutrophils % 57.4      Lymphocytes % 24.5 (*)     Monocytes % 13.3 (*)     Eosinophils % 4.0      Basophils % 0.4      Immature Granulocytes % 0.4      nRBC, Auto 0.0      Neutrophils, Abs 3.14      Lymphocytes, Absolute 1.34      Monocytes, Absolute 0.73      Eosinophils, Absolute 0.22      Basophils, Absolute 0.02      Immature Granulocytes, Absolute 0.02      nRBC, Absolute 0.00      Diff Type Auto     PROTIME-INR - Normal    PT 13.6      INR 1.03     TSH - Normal    TSH 2.086     CBC W/ AUTO DIFFERENTIAL    Narrative:     The following orders were created for panel order CBC W/ AUTO DIFFERENTIAL.  Procedure                               Abnormality         Status                     ---------                               -----------         ------                     CBC with Differential[[id number]]       Abnormal            Final result                 Please view results for these tests on the individual orders.   POCT GLUCOSE  MONITORING CONTINUOUS          Imaging Results              CT Cervical Spine Without Contrast (In process)                      CT Head Without Contrast (In process)                      Medications - No data to display  Medical Decision Making  74 year old female presents to ED with complaint of right arm weakness. Patient states she was attempting to reach her cellphone that was ringing and noted she could not lift her right arm or grab objects. Patient reports incident occurred approximately 1.5 hours prior to arrival. Denies headache, visual changes, dizziness, chest pain, shortness of breath. She states approximately 1 week ago she was assisting herself up by pushing off her elbow and experienced pain and tenderness to her right elbow. Patient is right hand dominant.     Labs, diagnostics ordered and reviewed  Radiologist interpretation reviewed    EKG ordered and reviewed  EKG significant for no ST elevation  Rate 94  Rhythm NSR  Interpreted by ED physician    Case discussed with Dr. Binh Jaramillo spine referral    Amount and/or Complexity of Data Reviewed  Labs: ordered.  Radiology: ordered.     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  Severe foraminal stenosis; notable at c4-C7                                      Clinical Impression:   Final diagnoses:  [R29.818] Acute focal neurological deficit  [R29.898] Weakness of right upper extremity (Primary)  [M50.30] Degenerative disc disease, cervical        ED Disposition Condition    Discharge Stable          ED Prescriptions    None       Follow-up Information    None              [1]   Social History  Tobacco Use    Smoking status: Never    Smokeless tobacco: Never   Substance Use Topics    Alcohol use: Not Currently    Drug use: Not Currently        Ashtyn [place], [place]  06/20/25 0130

## 2025-06-23 ENCOUNTER — HOSPITAL ENCOUNTER (EMERGENCY)
Facility: HOSPITAL | Age: 75
Discharge: HOME | End: 2025-06-24
Attending: FAMILY MEDICINE
Payer: MEDICARE

## 2025-06-23 ENCOUNTER — TELEPHONE (OUTPATIENT)
Dept: ORTHOPEDICS | Facility: CLINIC | Age: 75
End: 2025-06-23
Payer: MEDICARE

## 2025-06-23 DIAGNOSIS — M54.12 CERVICAL RADICULOPATHY AT C6: Primary | ICD-10-CM

## 2025-06-23 PROCEDURE — 99285 EMERGENCY DEPT VISIT HI MDM: CPT | Mod: 25

## 2025-06-23 NOTE — TELEPHONE ENCOUNTER
Copied from CRM #[id number]. Topic: General Inquiry - Patient Advice  >> Jun 23, 2025  9:58 AM Julisa wrote:  Who Called: DORIAN GARCIA, DAUGHTER        Who Left Message for Patient:  Does the patient know what this is regarding?:YES      Preferred Method of Contact: Phone Call  Patient's Preferred Phone Number on File: [phone]  Best Call Back Number, if different:  Additional Information: SAYS HER MOTHER IS SCHEDULED FOR KNEE SURGERY THIS THURSDAY BUT WAS SEEN IN THE ER ON FRIDAY JUNE 20 FOR RIGHT ARM PAIN. SHE IS WONDERING IF THIS WILL INTERFERE WITH HER HAVING SURGERY.

## 2025-06-23 NOTE — TELEPHONE ENCOUNTER
Called and spoke to patient's daughter.  She stated that she had went to the ER and is unable to use her right upper extremity. She is having weakness. Dr. Richards stated to see how she feels on Wednesday and if her symptoms haven't resolved we will need to cancel her surgery. She voiced understanding.

## 2025-06-23 NOTE — ED NOTES
06/23/2025  3:43 pm  ED Chart with referral and consult referral form has been faxed to [facility].  Pt has been referred to see Dr. Alexander in clinic.

## 2025-06-24 VITALS
OXYGEN SATURATION: 95 % | BODY MASS INDEX: 21.53 KG/M2 | DIASTOLIC BLOOD PRESSURE: 74 MMHG | SYSTOLIC BLOOD PRESSURE: 145 MMHG | TEMPERATURE: 99 F | WEIGHT: 134 LBS | HEIGHT: 66 IN | RESPIRATION RATE: 20 BRPM | HEART RATE: 89 BPM

## 2025-06-24 LAB
OHS QRS DURATION: 78 MS
OHS QTC CALCULATION: 425 MS
URATE SERPL-MCNC: 2.9 MG/DL (ref 2.6–6)

## 2025-06-24 PROCEDURE — 96372 THER/PROPH/DIAG INJ SC/IM: CPT | Performed by: FAMILY MEDICINE

## 2025-06-24 PROCEDURE — 84550 ASSAY OF BLOOD/URIC ACID: CPT | Performed by: FAMILY MEDICINE

## 2025-06-24 PROCEDURE — 36415 COLL VENOUS BLD VENIPUNCTURE: CPT | Performed by: FAMILY MEDICINE

## 2025-06-24 PROCEDURE — 63600175 PHARM REV CODE 636 W HCPCS: Performed by: FAMILY MEDICINE

## 2025-06-24 RX ORDER — MORPHINE SULFATE 4 MG/ML
4 INJECTION, SOLUTION INTRAMUSCULAR; INTRAVENOUS
Refills: 0 | Status: DISCONTINUED | OUTPATIENT
Start: 2025-06-24 | End: 2025-06-24

## 2025-06-24 RX ORDER — ONDANSETRON HYDROCHLORIDE 2 MG/ML
4 INJECTION, SOLUTION INTRAVENOUS
Status: DISCONTINUED | OUTPATIENT
Start: 2025-06-24 | End: 2025-06-24

## 2025-06-24 RX ORDER — KETOROLAC TROMETHAMINE 30 MG/ML
60 INJECTION, SOLUTION INTRAMUSCULAR; INTRAVENOUS
Status: COMPLETED | OUTPATIENT
Start: 2025-06-24 | End: 2025-06-24

## 2025-06-24 RX ORDER — MORPHINE SULFATE 4 MG/ML
4 INJECTION, SOLUTION INTRAMUSCULAR; INTRAVENOUS
Status: COMPLETED | OUTPATIENT
Start: 2025-06-24 | End: 2025-06-24

## 2025-06-24 RX ORDER — ONDANSETRON HYDROCHLORIDE 2 MG/ML
4 INJECTION, SOLUTION INTRAVENOUS
Status: COMPLETED | OUTPATIENT
Start: 2025-06-24 | End: 2025-06-24

## 2025-06-24 RX ADMIN — ONDANSETRON 4 MG: 2 INJECTION INTRAMUSCULAR; INTRAVENOUS at 02:06

## 2025-06-24 RX ADMIN — KETOROLAC TROMETHAMINE 60 MG: 30 INJECTION, SOLUTION INTRAMUSCULAR at 02:06

## 2025-06-24 RX ADMIN — MORPHINE SULFATE 4 MG: 4 INJECTION INTRAVENOUS at 02:06

## 2025-06-24 NOTE — ED PROVIDER NOTES
Encounter Date: 6/23/2025       History     Chief Complaint   Patient presents with    Wrist Pain     Pt presents to ed with c/o right wrist pain that started today. Was seen a couple days ago for numbness in the same arm and hand but now developed pain      74-year-old female presents to the emergency department to be evaluated for right wrist pain, swelling, and heat that began earlier today.  She was evaluated 3 days ago for the inability to use her right arm.  She states that 3 days ago, the day began normal, and she was able to eat breakfast, fixed lunch, and then took a nap.  When she woke from her nap, she was unable to move her right arm.  Her evaluation in the emergency department indicated a possible nerve involvement.  Since that time, she has still not been able to move her right arm, and today noticed that there was swelling, heat and more pain in the right wrist extending down into the fingers.  She denies any history of blood clots, and is not currently taking any anticoagulants.    The history is provided by the patient and a relative.     Review of patient's allergies indicates:   Allergen Reactions    Augmentin [amoxicillin-pot clavulanate] Itching and Rash     Past Medical History:   Diagnosis Date    Diabetes mellitus, type 2     Hyperlipidemia     Neuropathy     Rheumatoid arthritis, unspecified      Past Surgical History:   Procedure Laterality Date    DEBRIDEMENT OF LOWER EXTREMITY Left 03/28/2022    Procedure: DEBRIDEMENT, LOWER EXTREMITY;  Surgeon: Kobi Jacome DO;  Location: [facility] OR;  Service: General;  Laterality: Left;    TOE AMPUTATION Left 03/28/2022    Procedure: AMPUTATION, TOE;  Surgeon: Kobi Jacome DO;  Location: [facility] OR;  Service: General;  Laterality: Left;  possible left 2nd toe amp     Family History   Problem Relation Name Age of Onset    Breast cancer Mother      Cancer Mother      Alzheimer's disease Father      Cancer Sister      Breast cancer Sister       Arthritis Sister      Diabetes Sister      Thyroid disease Sister      Diabetes Sister      Arthritis Sister      Diabetes Sister      Diabetes Brother      Dementia Brother      Diabetes Brother      Diabetes Brother      Seizures Brother      No Known Problems Brother      Diabetes Brother      Hypertension Brother       Social History[1]  Review of Systems   Constitutional: Negative.    HENT: Negative.     Respiratory: Negative.     Cardiovascular: Negative.    Gastrointestinal: Negative.    Genitourinary: Negative.    Musculoskeletal:  Positive for arthralgias, joint swelling and myalgias. Negative for back pain, gait problem, neck pain and neck stiffness.   Skin:  Positive for color change. Negative for pallor, rash and wound.   Neurological:  Positive for weakness (Right upper extremity). Negative for headaches.   Hematological: Negative.    Psychiatric/Behavioral: Negative.         Physical Exam     Initial Vitals [06/23/25 2108]   BP Pulse Resp Temp SpO2   (!) 164/77 93 16 98.2 °F (36.8 °C) 98 %      MAP       --         Physical Exam    Vitals reviewed.  Constitutional: She appears well-developed and well-nourished. She is not diaphoretic. No distress.   HENT:   Head: Normocephalic and atraumatic.   Right Ear: External ear normal.   Left Ear: External ear normal.   Nose: Nose normal. Mouth/Throat: Oropharynx is clear and moist. No oropharyngeal exudate.   Eyes: EOM are normal. Pupils are equal, round, and reactive to light.   Neck: Neck supple. No tracheal deviation present.   Normal range of motion.  Cardiovascular:  Normal rate, regular rhythm and intact distal pulses.           Pulmonary/Chest: Breath sounds normal. No stridor. No respiratory distress.   Abdominal: Abdomen is soft. Bowel sounds are normal. She exhibits no distension. There is no abdominal tenderness.   Musculoskeletal:      Right wrist: Swelling and tenderness present. Decreased range of motion. Normal pulse.      Left wrist: Normal.       Right hand: Swelling present. Decreased range of motion. Decreased strength. Normal pulse.      Left hand: Normal.        Arms:       Cervical back: Normal range of motion and neck supple.     Neurological: She is alert and oriented to person, place, and time. She exhibits abnormal muscle tone (Unable to move right upper extremity on command). GCS score is 15. GCS eye subscore is 4. GCS verbal subscore is 5. GCS motor subscore is 6.   Skin: Skin is warm and dry. Capillary refill takes less than 2 seconds. There is erythema.   Psychiatric: She has a normal mood and affect. Thought content normal.         Medical Screening Exam   See Full Note    ED Course   Procedures  Labs Reviewed   URIC ACID - Normal       Result Value    Uric Acid 2.9            Imaging Results              US Upper Extremity Veins Right (Final result)  Result time 06/24/25 08:02:01      Final result by Vince Ching MD (06/24/25 08:02:01)                   Impression:      No thrombus in central veins of the right upper extremity.      Electronically signed by: Vince Ching  Date:    06/24/2025  Time:    08:02               Narrative:    EXAMINATION:  US UPPER EXTREMITY VEINS RIGHT    CLINICAL HISTORY:  right arm pain, swelling, numbness, loss of function;    TECHNIQUE:  Duplex and color flow Doppler evaluation and dynamic compression was performed of the right upper extremity veins.    COMPARISON:  None    FINDINGS:  Central veins: The internal jugular, subclavian, and axillary veins are patent and free of thrombus.    Arm veins: The brachial, basilic, and cephalic veins are patent and compressible.    Contralateral subclavian/internal jugular veins: The left subclavian and internal jugular veins are patent and free of thrombus.    Other findings: None.                                       Medications   ketorolac injection 60 mg (60 mg Intramuscular Given 6/24/25 0219)   morphine injection 4 mg (4 mg Intramuscular Given 6/24/25 0219)    ondansetron injection 4 mg (4 mg Intramuscular Given 6/24/25 8706)     Medical Decision Making  74-year-old female presents to the emergency department to be evaluated for right wrist pain, swelling, and heat that began earlier today.  She was evaluated 3 days ago for the inability to use her right arm.  She states that 3 days ago, the day began normal, and she was able to eat breakfast, fixed lunch, and then took a nap.  When she woke from her nap, she was unable to move her right arm.  Her evaluation in the emergency department indicated a possible nerve involvement.  Since that time, she has still not been able to move her right arm, and today noticed that there was swelling, heat and more pain in the right wrist extending down into the fingers.  She denies any history of blood clots, and is not currently taking any anticoagulants.    Venous Doppler of the right upper extremity ordered    2355 care transferred to Dr. Browning.    Amount and/or Complexity of Data Reviewed  Labs: ordered.    Risk  Prescription drug management.                                      Clinical Impression:   Final diagnoses:  [M54.12] Cervical radiculopathy at C6 (Primary)        ED Disposition Condition    Discharge Stable          ED Prescriptions    None       Follow-up Information    None              [1]   Social History  Tobacco Use    Smoking status: Never    Smokeless tobacco: Never   Substance Use Topics    Alcohol use: Not Currently    Drug use: Not Currently        Bogdan Guerrero FNP  06/24/25 1200

## 2025-06-25 ENCOUNTER — TELEPHONE (OUTPATIENT)
Dept: ORTHOPEDICS | Facility: CLINIC | Age: 75
End: 2025-06-25
Payer: MEDICARE

## 2025-06-25 NOTE — TELEPHONE ENCOUNTER
Spoke with patient's daughter and she stated that she needs to cancel her surgery for tomorrow.  She is still having issues with her arm and unable to use her arm. She has an appt with Dr. Wiley Alexander. They will call back to reschedule once they get the shoulder/arm issues resolved.

## 2025-06-25 NOTE — TELEPHONE ENCOUNTER
Copied from CRM #[id number]. Topic: General Inquiry - Return Call  >> Jun 25, 2025  8:43 AM Narcisa wrote:  Who Called: Lauryn Galaviz for Nicol Wilson    Caller is requesting assistance/information from provider's office.    Lauryn is requesting a call back regarding for her mother's surgery tomorrow.      Preferred Method of Contact: Phone Call    Best Call Back Number, if different:[phone]  Additional Information:

## 2025-06-25 NOTE — TELEPHONE ENCOUNTER
Copied from CRM #[id number]. Topic: Appointments - Appointment Cancellation  >> Jun 25, 2025  9:50 AM Connor wrote:  Who Called: Nicol Reese    Pt is calling asking if a a nurse would give her a call about her surgery she has some questions about if it needs to be cancelled     Preferred Method of Contact: Phone Call  Patient's Preferred Phone Number on File: [phone]   Best Call Back Number, if different:  Additional Information:   Statement Selected

## 2025-06-25 NOTE — TELEPHONE ENCOUNTER
Called patient and she wanted to know if her surgery was rescheduled. I advised her that I had spoke to her daughter and had cancelled her surgery and once she gets her upper extremity taken care of they can call back and reschedule surgery.

## 2025-06-26 PROCEDURE — 85610 PROTHROMBIN TIME: CPT | Performed by: ORTHOPAEDIC SURGERY

## 2025-07-01 ENCOUNTER — ANESTHESIA EVENT (OUTPATIENT)
Dept: SURGERY | Facility: HOSPITAL | Age: 75
End: 2025-07-01
Payer: MEDICARE

## 2025-07-01 ENCOUNTER — ANESTHESIA (OUTPATIENT)
Dept: SURGERY | Facility: HOSPITAL | Age: 75
End: 2025-07-01
Payer: MEDICARE

## 2025-07-01 ENCOUNTER — HOSPITAL ENCOUNTER (INPATIENT)
Facility: HOSPITAL | Age: 75
LOS: 4 days | Discharge: NURSING, REHAB OR LONG-TERM CARE | DRG: 430 | End: 2025-07-05
Attending: ORTHOPAEDIC SURGERY | Admitting: ORTHOPAEDIC SURGERY
Payer: MEDICARE

## 2025-07-01 DIAGNOSIS — M50.00 CERVICAL DISC DISEASE WITH MYELOPATHY: ICD-10-CM

## 2025-07-01 PROBLEM — M47.812 CERVICAL SPONDYLOSIS: Status: ACTIVE | Noted: 2025-07-01

## 2025-07-01 LAB
GLUCOSE SERPL-MCNC: 170 MG/DL (ref 70–105)
GLUCOSE SERPL-MCNC: 290 MG/DL (ref 70–105)
GLUCOSE SERPL-MCNC: 367 MG/DL (ref 70–105)
GLUCOSE SERPL-MCNC: 89 MG/DL (ref 70–105)

## 2025-07-01 PROCEDURE — 37000008 HC ANESTHESIA 1ST 15 MINUTES: Performed by: ORTHOPAEDIC SURGERY

## 2025-07-01 PROCEDURE — 27202714 HC BLADE, VIDEO SCOPE: Performed by: ANESTHESIOLOGY

## 2025-07-01 PROCEDURE — 25000003 PHARM REV CODE 250: Performed by: ORTHOPAEDIC SURGERY

## 2025-07-01 PROCEDURE — 94761 N-INVAS EAR/PLS OXIMETRY MLT: CPT

## 2025-07-01 PROCEDURE — 27201423 OPTIME MED/SURG SUP & DEVICES STERILE SUPPLY: Performed by: ORTHOPAEDIC SURGERY

## 2025-07-01 PROCEDURE — 27800903 OPTIME MED/SURG SUP & DEVICES OTHER IMPLANTS: Performed by: ORTHOPAEDIC SURGERY

## 2025-07-01 PROCEDURE — 27000655: Performed by: ANESTHESIOLOGY

## 2025-07-01 PROCEDURE — 71000016 HC POSTOP RECOV ADDL HR: Performed by: ORTHOPAEDIC SURGERY

## 2025-07-01 PROCEDURE — 63600175 PHARM REV CODE 636 W HCPCS: Performed by: ORTHOPAEDIC SURGERY

## 2025-07-01 PROCEDURE — 25000242 PHARM REV CODE 250 ALT 637 W/ HCPCS: Performed by: ORTHOPAEDIC SURGERY

## 2025-07-01 PROCEDURE — 37000009 HC ANESTHESIA EA ADD 15 MINS: Performed by: ORTHOPAEDIC SURGERY

## 2025-07-01 PROCEDURE — 27202344 HC EYESHIELD: Performed by: ANESTHESIOLOGY

## 2025-07-01 PROCEDURE — 97165 OT EVAL LOW COMPLEX 30 MIN: CPT

## 2025-07-01 PROCEDURE — 63600175 PHARM REV CODE 636 W HCPCS: Performed by: NURSE ANESTHETIST, CERTIFIED REGISTERED

## 2025-07-01 PROCEDURE — 0RG20A0 FUSION OF 2 OR MORE CERVICAL VERTEBRAL JOINTS WITH INTERBODY FUSION DEVICE, ANTERIOR APPROACH, ANTERIOR COLUMN, OPEN APPROACH: ICD-10-PCS | Performed by: ORTHOPAEDIC SURGERY

## 2025-07-01 PROCEDURE — 99900035 HC TECH TIME PER 15 MIN (STAT)

## 2025-07-01 PROCEDURE — 27000510 HC BLANKET BAIR HUGGER ANY SIZE: Performed by: ANESTHESIOLOGY

## 2025-07-01 PROCEDURE — 71000039 HC RECOVERY, EACH ADD'L HOUR: Performed by: ORTHOPAEDIC SURGERY

## 2025-07-01 PROCEDURE — 27000165 HC TUBE, ETT CUFFED: Performed by: ANESTHESIOLOGY

## 2025-07-01 PROCEDURE — 63600175 PHARM REV CODE 636 W HCPCS: Performed by: ANESTHESIOLOGY

## 2025-07-01 PROCEDURE — 0RT30ZZ RESECTION OF CERVICAL VERTEBRAL DISC, OPEN APPROACH: ICD-10-PCS | Performed by: ORTHOPAEDIC SURGERY

## 2025-07-01 PROCEDURE — 97161 PT EVAL LOW COMPLEX 20 MIN: CPT

## 2025-07-01 PROCEDURE — 27000716 HC OXISENSOR PROBE, ANY SIZE: Performed by: ANESTHESIOLOGY

## 2025-07-01 PROCEDURE — 71000033 HC RECOVERY, INTIAL HOUR: Performed by: ORTHOPAEDIC SURGERY

## 2025-07-01 PROCEDURE — 82962 GLUCOSE BLOOD TEST: CPT

## 2025-07-01 PROCEDURE — 25000003 PHARM REV CODE 250: Performed by: NURSE ANESTHETIST, CERTIFIED REGISTERED

## 2025-07-01 PROCEDURE — 71000015 HC POSTOP RECOV 1ST HR: Performed by: ORTHOPAEDIC SURGERY

## 2025-07-01 PROCEDURE — 94799 UNLISTED PULMONARY SVC/PX: CPT

## 2025-07-01 PROCEDURE — 11000001 HC ACUTE MED/SURG PRIVATE ROOM

## 2025-07-01 PROCEDURE — 99223 1ST HOSP IP/OBS HIGH 75: CPT | Mod: ,,, | Performed by: FAMILY MEDICINE

## 2025-07-01 PROCEDURE — 00NW0ZZ RELEASE CERVICAL SPINAL CORD, OPEN APPROACH: ICD-10-PCS | Performed by: ORTHOPAEDIC SURGERY

## 2025-07-01 PROCEDURE — 36000710: Performed by: ORTHOPAEDIC SURGERY

## 2025-07-01 PROCEDURE — 01N10ZZ RELEASE CERVICAL NERVE, OPEN APPROACH: ICD-10-PCS | Performed by: ORTHOPAEDIC SURGERY

## 2025-07-01 PROCEDURE — 36000711: Performed by: ORTHOPAEDIC SURGERY

## 2025-07-01 PROCEDURE — C1713 ANCHOR/SCREW BN/BN,TIS/BN: HCPCS | Performed by: ORTHOPAEDIC SURGERY

## 2025-07-01 PROCEDURE — 27000689 HC BLADE LARYNGOSCOPE ANY SIZE: Performed by: ANESTHESIOLOGY

## 2025-07-01 RX ORDER — EPHEDRINE SULFATE 50 MG/ML
INJECTION, SOLUTION INTRAVENOUS
Status: DISCONTINUED | OUTPATIENT
Start: 2025-07-01 | End: 2025-07-01

## 2025-07-01 RX ORDER — MORPHINE SULFATE 10 MG/ML
4 INJECTION INTRAMUSCULAR; INTRAVENOUS; SUBCUTANEOUS EVERY 5 MIN PRN
Status: DISCONTINUED | OUTPATIENT
Start: 2025-07-01 | End: 2025-07-01 | Stop reason: HOSPADM

## 2025-07-01 RX ORDER — ONDANSETRON HYDROCHLORIDE 2 MG/ML
4 INJECTION, SOLUTION INTRAVENOUS EVERY 12 HOURS PRN
Status: DISCONTINUED | OUTPATIENT
Start: 2025-07-01 | End: 2025-07-05 | Stop reason: HOSPADM

## 2025-07-01 RX ORDER — AMOXICILLIN 250 MG
1 CAPSULE ORAL 2 TIMES DAILY
Status: DISCONTINUED | OUTPATIENT
Start: 2025-07-01 | End: 2025-07-05 | Stop reason: HOSPADM

## 2025-07-01 RX ORDER — MEPERIDINE HYDROCHLORIDE 25 MG/ML
25 INJECTION INTRAMUSCULAR; INTRAVENOUS; SUBCUTANEOUS EVERY 10 MIN PRN
Status: DISCONTINUED | OUTPATIENT
Start: 2025-07-01 | End: 2025-07-01 | Stop reason: HOSPADM

## 2025-07-01 RX ORDER — DIPHENHYDRAMINE HYDROCHLORIDE 50 MG/ML
25 INJECTION, SOLUTION INTRAMUSCULAR; INTRAVENOUS EVERY 6 HOURS PRN
Status: DISCONTINUED | OUTPATIENT
Start: 2025-07-01 | End: 2025-07-01 | Stop reason: HOSPADM

## 2025-07-01 RX ORDER — PROPOFOL 10 MG/ML
VIAL (ML) INTRAVENOUS
Status: DISCONTINUED | OUTPATIENT
Start: 2025-07-01 | End: 2025-07-01

## 2025-07-01 RX ORDER — ONDANSETRON HYDROCHLORIDE 2 MG/ML
INJECTION, SOLUTION INTRAVENOUS
Status: DISCONTINUED | OUTPATIENT
Start: 2025-07-01 | End: 2025-07-01

## 2025-07-01 RX ORDER — ESTRADIOL 0.5 MG/1
0.5 TABLET ORAL DAILY
Status: DISCONTINUED | OUTPATIENT
Start: 2025-07-01 | End: 2025-07-05 | Stop reason: HOSPADM

## 2025-07-01 RX ORDER — EZETIMIBE 10 MG/1
10 TABLET ORAL NIGHTLY
Status: DISCONTINUED | OUTPATIENT
Start: 2025-07-01 | End: 2025-07-05 | Stop reason: HOSPADM

## 2025-07-01 RX ORDER — LABETALOL HYDROCHLORIDE 5 MG/ML
7.5 INJECTION, SOLUTION INTRAVENOUS ONCE
Status: COMPLETED | OUTPATIENT
Start: 2025-07-01 | End: 2025-07-01

## 2025-07-01 RX ORDER — CEFAZOLIN 2 G/1
2 INJECTION, POWDER, FOR SOLUTION INTRAMUSCULAR; INTRAVENOUS
Status: COMPLETED | OUTPATIENT
Start: 2025-07-01 | End: 2025-07-01

## 2025-07-01 RX ORDER — DEXAMETHASONE SODIUM PHOSPHATE 4 MG/ML
8 INJECTION, SOLUTION INTRA-ARTICULAR; INTRALESIONAL; INTRAMUSCULAR; INTRAVENOUS; SOFT TISSUE EVERY 6 HOURS
Status: DISCONTINUED | OUTPATIENT
Start: 2025-07-01 | End: 2025-07-01

## 2025-07-01 RX ORDER — CETIRIZINE HYDROCHLORIDE 10 MG/1
10 TABLET ORAL DAILY
Status: DISCONTINUED | OUTPATIENT
Start: 2025-07-01 | End: 2025-07-05 | Stop reason: HOSPADM

## 2025-07-01 RX ORDER — INSULIN ASPART 100 [IU]/ML
1-10 INJECTION, SOLUTION INTRAVENOUS; SUBCUTANEOUS
Status: DISCONTINUED | OUTPATIENT
Start: 2025-07-01 | End: 2025-07-05 | Stop reason: HOSPADM

## 2025-07-01 RX ORDER — ONDANSETRON HYDROCHLORIDE 2 MG/ML
4 INJECTION, SOLUTION INTRAVENOUS DAILY PRN
Status: DISCONTINUED | OUTPATIENT
Start: 2025-07-01 | End: 2025-07-01 | Stop reason: HOSPADM

## 2025-07-01 RX ORDER — GABAPENTIN 400 MG/1
800 CAPSULE ORAL 3 TIMES DAILY
Status: DISCONTINUED | OUTPATIENT
Start: 2025-07-01 | End: 2025-07-05 | Stop reason: HOSPADM

## 2025-07-01 RX ORDER — METHOCARBAMOL 500 MG/1
500 TABLET, FILM COATED ORAL 3 TIMES DAILY
Status: DISCONTINUED | OUTPATIENT
Start: 2025-07-01 | End: 2025-07-05 | Stop reason: HOSPADM

## 2025-07-01 RX ORDER — OXYCODONE HYDROCHLORIDE 5 MG/1
5 TABLET ORAL
Status: DISCONTINUED | OUTPATIENT
Start: 2025-07-01 | End: 2025-07-05 | Stop reason: HOSPADM

## 2025-07-01 RX ORDER — POLYETHYLENE GLYCOL 3350 17 G/17G
17 POWDER, FOR SOLUTION ORAL DAILY
Status: DISCONTINUED | OUTPATIENT
Start: 2025-07-01 | End: 2025-07-05 | Stop reason: HOSPADM

## 2025-07-01 RX ORDER — CELECOXIB 100 MG/1
200 CAPSULE ORAL ONCE
Status: COMPLETED | OUTPATIENT
Start: 2025-07-01 | End: 2025-07-01

## 2025-07-01 RX ORDER — FAMOTIDINE 20 MG/1
20 TABLET, FILM COATED ORAL 2 TIMES DAILY
Status: DISCONTINUED | OUTPATIENT
Start: 2025-07-01 | End: 2025-07-03

## 2025-07-01 RX ORDER — DOCUSATE SODIUM 100 MG/1
100 CAPSULE, LIQUID FILLED ORAL 2 TIMES DAILY
Status: DISCONTINUED | OUTPATIENT
Start: 2025-07-01 | End: 2025-07-05 | Stop reason: HOSPADM

## 2025-07-01 RX ORDER — MUPIROCIN 20 MG/G
OINTMENT TOPICAL 2 TIMES DAILY
Status: DISPENSED | OUTPATIENT
Start: 2025-07-01 | End: 2025-07-03

## 2025-07-01 RX ORDER — DEXAMETHASONE SODIUM PHOSPHATE 4 MG/ML
INJECTION, SOLUTION INTRA-ARTICULAR; INTRALESIONAL; INTRAMUSCULAR; INTRAVENOUS; SOFT TISSUE
Status: DISCONTINUED | OUTPATIENT
Start: 2025-07-01 | End: 2025-07-01

## 2025-07-01 RX ORDER — LIDOCAINE HYDROCHLORIDE 10 MG/ML
1 INJECTION, SOLUTION INFILTRATION; PERINEURAL ONCE AS NEEDED
Status: DISCONTINUED | OUTPATIENT
Start: 2025-07-01 | End: 2025-07-01

## 2025-07-01 RX ORDER — TRANEXAMIC ACID 1 G/10ML
INJECTION, SOLUTION INTRAVENOUS
Status: DISCONTINUED | OUTPATIENT
Start: 2025-07-01 | End: 2025-07-01

## 2025-07-01 RX ORDER — MIDAZOLAM HYDROCHLORIDE 1 MG/ML
INJECTION INTRAMUSCULAR; INTRAVENOUS
Status: DISCONTINUED | OUTPATIENT
Start: 2025-07-01 | End: 2025-07-01

## 2025-07-01 RX ORDER — DEXAMETHASONE SODIUM PHOSPHATE 4 MG/ML
8 INJECTION, SOLUTION INTRA-ARTICULAR; INTRALESIONAL; INTRAMUSCULAR; INTRAVENOUS; SOFT TISSUE
Status: COMPLETED | OUTPATIENT
Start: 2025-07-02 | End: 2025-07-02

## 2025-07-01 RX ORDER — FENTANYL CITRATE 50 UG/ML
INJECTION, SOLUTION INTRAMUSCULAR; INTRAVENOUS
Status: DISCONTINUED | OUTPATIENT
Start: 2025-07-01 | End: 2025-07-01

## 2025-07-01 RX ORDER — SODIUM CHLORIDE 9 MG/ML
INJECTION, SOLUTION INTRAVENOUS CONTINUOUS
Status: DISCONTINUED | OUTPATIENT
Start: 2025-07-01 | End: 2025-07-01

## 2025-07-01 RX ORDER — ACETAMINOPHEN 500 MG
500 TABLET ORAL EVERY 4 HOURS
Status: DISCONTINUED | OUTPATIENT
Start: 2025-07-01 | End: 2025-07-05 | Stop reason: HOSPADM

## 2025-07-01 RX ORDER — GLUCAGON 1 MG
1 KIT INJECTION
Status: DISCONTINUED | OUTPATIENT
Start: 2025-07-01 | End: 2025-07-01 | Stop reason: HOSPADM

## 2025-07-01 RX ORDER — SODIUM CHLORIDE 0.9 % (FLUSH) 0.9 %
10 SYRINGE (ML) INJECTION
Status: DISCONTINUED | OUTPATIENT
Start: 2025-07-01 | End: 2025-07-05 | Stop reason: HOSPADM

## 2025-07-01 RX ORDER — NAPROXEN SODIUM 220 MG/1
81 TABLET, FILM COATED ORAL DAILY
COMMUNITY

## 2025-07-01 RX ORDER — HYDROMORPHONE HYDROCHLORIDE 2 MG/ML
0.5 INJECTION, SOLUTION INTRAMUSCULAR; INTRAVENOUS; SUBCUTANEOUS EVERY 5 MIN PRN
Status: DISCONTINUED | OUTPATIENT
Start: 2025-07-01 | End: 2025-07-01 | Stop reason: HOSPADM

## 2025-07-01 RX ORDER — CLINDAMYCIN PHOSPHATE 900 MG/50ML
INJECTION, SOLUTION INTRAVENOUS
Status: DISCONTINUED | OUTPATIENT
Start: 2025-07-01 | End: 2025-07-01

## 2025-07-01 RX ORDER — SODIUM CHLORIDE 9 MG/ML
INJECTION, SOLUTION INTRAVENOUS
Status: COMPLETED | OUTPATIENT
Start: 2025-07-01 | End: 2025-07-01

## 2025-07-01 RX ORDER — ROCURONIUM BROMIDE 10 MG/ML
INJECTION, SOLUTION INTRAVENOUS
Status: DISCONTINUED | OUTPATIENT
Start: 2025-07-01 | End: 2025-07-01

## 2025-07-01 RX ORDER — OXYCODONE HYDROCHLORIDE 5 MG/1
10 TABLET ORAL EVERY 4 HOURS PRN
Status: DISCONTINUED | OUTPATIENT
Start: 2025-07-01 | End: 2025-07-05 | Stop reason: HOSPADM

## 2025-07-01 RX ORDER — IBUPROFEN 200 MG
24 TABLET ORAL
Status: DISCONTINUED | OUTPATIENT
Start: 2025-07-01 | End: 2025-07-05 | Stop reason: HOSPADM

## 2025-07-01 RX ORDER — CYANOCOBALAMIN (VITAMIN B-12) 500 MCG
400 TABLET ORAL DAILY
Status: DISCONTINUED | OUTPATIENT
Start: 2025-07-01 | End: 2025-07-05 | Stop reason: HOSPADM

## 2025-07-01 RX ORDER — EPINEPHRINE 1 MG/ML
INJECTION INTRAMUSCULAR; INTRAVENOUS; SUBCUTANEOUS
Status: DISCONTINUED | OUTPATIENT
Start: 2025-07-01 | End: 2025-07-01 | Stop reason: HOSPADM

## 2025-07-01 RX ORDER — IBUPROFEN 200 MG
16 TABLET ORAL
Status: DISCONTINUED | OUTPATIENT
Start: 2025-07-01 | End: 2025-07-05 | Stop reason: HOSPADM

## 2025-07-01 RX ORDER — METOCLOPRAMIDE HYDROCHLORIDE 5 MG/ML
5 INJECTION INTRAMUSCULAR; INTRAVENOUS EVERY 6 HOURS PRN
Status: DISCONTINUED | OUTPATIENT
Start: 2025-07-01 | End: 2025-07-05 | Stop reason: HOSPADM

## 2025-07-01 RX ORDER — OXYCODONE HCL 10 MG/1
10 TABLET, FILM COATED, EXTENDED RELEASE ORAL ONCE
Status: COMPLETED | OUTPATIENT
Start: 2025-07-01 | End: 2025-07-01

## 2025-07-01 RX ORDER — MORPHINE SULFATE 4 MG/ML
2 INJECTION, SOLUTION INTRAMUSCULAR; INTRAVENOUS
Status: DISCONTINUED | OUTPATIENT
Start: 2025-07-01 | End: 2025-07-05 | Stop reason: HOSPADM

## 2025-07-01 RX ORDER — GLIPIZIDE 5 MG/1
5 TABLET, FILM COATED, EXTENDED RELEASE ORAL
COMMUNITY

## 2025-07-01 RX ORDER — GABAPENTIN 800 MG/1
800 TABLET ORAL 2 TIMES DAILY
COMMUNITY

## 2025-07-01 RX ORDER — GABAPENTIN 300 MG/1
600 CAPSULE ORAL ONCE
Status: COMPLETED | OUTPATIENT
Start: 2025-07-01 | End: 2025-07-01

## 2025-07-01 RX ORDER — FLUOXETINE 10 MG/1
10 CAPSULE ORAL DAILY
Status: DISCONTINUED | OUTPATIENT
Start: 2025-07-01 | End: 2025-07-05 | Stop reason: HOSPADM

## 2025-07-01 RX ORDER — GLUCAGON 1 MG
1 KIT INJECTION
Status: DISCONTINUED | OUTPATIENT
Start: 2025-07-01 | End: 2025-07-05 | Stop reason: HOSPADM

## 2025-07-01 RX ORDER — LIDOCAINE HYDROCHLORIDE 20 MG/ML
INJECTION, SOLUTION EPIDURAL; INFILTRATION; INTRACAUDAL; PERINEURAL
Status: DISCONTINUED | OUTPATIENT
Start: 2025-07-01 | End: 2025-07-01

## 2025-07-01 RX ORDER — CALCIUM CARBONATE 200(500)MG
500 TABLET,CHEWABLE ORAL DAILY
Status: DISCONTINUED | OUTPATIENT
Start: 2025-07-01 | End: 2025-07-05 | Stop reason: HOSPADM

## 2025-07-01 RX ADMIN — DEXAMETHASONE SODIUM PHOSPHATE 8 MG: 4 INJECTION, SOLUTION INTRA-ARTICULAR; INTRALESIONAL; INTRAMUSCULAR; INTRAVENOUS; SOFT TISSUE at 02:07

## 2025-07-01 RX ADMIN — CELECOXIB 200 MG: 100 CAPSULE ORAL at 06:07

## 2025-07-01 RX ADMIN — ACETAMINOPHEN 500 MG: 500 TABLET ORAL at 09:07

## 2025-07-01 RX ADMIN — CLINDAMYCIN IN 5 PERCENT DEXTROSE 900 MG: 18 INJECTION, SOLUTION INTRAVENOUS at 07:07

## 2025-07-01 RX ADMIN — LIDOCAINE HYDROCHLORIDE 75 MG: 20 INJECTION, SOLUTION EPIDURAL; INFILTRATION; INTRACAUDAL; PERINEURAL at 07:07

## 2025-07-01 RX ADMIN — INSULIN ASPART 10 UNITS: 100 INJECTION, SOLUTION INTRAVENOUS; SUBCUTANEOUS at 05:07

## 2025-07-01 RX ADMIN — TRANEXAMIC ACID 1000 MG: 100 INJECTION INTRAVENOUS at 09:07

## 2025-07-01 RX ADMIN — EZETIMIBE 10 MG: 10 TABLET ORAL at 08:07

## 2025-07-01 RX ADMIN — INSULIN ASPART 3 UNITS: 100 INJECTION, SOLUTION INTRAVENOUS; SUBCUTANEOUS at 09:07

## 2025-07-01 RX ADMIN — DOCUSATE SODIUM 100 MG: 100 CAPSULE, LIQUID FILLED ORAL at 08:07

## 2025-07-01 RX ADMIN — LABETALOL HYDROCHLORIDE 7.5 MG: 5 INJECTION, SOLUTION INTRAVENOUS at 10:07

## 2025-07-01 RX ADMIN — CEFAZOLIN 2 G: 2 INJECTION, POWDER, FOR SOLUTION INTRAMUSCULAR; INTRAVENOUS at 02:07

## 2025-07-01 RX ADMIN — EPHEDRINE SULFATE 50 MG: 50 INJECTION INTRAVENOUS at 07:07

## 2025-07-01 RX ADMIN — OXYCODONE HYDROCHLORIDE 10 MG: 5 TABLET ORAL at 02:07

## 2025-07-01 RX ADMIN — ESTRADIOL 0.5 MG: 0.5 TABLET ORAL at 09:07

## 2025-07-01 RX ADMIN — SODIUM CHLORIDE: 9 INJECTION, SOLUTION INTRAVENOUS at 10:07

## 2025-07-01 RX ADMIN — OXYCODONE HYDROCHLORIDE 10 MG: 10 TABLET, FILM COATED, EXTENDED RELEASE ORAL at 06:07

## 2025-07-01 RX ADMIN — MIDAZOLAM HYDROCHLORIDE 2 MG: 1 INJECTION, SOLUTION INTRAMUSCULAR; INTRAVENOUS at 07:07

## 2025-07-01 RX ADMIN — GABAPENTIN 800 MG: 400 CAPSULE ORAL at 08:07

## 2025-07-01 RX ADMIN — SODIUM CHLORIDE: 9 INJECTION, SOLUTION INTRAVENOUS at 06:07

## 2025-07-01 RX ADMIN — HYDROMORPHONE HYDROCHLORIDE 0.5 MG: 2 INJECTION INTRAMUSCULAR; INTRAVENOUS; SUBCUTANEOUS at 09:07

## 2025-07-01 RX ADMIN — DEXAMETHASONE SODIUM PHOSPHATE 10 MG: 4 INJECTION, SOLUTION INTRA-ARTICULAR; INTRALESIONAL; INTRAMUSCULAR; INTRAVENOUS; SOFT TISSUE at 07:07

## 2025-07-01 RX ADMIN — FAMOTIDINE 20 MG: 20 TABLET, FILM COATED ORAL at 02:07

## 2025-07-01 RX ADMIN — SENNOSIDES AND DOCUSATE SODIUM 1 TABLET: 50; 8.6 TABLET ORAL at 02:07

## 2025-07-01 RX ADMIN — METHOCARBAMOL 500 MG: 500 TABLET ORAL at 08:07

## 2025-07-01 RX ADMIN — PROPOFOL 120 MG: 10 INJECTION, EMULSION INTRAVENOUS at 07:07

## 2025-07-01 RX ADMIN — Medication 400 UNITS: at 02:07

## 2025-07-01 RX ADMIN — ACETAMINOPHEN 500 MG: 500 TABLET ORAL at 02:07

## 2025-07-01 RX ADMIN — METHOCARBAMOL 500 MG: 500 TABLET ORAL at 02:07

## 2025-07-01 RX ADMIN — GABAPENTIN 800 MG: 400 CAPSULE ORAL at 02:07

## 2025-07-01 RX ADMIN — MUPIROCIN: 20 OINTMENT TOPICAL at 08:07

## 2025-07-01 RX ADMIN — FAMOTIDINE 20 MG: 20 TABLET, FILM COATED ORAL at 08:07

## 2025-07-01 RX ADMIN — ONDANSETRON 4 MG: 2 INJECTION INTRAMUSCULAR; INTRAVENOUS at 07:07

## 2025-07-01 RX ADMIN — FLUOXETINE HYDROCHLORIDE 10 MG: 10 CAPSULE ORAL at 02:07

## 2025-07-01 RX ADMIN — SUGAMMADEX 200 MG: 100 INJECTION, SOLUTION INTRAVENOUS at 09:07

## 2025-07-01 RX ADMIN — CALCIUM CARBONATE (ANTACID) CHEW TAB 500 MG 500 MG: 500 CHEW TAB at 02:07

## 2025-07-01 RX ADMIN — DOCUSATE SODIUM 100 MG: 100 CAPSULE, LIQUID FILLED ORAL at 02:07

## 2025-07-01 RX ADMIN — OXYCODONE HYDROCHLORIDE 5 MG: 5 TABLET ORAL at 08:07

## 2025-07-01 RX ADMIN — ACETAMINOPHEN 500 MG: 500 TABLET ORAL at 05:07

## 2025-07-01 RX ADMIN — ROCURONIUM BROMIDE 50 MG: 10 INJECTION, SOLUTION INTRAVENOUS at 07:07

## 2025-07-01 RX ADMIN — CEFAZOLIN 2 G: 2 INJECTION, POWDER, FOR SOLUTION INTRAMUSCULAR; INTRAVENOUS at 08:07

## 2025-07-01 RX ADMIN — GABAPENTIN 600 MG: 300 CAPSULE ORAL at 06:07

## 2025-07-01 RX ADMIN — FENTANYL CITRATE 100 MCG: 50 INJECTION, SOLUTION INTRAMUSCULAR; INTRAVENOUS at 07:07

## 2025-07-01 RX ADMIN — TRANEXAMIC ACID 1000 MG: 100 INJECTION INTRAVENOUS at 07:07

## 2025-07-01 NOTE — HPI
Mrs. Reese is a 75 yo female with t2dm, hld, cervical spondylosis, and cervical disc disease with myelopathy.  She underwent anterior cervical diskectomy and fusion of C3-C5. Tolerated procedure without complications. Hospital medicine has been consulted for med management.  Appropriate home meds have been resumed.

## 2025-07-01 NOTE — PLAN OF CARE
Problem: Occupational Therapy  Goal: Occupational Therapy Goal  Description: ST.Pt will perform bathing with Balwinder with setup at EOB  2.Pt will perform UE dressing with Balwinder  3.Pt will perform LE dressing with Balwinder  4.Pt will transfer bed/chair/bsc with CGA with cane  5.Pt will perform standing task x 2 min with CGA with cane  6.Tolerate 15 min of tx without fatigue.      LTG:   Restore to max I with selfcare and mobility.      Outcome: Progressing

## 2025-07-01 NOTE — H&P
Office: [phone]  Orthopedic Spine Surgery Consult/H&P    ASSESSMENT:  74 y.o. female with cervical myeloradiculopathy    PLAN:  She wants to proceed with surgery for cervical myeloradiculopathy.  This will be a staged procedure with a C3-C5 ACDF followed by C3-C5 laminectomy and fusion    HPI:  74 y.o. female with Reports that last week she was turning her head watching TV and had sudden onset of right upper extremity weakness. Proceeded to the emergency room 6/20/2025 and was discharged home. Went back to the emergency room 6/23/2025 the right wrist pain and swelling. Was evaluated for DVT which was negative. She was referred to our clinic. Reports balance difficulty. Reports prior lumbar complaints for which she sees pain management in [place]. 2 prior lumbar surgeries in the past. Reports difficulty with  strength in the right hand. Decreased standing walking tolerance due to back pain.    Past Medical History:   Diagnosis Date    Diabetes mellitus, type 2     Hyperlipidemia     Neuropathy     Rheumatoid arthritis, unspecified       Past Surgical History:   Procedure Laterality Date    DEBRIDEMENT OF LOWER EXTREMITY Left 03/28/2022    Procedure: DEBRIDEMENT, LOWER EXTREMITY;  Surgeon: Kobi Jacome DO;  Location: [facility] OR;  Service: General;  Laterality: Left;    TOE AMPUTATION Left 03/28/2022    Procedure: AMPUTATION, TOE;  Surgeon: Kobi Jacome DO;  Location: [facility] OR;  Service: General;  Laterality: Left;  possible left 2nd toe amp      Review of patient's allergies indicates:   Allergen Reactions    Augmentin [amoxicillin-pot clavulanate] Itching and Rash      Current Medications[1]     Review of systems:  Denies chest pain, nausea,vomiting, abdominal pain, cough, runny nose, eye pain, ear pain, fevers, chills, weight loss, weight gain, dysuria, hematuria, changes in mood    IMAGING:  MRI cervical spine at [facility] [place] 6/25/2025 reviewed shows:  Concern for ossification posterior  longitudinal ligament  At C3-4 there is severe central stenosis. Severe bilateral foraminal stenosis  At C4-5 there is moderate central stenosis. Severe bilateral foraminal stenosis  At C5-6 there is severe left greater than right foraminal stenosis  At C6-7 there is severe left foraminal stenosis    There were no vitals filed for this visit.     EXAM:  Constitutional  General Appearance:  There is no height or weight on file to calculate BMI., NAD  Spine exam:    Motor  C5 C6 C7 C8 T1  L2 L3 L4 L5 S1      Left  5 5 5 5 5  5 5 5 5 5      Right  3 3 2 1 1  5 5 5 5 5   Sensory                 Left  + + + + +  + + + + +     Right + + + + +  + + + + +     Babinski: downgoing  Hoffmans: negative  Clonus: none    Reflexes:   Bicep 2+ left/right  Tricep 2+ left/right  BR 2+ left/right  Patella 2+ left/right  Achilles 2+ left/right        [1]   Current Facility-Administered Medications:     0.9% NaCl infusion, , Intravenous, Continuous, Wiley Alexander MD    celecoxib capsule 200 mg, 200 mg, Oral, Once, Wiley Alexander MD    gabapentin capsule 600 mg, 600 mg, Oral, Once, Wiley Alexander MD    LIDOcaine HCL 10 mg/ml (1%) injection 1 mL, 1 mL, Intradermal, Once PRN, Wiley Alexander MD    oxyCODONE 12 hr tablet 10 mg, 10 mg, Oral, Once, Wiley Alexander MD

## 2025-07-01 NOTE — PT/OT/SLP EVAL
Occupational Therapy   Evaluation    Name: Nicol Reese  MRN: [id number]  Admitting Diagnosis: Cervical disc disease with myelopathy  Recent Surgery: Procedure(s) (LRB):  DISCECTOMY, SPINE, CERVICAL, ANTERIOR, W/ ARTHRODESIS (N/A) * Day of Surgery *    Recommendations:     Discharge Recommendations: Moderate Intensity Therapy  Discharge Equipment Recommendations:  to be determined by next level of care  Barriers to discharge:  None    Assessment:     Nicol Reese is a 74 y.o. female with a medical diagnosis of Cervical disc disease with myelopathy.  She presents with s/p anterior cervical discectomy with arthrodesis. Performance deficits affecting function: weakness, impaired endurance, impaired self care skills, impaired functional mobility, gait instability, impaired balance, decreased upper extremity function.      Rehab Prognosis: Good; patient would benefit from acute skilled OT services to address these deficits and reach maximum level of function.       Plan:     Patient to be seen 5 x/week to address the above listed problems via self-care/home management, therapeutic activities, therapeutic exercises  Plan of Care Expires: 07/29/25  Plan of Care Reviewed with: patient    Subjective     Chief Complaint: cervical disc disease with myelopathy  Patient/Family Comments/goals: pt agreeable to OT eval    Occupational Profile:  Living Environment: pt lives alone in one story home with 3 steps to enter with handrails  Previous level of function: independent with all ADL tasks and utilized cane for functional mobility as needed  Roles and Routines: perform self care  Equipment Used at Home: cane, straight  Assistance upon Discharge: swing bed    Pain/Comfort:  Pain Rating 1: 3/10  Location - Orientation 1: posterior  Location 1: shoulder  Pain Addressed 1: Reposition, Cessation of Activity    Patients cultural, spiritual, Buddhism conflicts given the current situation: no    Objective:     Communicated with:  YVETTE Sood prior to session.  Patient found HOB elevated with cervical collar, blood pressure cuff, peripheral IV, pulse ox (continuous), SCD upon OT entry to room.    General Precautions: Standard, fall  Orthopedic Precautions: spinal precautions  Braces: Cervical collar  Respiratory Status: Room air    Occupational Performance:    Bed Mobility:    Patient completed Supine to Sit with contact guard assistance  Patient completed Sit to Supine with contact guard assistance    Functional Mobility/Transfers:  Patient completed Sit <> Stand Transfer with contact guard assistance  with  hand-held assist   Functional Mobility: pt performed functional mobility to door and back to bed with CGA to Balwinder with hand held assist    Activities of Daily Living:  Lower Body Dressing: maximal assistance to leana socks    Cognitive/Visual Perceptual:  Cognitive/Psychosocial Skills:     -       Oriented to: Person, Place, Time, and Situation   -       Follows Commands/attention:Follows multistep  commands  -       Mood/Affect/Coping skills/emotional control: Cooperative    Physical Exam:  Balance:    -       sitting balance SBA; standing balance CGA  Upper Extremity Range of Motion:     -       Right Upper Extremity: WFL  -       Left Upper Extremity: WFL  Upper Extremity Strength:    -       Right Upper Extremity: 2/5  -       Left Upper Extremity: WFL  Gross motor coordination:   impaired d/t RUE weakness    AMPAC 6 Click ADL:  AMPAC Total Score: 20    Treatment & Education:  Pt educated on OT role/POC.   Importance of OOB activity with staff assistance.  Importance of sitting up in the chair throughout the day as tolerated, especially for meals   Safety during functional t/f and mobility with use of cane  Importance of assisting with self-care activities   All questions/concerns answered within OT scope of practice      Patient left HOB elevated with all lines intact, call button in reach, and daughter present    GOALS:    Multidisciplinary Problems       Occupational Therapy Goals          Problem: Occupational Therapy    Goal Priority Disciplines Outcome Interventions   Occupational Therapy Goal     OT, PT/OT Progressing    Description: ST.Pt will perform bathing with Balwinder with setup at EOB  2.Pt will perform UE dressing with Balwinder  3.Pt will perform LE dressing with Balwinder  4.Pt will transfer bed/chair/bsc with CGA with cane  5.Pt will perform standing task x 2 min with CGA with cane  6.Tolerate 15 min of tx without fatigue.      LTG:   Restore to max I with selfcare and mobility.                           DME Justifications:  No DME recommended requiring DME justifications    History:     Past Medical History:   Diagnosis Date    Diabetes mellitus, type 2     Hyperlipidemia     Neuropathy     Rheumatoid arthritis, unspecified          Past Surgical History:   Procedure Laterality Date    DEBRIDEMENT OF LOWER EXTREMITY Left 2022    Procedure: DEBRIDEMENT, LOWER EXTREMITY;  Surgeon: Kobi Jacome DO;  Location: [facility] OR;  Service: General;  Laterality: Left;    TOE AMPUTATION Left 2022    Procedure: AMPUTATION, TOE;  Surgeon: Kobi Jacome DO;  Location: [facility] OR;  Service: General;  Laterality: Left;  possible left 2nd toe amp       Time Tracking:     OT Date of Treatment: 25  OT Start Time: 1424  OT Stop Time: 1442  OT Total Time (min): 18 min    Billable Minutes:Evaluation OT min complexity eval    2025

## 2025-07-01 NOTE — ANESTHESIA PREPROCEDURE EVALUATION
07/01/2025  Nicol Reese is a 74 y.o., female.      Pre-op Assessment    I have reviewed the Patient Summary Reports.    I have reviewed the NPO Status.   I have reviewed the Medications.     Review of Systems         Anesthesia Plan  Type of Anesthesia, risks & benefits discussed:    Anesthesia Type: Gen ETT  Intra-op Monitoring Plan: Standard ASA Monitors  Post Op Pain Control Plan: IV/PO Opioids PRN  Induction:  IV  Informed Consent: Informed consent signed with the Patient and all parties understand the risks and agree with anesthesia plan.  All questions answered.   ASA Score: 2    Ready For Surgery From Anesthesia Perspective.     .  NPO greater than 8 hours  No anesthetic complications  Allergies      Augmentin [Amoxicillin-pot Clavulanate]          IDDM  Dyspnea on exertion    MP II; dentures; adequate ROM at neck

## 2025-07-01 NOTE — SUBJECTIVE & OBJECTIVE
Past Medical History:   Diagnosis Date    Diabetes mellitus, type 2     Hyperlipidemia     Neuropathy     Rheumatoid arthritis, unspecified        Past Surgical History:   Procedure Laterality Date    DEBRIDEMENT OF LOWER EXTREMITY Left 03/28/2022    Procedure: DEBRIDEMENT, LOWER EXTREMITY;  Surgeon: Kobi Jacome DO;  Location: [facility] OR;  Service: General;  Laterality: Left;    TOE AMPUTATION Left 03/28/2022    Procedure: AMPUTATION, TOE;  Surgeon: Kobi Jacome DO;  Location: [facility] OR;  Service: General;  Laterality: Left;  possible left 2nd toe amp       Review of patient's allergies indicates:   Allergen Reactions    Augmentin [amoxicillin-pot clavulanate] Itching and Rash       No current facility-administered medications on file prior to encounter.     Current Outpatient Medications on File Prior to Encounter   Medication Sig    aspirin 81 MG Chew Take 81 mg by mouth once daily.    calcium carbonate (OS-KUSH) 600 mg calcium (1,500 mg) Tab Take 600 mg by mouth once daily.    cholecalciferol, vitamin D3, (VITAMIN D3) 10 mcg (400 unit) Tab Take 400 Units by mouth once daily.    cilostazoL (PLETAL) 100 MG Tab Take 100 mg by mouth 2 (two) times daily.    diclofenac-misoprostol  mg-mcg (ARTHROTEC 75)  mg-mcg per tablet Take 1 tablet by mouth 2 (two) times daily.    estradioL (ESTRACE) 0.5 MG tablet Take 0.5 mg by mouth once daily.    FLUoxetine 10 MG capsule Take 10 mg by mouth once daily.    glipiZIDE 5 MG TR24 Take 5 mg by mouth daily with breakfast.    metFORMIN (GLUCOPHAGE) 1000 MG tablet Take 1 tablet (1,000 mg total) by mouth 2 (two) times daily with meals.    [DISCONTINUED] empagliflozin (JARDIANCE) 25 mg tablet Take 1 tablet (25 mg total) by mouth once daily.    ezetimibe (ZETIA) 10 mg tablet Take 1 tablet (10 mg total) by mouth every evening. Take one tablet by mouth daily    fenofibrate (TRICOR) 145 MG tablet Take 145 mg by mouth once daily.    gabapentin (NEURONTIN) 600 MG tablet  Take 600 mg by mouth 2 (two) times daily.    gabapentin (NEURONTIN) 800 MG tablet Take 800 mg by mouth 2 (two) times daily.    HYDROcodone-acetaminophen (NORCO) 5-325 mg per tablet Take 1 tablet by mouth daily as needed.    insulin (LANTUS SOLOSTAR U-100 INSULIN) glargine 100 units/mL SubQ pen Inject 30 units sq once daily    isosorbide mononitrate (IMDUR) 30 MG 24 hr tablet Take 1 tablet (30 mg total) by mouth once daily.    loratadine (CLARITIN) 10 mg tablet Take 10 mg by mouth once daily.    methocarbamoL (ROBAXIN) 500 MG Tab Take one tablet by mouth every 6 hours as needed    TRULICITY 3 mg/0.5 mL pen injector Inject 3 mg into the skin every 7 days.     Family History       Problem Relation (Age of Onset)    Alzheimer's disease Father    Arthritis Sister, Sister    Breast cancer Mother, Sister    Cancer Mother, Sister    Dementia Brother    Diabetes Sister, Sister, Sister, Brother, Brother, Brother, Brother    Hypertension Brother    No Known Problems Brother    Seizures Brother    Thyroid disease Sister          Tobacco Use    Smoking status: Never    Smokeless tobacco: Never   Substance and Sexual Activity    Alcohol use: Not Currently    Drug use: Not Currently    Sexual activity: Not Currently     Review of Systems   Constitutional:  Negative for chills, fatigue and fever.   HENT:  Negative for trouble swallowing.    Eyes:  Negative for visual disturbance.   Respiratory:  Negative for cough, shortness of breath and wheezing.    Cardiovascular:  Negative for chest pain and palpitations.   Gastrointestinal:  Negative for abdominal distention, abdominal pain, diarrhea, nausea and vomiting.   Genitourinary:  Negative for difficulty urinating and hematuria.   Skin:  Negative for color change.   Neurological:  Negative for headaches.   Psychiatric/Behavioral:  Negative for agitation and hallucinations.      Objective:     Vital Signs (Most Recent):  Temp: 97.9 °F (36.6 °C) (07/01/25 0940)  Pulse: 86 (07/01/25  1351)  Resp: 18 (07/01/25 1401)  BP: (!) 161/76 (07/01/25 1407)  SpO2: 100 % (07/01/25 1330) Vital Signs (24h Range):  Temp:  [97.9 °F (36.6 °C)-98.1 °F (36.7 °C)] 97.9 °F (36.6 °C)  Pulse:  [] 86  Resp:  [13-18] 18  SpO2:  [77 %-100 %] 100 %  BP: (108-179)/(56-99) 161/76     Weight: 59.4 kg (131 lb)  Body mass index is 21.14 kg/m².     Physical Exam  Constitutional:       Appearance: Normal appearance.   HENT:      Head: Normocephalic and atraumatic.      Nose: Nose normal.   Eyes:      Extraocular Movements: Extraocular movements intact.      Pupils: Pupils are equal, round, and reactive to light.   Neck:      Comments: Cervical collar in place.  Cardiovascular:      Rate and Rhythm: Normal rate and regular rhythm.   Pulmonary:      Effort: Pulmonary effort is normal.      Breath sounds: Normal breath sounds.   Abdominal:      General: Bowel sounds are normal.      Palpations: Abdomen is soft.   Skin:     General: Skin is warm and dry.   Neurological:      General: No focal deficit present.      Mental Status: She is alert and oriented to person, place, and time.   Psychiatric:         Mood and Affect: Mood normal.         Behavior: Behavior normal.          Significant Labs: All pertinent labs within the past 24 hours have been reviewed.    Significant Imaging: I have reviewed all pertinent imaging results/findings within the past 24 hours.

## 2025-07-01 NOTE — TRANSFER OF CARE
"Anesthesia Transfer of Care Note    Patient: Nicol Reese    Procedure(s) Performed: Procedure(s) (LRB):  DISCECTOMY, SPINE, CERVICAL, ANTERIOR, W/ ARTHRODESIS (N/A)    Patient location: PACU    Anesthesia Type: general    Transport from OR: Transported from OR on room air with adequate spontaneous ventilation    Post pain: adequate analgesia    Post assessment: no apparent anesthetic complications    Post vital signs: stable    Level of consciousness: lethargic    Nausea/Vomiting: no nausea/vomiting    Complications: none    Transfer of care protocol was followed      Last vitals: Visit Vitals  BP (!) 179/99   Pulse 98   Temp 36.6 °C (97.9 °F)   Resp 17   Ht 5' 6" (1.676 m)   Wt 59.4 kg (131 lb)   SpO2 (!) 93%   Breastfeeding No   BMI 21.14 kg/m²     "

## 2025-07-01 NOTE — OR NURSING
Patient arrived from OR via stretcher. No distress. Connected to monitors, vss. Respirations even et unlabored on room air. Drowsy, but arouses upon calling. IV intact with fluids infusing to gravity. Dressing to anterior neck that is clean, dry and intact. Foam C-collar in place. SCD's intact. No needs at this time. Safety measures in place.     0945 patient rates pain to posterior neck 8/10. PRN pain meds given.     1030 patient BP elevated. Contacted Dr. Cooper and rec'd orders for dose of 7.5mg labetolol IV; meds given. BP& HR improved and meets criteria to leave pacu.     1103 patient transported back to outpatient room 5 awaiting bed placement. Report given to YVETTE Peraza. /80, HR83, RR14, 97% on room air. Family at bedside.

## 2025-07-01 NOTE — ASSESSMENT & PLAN NOTE
"Patient's FSGs are uncontrolled due to hyperglycemia on current medication regimen.  Last A1c reviewed-   Lab Results   Component Value Date    HGBA1C 9 (H) 05/27/2025     Most recent fingerstick glucose reviewed- No results for input(s): "POCTGLUCOSE" in the last 24 hours.  Current correctional scale  Medium  Maintain anti-hyperglycemic dose as follows-   Antihyperglycemics (From admission, onward)      Start     Stop Route Frequency Ordered    07/01/25 1110  insulin aspart U-100 injection 1-10 Units         -- SubQ Before meals & nightly PRN 07/01/25 1111          Hold Oral hypoglycemics while patient is in the hospital.  "

## 2025-07-01 NOTE — OP NOTE
[facility]  Surgery Department  Operative Note    SUMMARY     Date of Procedure: 7/1/2025     Procedure: Procedure(s) (LRB):  DISCECTOMY, SPINE, CERVICAL, ANTERIOR, W/ ARTHRODESIS (N/A)     Surgeons and Role:     * Wiley Alexander MD - Primary    Assistant: MARILOU Polk RNFA; no qualified resident or fellow was available to assist with this case    Pre-Operative Diagnosis: Cervical spondylosis [M47.812]    Post-Operative Diagnosis: Post-Op Diagnosis Codes:     * Cervical spondylosis [M47.812]     * Cervical disc disease with myelopathy [M50.00]    Anesthesia: General    Technical Procedures Used:   1. Anterior cervical diskectomy and fusion C3-C5, 69659, 22551  2. Anterior segmental instrumentation C3-C5, 22845  3. Insertion of biomechanical devices (interbody cage), 22853  4. Placement and removal of Martin-Wells tongs, 20660  5. Use of allograft for spine surgery, 20930    This is stage I of a planned 2 stage procedure    Description of the Findings of the Procedure:   Indications:  This is a 74 y.o. female with cervical myeloradiculopathy.  They failed attempted conservative measures.  Risks, benefits, and alternatives were discussed with the patient and they elected to proceed with surgery.    Procedure in detail:  The patient was identified in the preoperative holding area and the surgical site was marked. They were then taken back to the operating room where general endotracheal anesthesia was induced.  Martin-Wells tongs were applied using the usual sterile technique.  They were then positioned in the supine position on the OR table with the arms tucked to the side and the shoulders taped in the depressed position.  15 lb of traction were applied to the Martin-Wells tongs.  Care was taken to ensure proper padding of all bony prominences. The anterior cervical spine was prepped and draped in the normal sterile fashion. A timeout was performed. The incision site was  localized with intraoperative fluoroscopy. After the incision was marked out a solution of epinephrine was injected in the skin and subcutaneous tissues. A left-sided anterior cervical incision was made. This was carried down to the platysma muscle. A plane was developed over the platysma. The platysma was divided in line with the incision with use of electrocautery. A plane was developed beneath the platysma. The sternocleidomastoid muscle was identified and released from its fascia medially. A plane was developed medial to sternocleidomastoid and the carotid sheath and lateral to the trachea and esophagus exposing the anterior cervical spine. The prevertebral fascia was divided. The anterior cervical spine was exposed and a hemostat was used to grasp the fibers of the anterior longitudinal ligament overlying the suspected disc. Lateral fluoroscopic imaging was used to confirm the appropriate level for surgery.    The C3-4 disc space was addressed 1st.  Anterior osteophytes were removed with the use of Leksell rongeur. An annulotomy was performed with the use of a 15 blade scalpel. The disc material was debrided with a pituitary rongeur. A complete diskectomy was performed. The Gueydan distraction pins were then inserted. Endplate preparation was performed with use of curettes. Posterior osteophytes were removed with a high-speed bur. A plane was developed under the posterior longitudinal ligament with a microcurette. The posterior longitudinal ligament was then resected with use of Kerrison punch. The decompression was carried out into the foramen bilaterally with the Kerrison punch.  The central canal and neuroforamen bilaterally were observed to be adequately decompressed.  Trials were used to determine the appropriate size implant.  This was filled with Zavation allograft and inserted under fluoroscopic guidance.  A plate was inserted along with the cage.  Two screws were placed through the plate, 1 each into  the superior and inferior vertebral bodies.  This process was repeated at the C4-5 level.  10 lb of traction removed from the Martin-Wells tongs.  Final implant placement and spinal alignment was verified on fluoroscopic imaging and found to be satisfactory.    The wound was copiously irrigated with normal saline. The wound was explored for any persistent bleeders and none were found, good hemostasis had been achieved.  The platysma layer was closed with a running Stratafix #0 suture. A running subcuticular layer was performed with 2-0 Stratafix suture. Dermabond prineo was applied to cover the incision. A sterile dressing of gauze and Tegaderm was then applied.  The remaining traction was removed from the Martin-Wells tongs.  The tongs were removed without incident.  A three-inch soft foam collar was applied to the neck.    The patient was then extubated and awakened and taken to recovery in good condition having suffered no untoward event.      Complications: No    Estimated Blood Loss (EBL): 30 mL           Implants:  Zavation anterior cervical cages, plates and screws, allograft  Implant Name Type Inv. Item Serial No.  Lot No. LRB No. Used Action   LOG [id number] - ACDF DSS (SKYLINE) TRAY 2 OF 3 - 001 - 1               Specimens:   Specimen (24h ago, onward)      None                    Condition: Good    Disposition: PACU - hemodynamically stable.    Attestation: I was present and scrubbed for the entire procedure.

## 2025-07-01 NOTE — ASSESSMENT & PLAN NOTE
S/p anterior cervical diskectomy with fusion of C3-C5  Ortho spine managing.  PRN analgesics.  PT/OT.

## 2025-07-01 NOTE — PLAN OF CARE
Problem: Physical Therapy  Goal: Physical Therapy Goal  Description: Short term goals:  . Supine to sit with Stand-by Assistance  . Sit to supine with Stand-by Assistance  . Sit to stand transfer with Stand-by Assistance  . Gait  x 150 feet with Contact Guard Assistance using Single-point Cane .     Long term goals:  Patient will gain highest level functional mobility with lowest level of assistive device to return to desired living arrangement and prior ADL's.     Outcome: Progressing

## 2025-07-01 NOTE — OP NOTE
Certification of Assistant at Surgery       Surgery Date: 7/1/2025     Participating Surgeons:  Surgeons and Role:     * Wiley Alexander MD - Primary    Procedures:  Procedure(s) (LRB):  DISCECTOMY, SPINE, CERVICAL, ANTERIOR, W/ ARTHRODESIS (N/A)    Assistant Surgeon's Certification of Necessity:  I understand that section 1842 (b) (6) (d) of the Social Security Act generally prohibits Medicare Part B reasonable charge payment for the services of assistants at surgery in teaching hospitals when qualified residents are available to furnish such services. I certify that the services for which payment is claimed were medically necessary, and that no qualified resident was available to perform the services. I further understand that these services are subject to post-payment review by the Medicare carrier.      Rajani Early NP    07/01/2025  9:37 AM

## 2025-07-01 NOTE — ASSESSMENT & PLAN NOTE
S/p anterior cervical diskectomy with fusion of C3-C5  Ortho spine managing.  PRN analgesics.  PT/OT

## 2025-07-01 NOTE — ANESTHESIA POSTPROCEDURE EVALUATION
Anesthesia Post Evaluation    Patient: Nicol Reese    Procedure(s) Performed: Procedure(s) (LRB):  DISCECTOMY, SPINE, CERVICAL, ANTERIOR, W/ ARTHRODESIS (N/A)    Final Anesthesia Type: general      Patient location during evaluation: PACU  Post-procedure vital signs: reviewed and stable  Pain management: adequate  Airway patency: patent    PONV status at discharge: No PONV  Anesthetic complications: no      Cardiovascular status: hemodynamically stable  Respiratory status: unassisted  Hydration status: euvolemic  Follow-up not needed.              Vitals Value Taken Time   /78 07/01/25 12:15   Temp 36.6 °C (97.9 °F) 07/01/25 09:40   Pulse 87 07/01/25 12:28   Resp 11 07/01/25 10:55   SpO2 98 % 07/01/25 12:28   Vitals shown include unfiled device data.      Event Time   Out of Recovery 11:03:00         Pain/Aranza Score: Pain Rating Prior to Med Admin: 7 (7/1/2025  2:01 PM)  Aranza Score: 10 (7/1/2025 11:03 AM)

## 2025-07-01 NOTE — CONSULTS
[facility]  [facility] Medicine  Consult Note    Patient Name: Nicol Reese  MRN: [id number]  Admission Date: 7/1/2025  Hospital Length of Stay: 0 days  Attending Physician: Wiley Alexander MD   Primary Care Provider: Ga Mazariegos MD           Patient information was obtained from patient, past medical records, and primary team.     Consults  Subjective:     Principal Problem: Cervical disc disease with myelopathy    Chief Complaint: No chief complaint on file.       HPI: Mrs. Reese is a 73 yo female with t2dm, hld, cervical spondylosis, and cervical disc disease with myelopathy.  She underwent anterior cervical diskectomy and fusion of C3-C5. Tolerated procedure without complications. Hospital medicine has been consulted for med management.  Appropriate home meds have been resumed.    Past Medical History:   Diagnosis Date    Diabetes mellitus, type 2     Hyperlipidemia     Neuropathy     Rheumatoid arthritis, unspecified        Past Surgical History:   Procedure Laterality Date    DEBRIDEMENT OF LOWER EXTREMITY Left 03/28/2022    Procedure: DEBRIDEMENT, LOWER EXTREMITY;  Surgeon: Kobi Jacome DO;  Location: [facility] OR;  Service: General;  Laterality: Left;    TOE AMPUTATION Left 03/28/2022    Procedure: AMPUTATION, TOE;  Surgeon: Kobi Jacome DO;  Location: [facility] OR;  Service: General;  Laterality: Left;  possible left 2nd toe amp       Review of patient's allergies indicates:   Allergen Reactions    Augmentin [amoxicillin-pot clavulanate] Itching and Rash       No current facility-administered medications on file prior to encounter.     Current Outpatient Medications on File Prior to Encounter   Medication Sig    aspirin 81 MG Chew Take 81 mg by mouth once daily.    calcium carbonate (OS-KUSH) 600 mg calcium (1,500 mg) Tab Take 600 mg by mouth once daily.    cholecalciferol, vitamin D3, (VITAMIN D3) 10 mcg (400 unit) Tab Take 400 Units by mouth once daily.    cilostazoL  (PLETAL) 100 MG Tab Take 100 mg by mouth 2 (two) times daily.    diclofenac-misoprostol  mg-mcg (ARTHROTEC 75)  mg-mcg per tablet Take 1 tablet by mouth 2 (two) times daily.    estradioL (ESTRACE) 0.5 MG tablet Take 0.5 mg by mouth once daily.    FLUoxetine 10 MG capsule Take 10 mg by mouth once daily.    glipiZIDE 5 MG TR24 Take 5 mg by mouth daily with breakfast.    metFORMIN (GLUCOPHAGE) 1000 MG tablet Take 1 tablet (1,000 mg total) by mouth 2 (two) times daily with meals.    [DISCONTINUED] empagliflozin (JARDIANCE) 25 mg tablet Take 1 tablet (25 mg total) by mouth once daily.    ezetimibe (ZETIA) 10 mg tablet Take 1 tablet (10 mg total) by mouth every evening. Take one tablet by mouth daily    fenofibrate (TRICOR) 145 MG tablet Take 145 mg by mouth once daily.    gabapentin (NEURONTIN) 600 MG tablet Take 600 mg by mouth 2 (two) times daily.    gabapentin (NEURONTIN) 800 MG tablet Take 800 mg by mouth 2 (two) times daily.    HYDROcodone-acetaminophen (NORCO) 5-325 mg per tablet Take 1 tablet by mouth daily as needed.    insulin (LANTUS SOLOSTAR U-100 INSULIN) glargine 100 units/mL SubQ pen Inject 30 units sq once daily    isosorbide mononitrate (IMDUR) 30 MG 24 hr tablet Take 1 tablet (30 mg total) by mouth once daily.    loratadine (CLARITIN) 10 mg tablet Take 10 mg by mouth once daily.    methocarbamoL (ROBAXIN) 500 MG Tab Take one tablet by mouth every 6 hours as needed    TRULICITY 3 mg/0.5 mL pen injector Inject 3 mg into the skin every 7 days.     Family History       Problem Relation (Age of Onset)    Alzheimer's disease Father    Arthritis Sister, Sister    Breast cancer Mother, Sister    Cancer Mother, Sister    Dementia Brother    Diabetes Sister, Sister, Sister, Brother, Brother, Brother, Brother    Hypertension Brother    No Known Problems Brother    Seizures Brother    Thyroid disease Sister          Tobacco Use    Smoking status: Never    Smokeless tobacco: Never   Substance and Sexual  Activity    Alcohol use: Not Currently    Drug use: Not Currently    Sexual activity: Not Currently     Review of Systems   Constitutional:  Negative for chills, fatigue and fever.   HENT:  Negative for trouble swallowing.    Eyes:  Negative for visual disturbance.   Respiratory:  Negative for cough, shortness of breath and wheezing.    Cardiovascular:  Negative for chest pain and palpitations.   Gastrointestinal:  Negative for abdominal distention, abdominal pain, diarrhea, nausea and vomiting.   Genitourinary:  Negative for difficulty urinating and hematuria.   Skin:  Negative for color change.   Neurological:  Negative for headaches.   Psychiatric/Behavioral:  Negative for agitation and hallucinations.      Objective:     Vital Signs (Most Recent):  Temp: 97.9 °F (36.6 °C) (07/01/25 0940)  Pulse: 86 (07/01/25 1351)  Resp: 18 (07/01/25 1401)  BP: (!) 161/76 (07/01/25 1407)  SpO2: 100 % (07/01/25 1330) Vital Signs (24h Range):  Temp:  [97.9 °F (36.6 °C)-98.1 °F (36.7 °C)] 97.9 °F (36.6 °C)  Pulse:  [] 86  Resp:  [13-18] 18  SpO2:  [77 %-100 %] 100 %  BP: (108-179)/(56-99) 161/76     Weight: 59.4 kg (131 lb)  Body mass index is 21.14 kg/m².     Physical Exam  Constitutional:       Appearance: Normal appearance.   HENT:      Head: Normocephalic and atraumatic.      Nose: Nose normal.   Eyes:      Extraocular Movements: Extraocular movements intact.      Pupils: Pupils are equal, round, and reactive to light.   Neck:      Comments: Cervical collar in place.  Cardiovascular:      Rate and Rhythm: Normal rate and regular rhythm.   Pulmonary:      Effort: Pulmonary effort is normal.      Breath sounds: Normal breath sounds.   Abdominal:      General: Bowel sounds are normal.      Palpations: Abdomen is soft.   Skin:     General: Skin is warm and dry.   Neurological:      General: No focal deficit present.      Mental Status: She is alert and oriented to person, place, and time.   Psychiatric:         Mood and  "Affect: Mood normal.         Behavior: Behavior normal.          Significant Labs: All pertinent labs within the past 24 hours have been reviewed.    Significant Imaging: I have reviewed all pertinent imaging results/findings within the past 24 hours.  Assessment/Plan:     * Cervical disc disease with myelopathy  S/p anterior cervical diskectomy with fusion of C3-C5  Ortho spine managing.  PRN analgesics.  PT/OT.      Cervical spondylosis  S/p anterior cervical diskectomy with fusion of C3-C5  Ortho spine managing.  PRN analgesics.  PT/OT    Hyperlipidemia  Continue ezetimibe.    Diabetes mellitus, type 2  Patient's FSGs are uncontrolled due to hyperglycemia on current medication regimen.  Last A1c reviewed-   Lab Results   Component Value Date    HGBA1C 9 (H) 05/27/2025     Most recent fingerstick glucose reviewed- No results for input(s): "POCTGLUCOSE" in the last 24 hours.  Current correctional scale  Medium  Maintain anti-hyperglycemic dose as follows-   Antihyperglycemics (From admission, onward)      Start     Stop Route Frequency Ordered    07/01/25 1110  insulin aspart U-100 injection 1-10 Units         -- SubQ Before meals & nightly PRN 07/01/25 1111          Hold Oral hypoglycemics while patient is in the hospital.      VTE Risk Mitigation (From admission, onward)           Ordered     Place MARIANA hose  Until discontinued         07/01/25 1109     Place sequential compression device  Until discontinued         07/01/25 1109                        Thank you for your consult. I will follow-up with patient. Please contact us if you have any additional questions.    Zoe Armas, DO  Department of Hospital Medicine   [facility]      "

## 2025-07-01 NOTE — ANESTHESIA PROCEDURE NOTES
Intubation    Date/Time: 7/1/2025 7:37 AM    Performed by: Eleazar Stephenson CRNA  Authorized by: Donis Cooper MD    Intubation:     Induction:  Intravenous    Intubated:  Postinduction    Mask Ventilation:  Easy mask    Attempts:  1    Attempted By:  CRNA    Method of Intubation:  Video laryngoscopy    Blade:  Glidescope 3    Laryngeal View Grade: Grade I - full view of cords      Difficult Airway Encountered?: No      Complications:  None    Airway Device:  Oral endotracheal tube    Airway Device Size:  7.0    Style/Cuff Inflation:  Cuffed (inflated to minimal occlusive pressure)    Inflation Amount (mL):  8    Tube secured:  19    Placement Verified By:  Capnometry    Complicating Factors:  None    Findings Post-Intubation:  BS equal bilateral and atraumatic/condition of teeth unchanged

## 2025-07-02 ENCOUNTER — ANESTHESIA EVENT (OUTPATIENT)
Dept: SURGERY | Facility: HOSPITAL | Age: 75
End: 2025-07-02
Payer: MEDICARE

## 2025-07-02 ENCOUNTER — ANESTHESIA (OUTPATIENT)
Dept: SURGERY | Facility: HOSPITAL | Age: 75
End: 2025-07-02
Payer: MEDICARE

## 2025-07-02 PROBLEM — I10 HYPERTENSION: Chronic | Status: ACTIVE | Noted: 2025-07-02

## 2025-07-02 LAB
ANION GAP SERPL CALCULATED.3IONS-SCNC: 12 MMOL/L (ref 7–16)
BASOPHILS # BLD AUTO: 0.01 K/UL (ref 0–0.2)
BASOPHILS NFR BLD AUTO: 0.2 % (ref 0–1)
BUN SERPL-MCNC: 20 MG/DL (ref 10–20)
BUN/CREAT SERPL: 25 (ref 6–20)
CALCIUM SERPL-MCNC: 8.8 MG/DL (ref 8.4–10.2)
CHLORIDE SERPL-SCNC: 104 MMOL/L (ref 98–107)
CO2 SERPL-SCNC: 23 MMOL/L (ref 23–31)
CREAT SERPL-MCNC: 0.8 MG/DL (ref 0.55–1.02)
DIFFERENTIAL METHOD BLD: ABNORMAL
EGFR (NO RACE VARIABLE) (RUSH/TITUS): 76 ML/MIN/1.73M2
EOSINOPHIL # BLD AUTO: 0 K/UL (ref 0–0.5)
EOSINOPHIL NFR BLD AUTO: 0 % (ref 1–4)
ERYTHROCYTE [DISTWIDTH] IN BLOOD BY AUTOMATED COUNT: 13.4 % (ref 11.5–14.5)
GLUCOSE SERPL-MCNC: 262 MG/DL (ref 70–105)
GLUCOSE SERPL-MCNC: 299 MG/DL (ref 70–105)
GLUCOSE SERPL-MCNC: 318 MG/DL (ref 70–105)
GLUCOSE SERPL-MCNC: 331 MG/DL (ref 82–115)
HCT VFR BLD AUTO: 32.2 % (ref 38–47)
HGB BLD-MCNC: 10.3 G/DL (ref 12–16)
IMM GRANULOCYTES # BLD AUTO: 0.02 K/UL (ref 0–0.04)
IMM GRANULOCYTES NFR BLD: 0.3 % (ref 0–0.4)
LYMPHOCYTES # BLD AUTO: 0.61 K/UL (ref 1–4.8)
LYMPHOCYTES NFR BLD AUTO: 9.7 % (ref 27–41)
MCH RBC QN AUTO: 26 PG (ref 27–31)
MCHC RBC AUTO-ENTMCNC: 32 G/DL (ref 32–36)
MCV RBC AUTO: 81.3 FL (ref 80–96)
MONOCYTES # BLD AUTO: 0.44 K/UL (ref 0–0.8)
MONOCYTES NFR BLD AUTO: 7 % (ref 2–6)
MPC BLD CALC-MCNC: 9.2 FL (ref 9.4–12.4)
NEUTROPHILS # BLD AUTO: 5.22 K/UL (ref 1.8–7.7)
NEUTROPHILS NFR BLD AUTO: 82.8 % (ref 53–65)
NRBC # BLD AUTO: 0 X10E3/UL
NRBC, AUTO (.00): 0 %
PLATELET # BLD AUTO: 481 K/UL (ref 150–400)
POTASSIUM SERPL-SCNC: 4.5 MMOL/L (ref 3.5–5.1)
RBC # BLD AUTO: 3.96 M/UL (ref 4.2–5.4)
SODIUM SERPL-SCNC: 134 MMOL/L (ref 136–145)
WBC # BLD AUTO: 6.3 K/UL (ref 4.5–11)

## 2025-07-02 PROCEDURE — 25000003 PHARM REV CODE 250: Performed by: ORTHOPAEDIC SURGERY

## 2025-07-02 PROCEDURE — 80048 BASIC METABOLIC PNL TOTAL CA: CPT | Performed by: FAMILY MEDICINE

## 2025-07-02 PROCEDURE — 99233 SBSQ HOSP IP/OBS HIGH 50: CPT | Mod: ,,, | Performed by: FAMILY MEDICINE

## 2025-07-02 PROCEDURE — C1713 ANCHOR/SCREW BN/BN,TIS/BN: HCPCS | Performed by: ORTHOPAEDIC SURGERY

## 2025-07-02 PROCEDURE — 85025 COMPLETE CBC W/AUTO DIFF WBC: CPT | Performed by: FAMILY MEDICINE

## 2025-07-02 PROCEDURE — 97530 THERAPEUTIC ACTIVITIES: CPT

## 2025-07-02 PROCEDURE — 27000510 HC BLANKET BAIR HUGGER ANY SIZE: Performed by: ANESTHESIOLOGY

## 2025-07-02 PROCEDURE — 27000284 HC CANNULA NASAL: Performed by: ANESTHESIOLOGY

## 2025-07-02 PROCEDURE — 99900035 HC TECH TIME PER 15 MIN (STAT)

## 2025-07-02 PROCEDURE — 0RG2071 FUSION OF 2 OR MORE CERVICAL VERTEBRAL JOINTS WITH AUTOLOGOUS TISSUE SUBSTITUTE, POSTERIOR APPROACH, POSTERIOR COLUMN, OPEN APPROACH: ICD-10-PCS | Performed by: ORTHOPAEDIC SURGERY

## 2025-07-02 PROCEDURE — 27201480 HC GLIDESCOPE: Performed by: ANESTHESIOLOGY

## 2025-07-02 PROCEDURE — 27000655: Performed by: ANESTHESIOLOGY

## 2025-07-02 PROCEDURE — 63600175 PHARM REV CODE 636 W HCPCS: Performed by: ORTHOPAEDIC SURGERY

## 2025-07-02 PROCEDURE — 27201423 OPTIME MED/SURG SUP & DEVICES STERILE SUPPLY: Performed by: ORTHOPAEDIC SURGERY

## 2025-07-02 PROCEDURE — 27800903 OPTIME MED/SURG SUP & DEVICES OTHER IMPLANTS: Performed by: ORTHOPAEDIC SURGERY

## 2025-07-02 PROCEDURE — 63600175 PHARM REV CODE 636 W HCPCS: Performed by: FAMILY MEDICINE

## 2025-07-02 PROCEDURE — 82962 GLUCOSE BLOOD TEST: CPT

## 2025-07-02 PROCEDURE — 36415 COLL VENOUS BLD VENIPUNCTURE: CPT | Performed by: FAMILY MEDICINE

## 2025-07-02 PROCEDURE — 11000001 HC ACUTE MED/SURG PRIVATE ROOM

## 2025-07-02 PROCEDURE — 37000009 HC ANESTHESIA EA ADD 15 MINS: Performed by: ORTHOPAEDIC SURGERY

## 2025-07-02 PROCEDURE — 36000710: Performed by: ORTHOPAEDIC SURGERY

## 2025-07-02 PROCEDURE — 36000711: Performed by: ORTHOPAEDIC SURGERY

## 2025-07-02 PROCEDURE — 25000003 PHARM REV CODE 250: Performed by: FAMILY MEDICINE

## 2025-07-02 PROCEDURE — 71000033 HC RECOVERY, INTIAL HOUR: Performed by: ORTHOPAEDIC SURGERY

## 2025-07-02 PROCEDURE — 27000716 HC OXISENSOR PROBE, ANY SIZE: Performed by: ANESTHESIOLOGY

## 2025-07-02 PROCEDURE — 25000003 PHARM REV CODE 250: Performed by: NURSE ANESTHETIST, CERTIFIED REGISTERED

## 2025-07-02 PROCEDURE — 27200700 HC TUBE, ENDOTRACHEAL NASAL RAE: Performed by: ANESTHESIOLOGY

## 2025-07-02 PROCEDURE — 25000242 PHARM REV CODE 250 ALT 637 W/ HCPCS: Performed by: ORTHOPAEDIC SURGERY

## 2025-07-02 PROCEDURE — 37000008 HC ANESTHESIA 1ST 15 MINUTES: Performed by: ORTHOPAEDIC SURGERY

## 2025-07-02 PROCEDURE — 63600175 PHARM REV CODE 636 W HCPCS: Performed by: NURSE ANESTHETIST, CERTIFIED REGISTERED

## 2025-07-02 PROCEDURE — 27000165 HC TUBE, ETT CUFFED: Performed by: ANESTHESIOLOGY

## 2025-07-02 RX ORDER — LIDOCAINE HYDROCHLORIDE 20 MG/ML
INJECTION, SOLUTION EPIDURAL; INFILTRATION; INTRACAUDAL; PERINEURAL
Status: DISCONTINUED | OUTPATIENT
Start: 2025-07-02 | End: 2025-07-02

## 2025-07-02 RX ORDER — MORPHINE SULFATE 10 MG/ML
4 INJECTION INTRAMUSCULAR; INTRAVENOUS; SUBCUTANEOUS EVERY 5 MIN PRN
Status: DISCONTINUED | OUTPATIENT
Start: 2025-07-02 | End: 2025-07-02 | Stop reason: HOSPADM

## 2025-07-02 RX ORDER — INSULIN GLARGINE 100 [IU]/ML
25 INJECTION, SOLUTION SUBCUTANEOUS DAILY
Status: DISCONTINUED | OUTPATIENT
Start: 2025-07-02 | End: 2025-07-02

## 2025-07-02 RX ORDER — FENTANYL CITRATE 50 UG/ML
INJECTION, SOLUTION INTRAMUSCULAR; INTRAVENOUS
Status: DISCONTINUED | OUTPATIENT
Start: 2025-07-02 | End: 2025-07-02

## 2025-07-02 RX ORDER — TRANEXAMIC ACID 1 G/10ML
INJECTION, SOLUTION INTRAVENOUS
Status: DISCONTINUED | OUTPATIENT
Start: 2025-07-02 | End: 2025-07-02

## 2025-07-02 RX ORDER — EPINEPHRINE 0.1 MG/ML
INJECTION INTRAVENOUS
Status: DISCONTINUED | OUTPATIENT
Start: 2025-07-02 | End: 2025-07-02 | Stop reason: HOSPADM

## 2025-07-02 RX ORDER — PHENYLEPHRINE HYDROCHLORIDE 10 MG/ML
INJECTION INTRAVENOUS
Status: DISCONTINUED | OUTPATIENT
Start: 2025-07-02 | End: 2025-07-02

## 2025-07-02 RX ORDER — MIDAZOLAM HYDROCHLORIDE 1 MG/ML
INJECTION INTRAMUSCULAR; INTRAVENOUS
Status: DISCONTINUED | OUTPATIENT
Start: 2025-07-02 | End: 2025-07-02

## 2025-07-02 RX ORDER — ONDANSETRON HYDROCHLORIDE 2 MG/ML
INJECTION, SOLUTION INTRAVENOUS
Status: DISCONTINUED | OUTPATIENT
Start: 2025-07-02 | End: 2025-07-02

## 2025-07-02 RX ORDER — INSULIN GLARGINE 100 [IU]/ML
20 INJECTION, SOLUTION SUBCUTANEOUS DAILY
Status: DISCONTINUED | OUTPATIENT
Start: 2025-07-02 | End: 2025-07-05 | Stop reason: HOSPADM

## 2025-07-02 RX ORDER — MEPERIDINE HYDROCHLORIDE 25 MG/ML
25 INJECTION INTRAMUSCULAR; INTRAVENOUS; SUBCUTANEOUS EVERY 10 MIN PRN
Status: DISCONTINUED | OUTPATIENT
Start: 2025-07-02 | End: 2025-07-02 | Stop reason: HOSPADM

## 2025-07-02 RX ORDER — DIPHENHYDRAMINE HYDROCHLORIDE 50 MG/ML
25 INJECTION, SOLUTION INTRAMUSCULAR; INTRAVENOUS EVERY 6 HOURS PRN
Status: DISCONTINUED | OUTPATIENT
Start: 2025-07-02 | End: 2025-07-02 | Stop reason: HOSPADM

## 2025-07-02 RX ORDER — ROCURONIUM BROMIDE 10 MG/ML
INJECTION, SOLUTION INTRAVENOUS
Status: DISCONTINUED | OUTPATIENT
Start: 2025-07-02 | End: 2025-07-02

## 2025-07-02 RX ORDER — VECURONIUM BROMIDE 1 MG/ML
INJECTION, POWDER, LYOPHILIZED, FOR SOLUTION INTRAVENOUS
Status: DISCONTINUED | OUTPATIENT
Start: 2025-07-02 | End: 2025-07-02

## 2025-07-02 RX ORDER — GLUCAGON 1 MG
1 KIT INJECTION
Status: DISCONTINUED | OUTPATIENT
Start: 2025-07-02 | End: 2025-07-02 | Stop reason: HOSPADM

## 2025-07-02 RX ORDER — PROPOFOL 10 MG/ML
VIAL (ML) INTRAVENOUS
Status: DISCONTINUED | OUTPATIENT
Start: 2025-07-02 | End: 2025-07-02

## 2025-07-02 RX ORDER — ONDANSETRON HYDROCHLORIDE 2 MG/ML
4 INJECTION, SOLUTION INTRAVENOUS DAILY PRN
Status: DISCONTINUED | OUTPATIENT
Start: 2025-07-02 | End: 2025-07-02 | Stop reason: HOSPADM

## 2025-07-02 RX ORDER — VANCOMYCIN HYDROCHLORIDE 1 G/20ML
INJECTION, POWDER, LYOPHILIZED, FOR SOLUTION INTRAVENOUS
Status: DISCONTINUED | OUTPATIENT
Start: 2025-07-02 | End: 2025-07-02 | Stop reason: HOSPADM

## 2025-07-02 RX ORDER — ISOSORBIDE MONONITRATE 30 MG/1
30 TABLET, EXTENDED RELEASE ORAL DAILY
Status: DISCONTINUED | OUTPATIENT
Start: 2025-07-02 | End: 2025-07-05 | Stop reason: HOSPADM

## 2025-07-02 RX ORDER — HYDROMORPHONE HYDROCHLORIDE 2 MG/ML
0.5 INJECTION, SOLUTION INTRAMUSCULAR; INTRAVENOUS; SUBCUTANEOUS EVERY 5 MIN PRN
Status: DISCONTINUED | OUTPATIENT
Start: 2025-07-02 | End: 2025-07-02 | Stop reason: HOSPADM

## 2025-07-02 RX ADMIN — EZETIMIBE 10 MG: 10 TABLET ORAL at 08:07

## 2025-07-02 RX ADMIN — DEXAMETHASONE SODIUM PHOSPHATE 8 MG: 4 INJECTION, SOLUTION INTRA-ARTICULAR; INTRALESIONAL; INTRAMUSCULAR; INTRAVENOUS; SOFT TISSUE at 12:07

## 2025-07-02 RX ADMIN — VECURONIUM BROMIDE 3 MG: 1 INJECTION, POWDER, LYOPHILIZED, FOR SOLUTION INTRAVENOUS at 11:07

## 2025-07-02 RX ADMIN — FAMOTIDINE 20 MG: 20 TABLET, FILM COATED ORAL at 08:07

## 2025-07-02 RX ADMIN — ACETAMINOPHEN 500 MG: 500 TABLET ORAL at 05:07

## 2025-07-02 RX ADMIN — METHOCARBAMOL 500 MG: 500 TABLET ORAL at 08:07

## 2025-07-02 RX ADMIN — PHENYLEPHRINE HYDROCHLORIDE 200 MCG: 10 INJECTION INTRAVENOUS at 11:07

## 2025-07-02 RX ADMIN — GABAPENTIN 800 MG: 400 CAPSULE ORAL at 03:07

## 2025-07-02 RX ADMIN — SENNOSIDES AND DOCUSATE SODIUM 1 TABLET: 50; 8.6 TABLET ORAL at 08:07

## 2025-07-02 RX ADMIN — TRANEXAMIC ACID 500 MG: 100 INJECTION, SOLUTION INTRAVENOUS at 10:07

## 2025-07-02 RX ADMIN — GABAPENTIN 800 MG: 400 CAPSULE ORAL at 08:07

## 2025-07-02 RX ADMIN — OXYCODONE HYDROCHLORIDE 5 MG: 5 TABLET ORAL at 12:07

## 2025-07-02 RX ADMIN — ACETAMINOPHEN 500 MG: 500 TABLET ORAL at 09:07

## 2025-07-02 RX ADMIN — MIDAZOLAM HYDROCHLORIDE 2 MG: 1 INJECTION, SOLUTION INTRAMUSCULAR; INTRAVENOUS at 10:07

## 2025-07-02 RX ADMIN — DEXAMETHASONE SODIUM PHOSPHATE 8 MG: 4 INJECTION, SOLUTION INTRA-ARTICULAR; INTRALESIONAL; INTRAMUSCULAR; INTRAVENOUS; SOFT TISSUE at 05:07

## 2025-07-02 RX ADMIN — CALCIUM CARBONATE (ANTACID) CHEW TAB 500 MG 500 MG: 500 CHEW TAB at 08:07

## 2025-07-02 RX ADMIN — SODIUM CHLORIDE: 9 INJECTION, SOLUTION INTRAVENOUS at 12:07

## 2025-07-02 RX ADMIN — ONDANSETRON 8 MG: 2 INJECTION INTRAMUSCULAR; INTRAVENOUS at 10:07

## 2025-07-02 RX ADMIN — ROCURONIUM BROMIDE 50 MG: 10 INJECTION, SOLUTION INTRAVENOUS at 10:07

## 2025-07-02 RX ADMIN — Medication 400 UNITS: at 09:07

## 2025-07-02 RX ADMIN — INSULIN GLARGINE 20 UNITS: 100 INJECTION, SOLUTION SUBCUTANEOUS at 09:07

## 2025-07-02 RX ADMIN — OXYCODONE HYDROCHLORIDE 5 MG: 5 TABLET ORAL at 03:07

## 2025-07-02 RX ADMIN — SODIUM CHLORIDE: 9 INJECTION, SOLUTION INTRAVENOUS at 10:07

## 2025-07-02 RX ADMIN — ACETAMINOPHEN 500 MG: 500 TABLET ORAL at 01:07

## 2025-07-02 RX ADMIN — ACETAMINOPHEN 500 MG: 500 TABLET ORAL at 03:07

## 2025-07-02 RX ADMIN — METHOCARBAMOL 500 MG: 500 TABLET ORAL at 09:07

## 2025-07-02 RX ADMIN — CETIRIZINE HYDROCHLORIDE 10 MG: 10 TABLET, FILM COATED ORAL at 08:07

## 2025-07-02 RX ADMIN — TRANEXAMIC ACID 1000 MG: 100 INJECTION, SOLUTION INTRAVENOUS at 12:07

## 2025-07-02 RX ADMIN — ISOSORBIDE MONONITRATE 30 MG: 30 TABLET, EXTENDED RELEASE ORAL at 08:07

## 2025-07-02 RX ADMIN — DEXMEDETOMIDINE: 200 INJECTION, SOLUTION INTRAVENOUS at 12:07

## 2025-07-02 RX ADMIN — OXYCODONE HYDROCHLORIDE 5 MG: 5 TABLET ORAL at 09:07

## 2025-07-02 RX ADMIN — OXYCODONE HYDROCHLORIDE 5 MG: 5 TABLET ORAL at 08:07

## 2025-07-02 RX ADMIN — DEXTROSE MONOHYDRATE 2 G: 50 INJECTION, SOLUTION INTRAVENOUS at 10:07

## 2025-07-02 RX ADMIN — PHENYLEPHRINE HYDROCHLORIDE 100 MCG: 10 INJECTION INTRAVENOUS at 11:07

## 2025-07-02 RX ADMIN — LIDOCAINE HYDROCHLORIDE 80 MG: 20 INJECTION, SOLUTION EPIDURAL; INFILTRATION; INTRACAUDAL; PERINEURAL at 10:07

## 2025-07-02 RX ADMIN — PROPOFOL 180 MG: 10 INJECTION, EMULSION INTRAVENOUS at 10:07

## 2025-07-02 RX ADMIN — FENTANYL CITRATE 100 MCG: 50 INJECTION, SOLUTION INTRAMUSCULAR; INTRAVENOUS at 10:07

## 2025-07-02 RX ADMIN — METHOCARBAMOL 500 MG: 500 TABLET ORAL at 03:07

## 2025-07-02 RX ADMIN — SUGAMMADEX 200 MG: 100 INJECTION, SOLUTION INTRAVENOUS at 12:07

## 2025-07-02 RX ADMIN — DOCUSATE SODIUM 100 MG: 100 CAPSULE, LIQUID FILLED ORAL at 08:07

## 2025-07-02 RX ADMIN — FLUOXETINE HYDROCHLORIDE 10 MG: 10 CAPSULE ORAL at 08:07

## 2025-07-02 RX ADMIN — OXYCODONE HYDROCHLORIDE 5 MG: 5 TABLET ORAL at 04:07

## 2025-07-02 NOTE — SUBJECTIVE & OBJECTIVE
Objective:     Vital Signs (Most Recent):  Temp: 97.7 °F (36.5 °C) (07/02/25 1619)  Pulse: 66 (07/02/25 1619)  Resp: 18 (07/02/25 1619)  BP: 132/65 (07/02/25 1342)  SpO2: 100 % (07/02/25 1619) Vital Signs (24h Range):  Temp:  [97.2 °F (36.2 °C)-97.8 °F (36.6 °C)] 97.7 °F (36.5 °C)  Pulse:  [66-86] 66  Resp:  [10-18] 18  SpO2:  [93 %-100 %] 100 %  BP: (114-186)/(61-78) 132/65     Weight: 59.4 kg (131 lb)  Body mass index is 21.14 kg/m².     Physical Exam  Constitutional:       Appearance: Normal appearance.   HENT:      Head: Normocephalic and atraumatic.      Nose: Nose normal.   Eyes:      Extraocular Movements: Extraocular movements intact.      Pupils: Pupils are equal, round, and reactive to light.   Neck:      Comments: Cervical collar in place.  Cardiovascular:      Rate and Rhythm: Normal rate and regular rhythm.   Pulmonary:      Effort: Pulmonary effort is normal.      Breath sounds: Normal breath sounds.   Abdominal:      General: Bowel sounds are normal.      Palpations: Abdomen is soft.   Skin:     General: Skin is warm and dry.   Neurological:      General: No focal deficit present.      Mental Status: She is alert and oriented to person, place, and time.   Psychiatric:         Mood and Affect: Mood normal.         Behavior: Behavior normal.          Significant Labs: All pertinent labs within the past 24 hours have been reviewed.    Significant Imaging: I have reviewed all pertinent imaging results/findings within the past 24 hours.

## 2025-07-02 NOTE — PLAN OF CARE
Problem: Adult Inpatient Plan of Care  Goal: Plan of Care Review  Outcome: Progressing  Goal: Patient-Specific Goal (Individualized)  Outcome: Progressing  Goal: Absence of Hospital-Acquired Illness or Injury  Outcome: Progressing  Goal: Optimal Comfort and Wellbeing  Outcome: Progressing  Goal: Readiness for Transition of Care  Outcome: Progressing     Problem: Diabetes Comorbidity  Goal: Blood Glucose Level Within Targeted Range  Outcome: Progressing     Problem: Wound  Goal: Optimal Coping  Outcome: Progressing  Goal: Optimal Functional Ability  Outcome: Progressing  Goal: Absence of Infection Signs and Symptoms  Outcome: Progressing     Problem: Fall Injury Risk  Goal: Absence of Fall and Fall-Related Injury  Outcome: Progressing

## 2025-07-02 NOTE — PROGRESS NOTES
[facility]  [facility] Medicine  Progress Note    Patient Name: Nicol Reese  MRN: [id number]  Patient Class: IP- Inpatient   Admission Date: 7/1/2025  Length of Stay: 1 days  Attending Physician: Wiley Alexander MD  Primary Care Provider: Ga Mazariegos MD        Subjective     Principal Problem:Cervical disc disease with myelopathy        HPI:  Mrs. Reese is a 73 yo female with t2dm, hld, cervical spondylosis, and cervical disc disease with myelopathy.  She underwent anterior cervical diskectomy and fusion of C3-C5. Tolerated procedure without complications. Hospital medicine has been consulted for med management.  Appropriate home meds have been resumed.    Overview/Hospital Course:  7/2 - scheduled to go back to OR today for 2nd procedure. Hyperglycemia noted. Lantus ordered.        Objective:     Vital Signs (Most Recent):  Temp: 97.7 °F (36.5 °C) (07/02/25 1619)  Pulse: 66 (07/02/25 1619)  Resp: 18 (07/02/25 1619)  BP: 132/65 (07/02/25 1342)  SpO2: 100 % (07/02/25 1619) Vital Signs (24h Range):  Temp:  [97.2 °F (36.2 °C)-97.8 °F (36.6 °C)] 97.7 °F (36.5 °C)  Pulse:  [66-86] 66  Resp:  [10-18] 18  SpO2:  [93 %-100 %] 100 %  BP: (114-186)/(61-78) 132/65     Weight: 59.4 kg (131 lb)  Body mass index is 21.14 kg/m².     Physical Exam  Constitutional:       Appearance: Normal appearance.   HENT:      Head: Normocephalic and atraumatic.      Nose: Nose normal.   Eyes:      Extraocular Movements: Extraocular movements intact.      Pupils: Pupils are equal, round, and reactive to light.   Neck:      Comments: Cervical collar in place.  Cardiovascular:      Rate and Rhythm: Normal rate and regular rhythm.   Pulmonary:      Effort: Pulmonary effort is normal.      Breath sounds: Normal breath sounds.   Abdominal:      General: Bowel sounds are normal.      Palpations: Abdomen is soft.   Skin:     General: Skin is warm and dry.   Neurological:      General: No focal deficit present.      Mental  "Status: She is alert and oriented to person, place, and time.   Psychiatric:         Mood and Affect: Mood normal.         Behavior: Behavior normal.          Significant Labs: All pertinent labs within the past 24 hours have been reviewed.    Significant Imaging: I have reviewed all pertinent imaging results/findings within the past 24 hours.      Assessment & Plan  Cervical disc disease with myelopathy  S/p anterior cervical diskectomy with fusion of C3-C5  Ortho spine managing.  PRN analgesics.  PT/OT.    Diabetes mellitus, type 2  Patient's FSGs are uncontrolled due to hyperglycemia on current medication regimen.  Last A1c reviewed-   Lab Results   Component Value Date    HGBA1C 9 (H) 05/27/2025     Most recent fingerstick glucose reviewed- No results for input(s): "POCTGLUCOSE" in the last 24 hours.  Current correctional scale  Medium  Maintain anti-hyperglycemic dose as follows-   Antihyperglycemics (From admission, onward)      Start     Stop Route Frequency Ordered    07/02/25 0900  insulin glargine U-100 (Lantus) injection 20 Units         -- SubQ Daily 07/02/25 0702    07/01/25 1110  insulin aspart U-100 injection 1-10 Units         -- SubQ Before meals & nightly PRN 07/01/25 1111          Hold Oral hypoglycemics while patient is in the hospital.  Hyperlipidemia  Continue ezetimibe.  Cervical spondylosis  S/p anterior cervical diskectomy with fusion of C3-C5  Ortho spine managing.  PRN analgesics.  PT/OT  Hypertension  Patient's blood pressure range in the last 24 hours was: BP  Min: 114/63  Max: 186/72.The patient's inpatient anti-hypertensive regimen is listed below:  Current Antihypertensives  isosorbide mononitrate 24 hr tablet 30 mg, Daily, Oral    Plan  - BP is controlled, no changes needed to their regimen  - Routine vital signs.    VTE Risk Mitigation (From admission, onward)           Ordered     Place MARIANA hose  Until discontinued         07/01/25 1109     Place sequential compression device  " Until discontinued         07/01/25 1109                    Discharge Planning   JULIET: 7/7/2025     Code Status: Prior   Medical Readiness for Discharge Date:   Discharge Plan A: Skilled Nursing Facility          SDOH Screening:  The patient was screened for utility difficulties, food insecurity, transport difficulties, housing insecurity, and interpersonal safety and there were no concerns identified this admission.                Please place Justification for DME      Zoe Armas DO  Department of Hospital Medicine   [facility]

## 2025-07-02 NOTE — PLAN OF CARE
Met with patient and her daughter to review discharge recommendation of SNF and is agreeable to plan.    Patient/family provided list of facilities in-network with patient's payor plan. Providers that are owned, operated, or affiliated with [facility] are included on the list.     Notified that referral sent to below listed facilities from in-network list based on proximity to home/family support:   [facility]  2.  3.  4.  5. (can send more than 5)    Patient/family instructed to identify preference.    Preferred Facility: (if more than 1, listed in order of descending preference)  [facility]  OR  Patient has declined to select a preferred provider and elects placement with the first accepting in network provider that is available to provide services as ordered by the physician       If an additional preferred facility not listed above is identified, additional referral to be sent. If above facilities unable to accept, will send additional referrals to in-network providers.    CM faxed referral to [facility] and Cheryl was notified.  CM will continue to follow for DC needs as they arise.

## 2025-07-02 NOTE — OR NURSING
1255- Patient arrived in pacu. Pt drowsy. Vss, respirations even et unlabored. Dressing on posterior neck of gauze and transparent dressing. Dressing c/d/I. No signs of bleeding or drainage.Hemovac in place to posterior neck incision. Hemovac compressed and draining a small amount of serosanguinous fluid. Neck brace in place. No signs of pain or distress.     1215- Pt more awake, Responds appropriately to verbal cues. Vss, respirations even et unlabored. Pt dressing c/d/I. No signs of bleeding or drainage. Pt denies pain at this time. Pt denies numbness ot tingling in extremities. Additional blankets placed on patient for comfort.     1328- Pt resting comfortably. Denies pain. Dressing on posterior neck c/d/I. Pt denies pain, or numbness or tingling of extremities. Pt able to move all extremities.Hemovac in place to posterior neck, hemovac compressed and draining a small amount of serosanguinous fluid. Neck brace in place. Pt ready for discharge per pacu criteria.  Pt transported to room 456 by hospital bed.

## 2025-07-02 NOTE — PT/OT/SLP PROGRESS
Occupational Therapy      Patient Name:  Nicol Reese   MRN:  [id number]    Patient not seen today secondary to Off the floor for procedure/surgery. Will follow-up 7/3/25.    7/2/2025

## 2025-07-02 NOTE — HOSPITAL COURSE
73 yo female admitted with cervical spondylosis and cervical disk disease with myelopathy s/p anterior cervical diskectomy with fusion of C3-C5 and posterolateral fusion and laminectomy with decompression C3-C5.  Hospital med consulted for medical management.    7/4 - progressing well with PT/OT. Bp uncontrolled. Changes made to med regimen.  Was scheduled to go to rehab today; however, they can't accept patient until tomorrow.    7/5 - bp remains elevated. This will need to monitored further at rehab facility and changes made as indicated.

## 2025-07-02 NOTE — ANESTHESIA PROCEDURE NOTES
Intubation    Date/Time: 7/2/2025 11:06 AM    Performed by: Seferino Evans CRNA  Authorized by: Donis Cooper MD    Intubation:     Induction:  Intravenous    Intubated:  Postinduction    Mask Ventilation:  Easy mask    Attempts:  1    Attempted By:  CRNA    Method of Intubation:  Video laryngoscopy    Blade:  Glidescope 3    Laryngeal View Grade: Grade I - full view of cords      Difficult Airway Encountered?: No      Complications:  None    Airway Device:  Oral endotracheal tube    Airway Device Size:  7.0    Style/Cuff Inflation:  Cuffed (inflated to minimal occlusive pressure)    Tube secured:  20    Secured at:  The lips    Placement Verified By:  Capnometry    Complicating Factors:  Cervical collar    Findings Post-Intubation:  BS equal bilateral and atraumatic/condition of teeth unchanged  Notes:      Head and neck maintained in neutral position established by patient prior to induction as comfortable.

## 2025-07-02 NOTE — TRANSFER OF CARE
"Anesthesia Transfer of Care Note    Patient: Nicol Reese    Procedure(s) Performed: Procedure(s) (LRB):  LAMINECTOMY, SPINE, CERVICAL, POSTERIOR, W/ ARTHRODESIS (N/A)    Patient location: PACU    Anesthesia Type: general    Transport from OR: Transported from OR on room air with adequate spontaneous ventilation    Post pain: adequate analgesia    Post assessment: no apparent anesthetic complications and tolerated procedure well    Post vital signs: stable    Level of consciousness: responds to stimulation and sedated    Nausea/Vomiting: no nausea/vomiting    Complications: none    Transfer of care protocol was followed      Last vitals: Visit Vitals  /63 (BP Location: Right arm, Patient Position: Lying)   Pulse 70   Temp 36.4 °C (97.5 °F) (Oral)   Resp 10   Ht 5' 6" (1.676 m)   Wt 59.4 kg (131 lb)   SpO2 96%   Breastfeeding No   BMI 21.14 kg/m²     "

## 2025-07-02 NOTE — ASSESSMENT & PLAN NOTE
"Patient's FSGs are uncontrolled due to hyperglycemia on current medication regimen.  Last A1c reviewed-   Lab Results   Component Value Date    HGBA1C 9 (H) 05/27/2025     Most recent fingerstick glucose reviewed- No results for input(s): "POCTGLUCOSE" in the last 24 hours.  Current correctional scale  Medium  Maintain anti-hyperglycemic dose as follows-   Antihyperglycemics (From admission, onward)      Start     Stop Route Frequency Ordered    07/02/25 0900  insulin glargine U-100 (Lantus) injection 20 Units         -- SubQ Daily 07/02/25 0702    07/01/25 1110  insulin aspart U-100 injection 1-10 Units         -- SubQ Before meals & nightly PRN 07/01/25 1111          Hold Oral hypoglycemics while patient is in the hospital.  "

## 2025-07-02 NOTE — ASSESSMENT & PLAN NOTE
Patient's blood pressure range in the last 24 hours was: BP  Min: 114/63  Max: 186/72.The patient's inpatient anti-hypertensive regimen is listed below:  Current Antihypertensives  isosorbide mononitrate 24 hr tablet 30 mg, Daily, Oral    Plan  - BP is controlled, no changes needed to their regimen  - Routine vital signs.

## 2025-07-02 NOTE — PT/OT/SLP PROGRESS
Physical Therapy Treatment    Patient Name:  Nicol Reese   MRN:  [id number]    Recommendations:     Discharge Recommendations: Moderate Intensity Therapy  Discharge Equipment Recommendations: to be determined by next level of care  Barriers to discharge: ongoing medical care    Assessment:     Nicol Reese is a 74 y.o. female admitted with a medical diagnosis of Cervical disc disease with myelopathy.  She presents with the following impairments/functional limitations: weakness, impaired endurance, impaired self care skills, impaired functional mobility, gait instability, impaired balance, decreased lower extremity function, decreased upper extremity function. Pt has undergone stage II of cervical surgery today. Pt increasingly drowsy on first attempt, but more alert and agreeable on second attempt. Pt still has limited use of RUE.    Rehab Prognosis: Good; patient would benefit from acute skilled PT services to address these deficits and reach maximum level of function.    Recent Surgery: Procedure(s) (LRB):  LAMINECTOMY, SPINE, CERVICAL, POSTERIOR, W/ ARTHRODESIS (N/A) * Day of Surgery *    Plan:     During this hospitalization, patient to be seen daily to address the identified rehab impairments via gait training, therapeutic activities, therapeutic exercises, neuromuscular re-education and progress toward the following goals:    Plan of Care Expires:  08/01/25    Subjective     Chief Complaint: Cervical disc disease with myelopathy   Patient/Family Comments/goals: agreeable  Pain/Comfort:  Pain Rating 1: 8/10  Location - Orientation 1: posterior  Location 1: head  Pain Addressed 1: Reposition, Distraction, Nurse notified      Objective:     Communicated with DEBBI Hunt RN prior to session.  Patient found HOB elevated with   IV, SCD, cervical collar, hemovac upon PT entry to room.     General Precautions: Standard, fall  Orthopedic Precautions: spinal precautions  Braces: Cervical collar  Respiratory Status: Room  air     Functional Mobility:  Bed Mobility:     Scooting: minimum assistance  Supine to Sit: minimum assistance and of 1-2 persons  Transfers:     Sit to Stand:  minimum assistance and of 2 persons with hand-held assist  Bed to BSC: minimum assistance and of 2 persons with  hand-held assist  using  Stand Pivot  Balance: Fair- in stance      AM-PAC 6 CLICK MOBILITY  Turning over in bed (including adjusting bedclothes, sheets and blankets)?: 3  Sitting down on and standing up from a chair with arms (e.g., wheelchair, bedside commode, etc.): 3  Moving from lying on back to sitting on the side of the bed?: 3  Moving to and from a bed to a chair (including a wheelchair)?: 3  Need to walk in hospital room?: 3  Climbing 3-5 steps with a railing?: 2  Basic Mobility Total Score: 17       Treatment & Education:  Mobility as stated above. Session limited by need for toileting act.     Patient left on BSC with RN and CNA  notified and daughter present..    GOALS:   Multidisciplinary Problems       Physical Therapy Goals          Problem: Physical Therapy    Goal Priority Disciplines Outcome Interventions   Physical Therapy Goal     PT, PT/OT Progressing    Description: Short term goals:  . Supine to sit with Stand-by Assistance  . Sit to supine with Stand-by Assistance  . Sit to stand transfer with Stand-by Assistance  . Gait  x 150 feet with Contact Guard Assistance using Single-point Cane .     Long term goals:  Patient will gain highest level functional mobility with lowest level of assistive device to return to desired living arrangement and prior ADL's.                          DME Justifications:  To be determined by next level of care.    Time Tracking:     PT Received On: 07/02/25  PT Start Time: 1718     PT Stop Time: 1735  PT Total Time (min): 17 min     Billable Minutes: Therapeutic Activity 15    Treatment Type: Treatment  PT/PTA: PT     Number of PTA visits since last PT visit: 0     07/02/2025

## 2025-07-02 NOTE — INTERVAL H&P NOTE
The patient has been examined and the H&P has been reviewed:    I concur with the findings and changes have been noted since the H&P was written:  Status post C3-5 ACDF.  Plan for C3-5 laminectomy and fusion    Anesthesia/Surgery risks, benefits and alternative options discussed and understood by patient/family.          Active Hospital Problems    Diagnosis  POA    *Cervical disc disease with myelopathy [M50.00]  Yes    Cervical spondylosis [M47.812]  Yes    Hyperlipidemia [E78.5]  Yes    Diabetes mellitus, type 2 [E11.9]  Yes      Resolved Hospital Problems   No resolved problems to display.

## 2025-07-02 NOTE — ANESTHESIA PREPROCEDURE EVALUATION
07/02/2025  Nicol Reese is a 74 y.o., female.      Pre-op Assessment    I have reviewed the Patient Summary Reports.     I have reviewed the Nursing Notes.    I have reviewed the Medications.     Review of Systems         Anesthesia Plan  Type of Anesthesia, risks & benefits discussed:    Anesthesia Type: Gen ETT  Intra-op Monitoring Plan: Standard ASA Monitors  Post Op Pain Control Plan: IV/PO Opioids PRN  Induction:  IV  Informed Consent: Informed consent signed with the Patient and all parties understand the risks and agree with anesthesia plan.  All questions answered.   ASA Score: 2    Ready For Surgery From Anesthesia Perspective.     .  NPO greater than 8 hours  No anesthetic complications          Allergies      Augmentin [Amoxicillin-pot Clavulanate]                Hct 32  6/20/25 EKG: Normal sinus rhythm; 94 bpm   Septal infarct ,age undetermined   5/31/24 Echo: moderate MR; EF 65-70%    IDDM  Dyspnea on exertion  Rheumatoid arthritis     MP II; dentures

## 2025-07-02 NOTE — PLAN OF CARE
Problem: Adult Inpatient Plan of Care  Goal: Plan of Care Review  Outcome: Ongoing  Goal: Patient-Specific Goal (Individualized)  Outcome: Ongoing  Goal: Absence of Hospital-Acquired Illness or Injury  Outcome: Ongoing  Goal: Optimal Comfort and Wellbeing  Outcome: Ongoing  Goal: Readiness for Transition of Care  Outcome: Ongoing     Problem: Diabetes Comorbidity  Goal: Blood Glucose Level Within Targeted Range  Outcome: Ongoing     Problem: Wound  Goal: Optimal Coping  Outcome: Ongoing  Goal: Optimal Functional Ability  Outcome: Ongoing  Goal: Absence of Infection Signs and Symptoms  Outcome: Ongoing  Goal: Improved Oral Intake  Outcome: Ongoing  Goal: Optimal Pain Control and Function  Outcome: Ongoing  Goal: Skin Health and Integrity  Outcome: Ongoing  Goal: Optimal Wound Healing  Outcome: Ongoing     Problem: Fall Injury Risk  Goal: Absence of Fall and Fall-Related Injury  Outcome: Ongoing

## 2025-07-02 NOTE — PLAN OF CARE
[facility]  Initial Discharge Assessment       Primary Care Provider: Ga Mazariegos MD    Admission Diagnosis: Cervical spondylosis [M47.812]  Cervical disc disease with myelopathy [M50.00]    Admission Date: 7/1/2025  Expected Discharge Date: 7/7/2025    Transition of Care Barriers: None    Payor: MEDICARE / Plan: MEDICARE PART A & B / Product Type: Government /     Extended Emergency Contact Information  Primary Emergency Contact: Lauryn Galaviz  Address: [address] [place] [address]  Mobile Phone: [phone]  Relation: Daughter  Preferred language: English   needed? No  Secondary Emergency Contact: Nathalia Reese   [place]  Mobile Phone: [phone]  Relation: Daughter    Discharge Plan A: Skilled Nursing Facility  Discharge Plan B: Home Health, Long-term acute care facility (LTAC), Skilled Nursing Facility, Rehab      [facility] [address] [address]  Phone: [phone] Fax: [phone]    [place], [place] - [address]  [facility] [address]  Phone: [phone] Fax: [phone]      Initial Assessment (most recent)       Adult Discharge Assessment - 07/02/25 0935          Discharge Assessment    Assessment Type Discharge Planning Assessment     Source of Information patient     People in Home child(roby), adult     Name(s) of People in Home Lauryn george - [phone]     Facility Arrived From: Home     Do you expect to return to your current living situation? No     Do you have help at home or someone to help you manage your care at home? Yes     Who are your caregiver(s) and their phone number(s)? Lauryn george - [phone]     Prior to hospitilization cognitive status: Unable to Assess     Current cognitive status: Alert/Oriented     Walking or Climbing  Stairs Difficulty yes     Walking or Climbing Stairs ambulation difficulty, requires equipment;stair climbing difficulty, assistance 1 person     Dressing/Bathing Difficulty no     Home Accessibility stairs to enter home     Number of Stairs, Main Entrance three     Equipment Currently Used at Home cane, straight;bedside commode     Readmission within 30 days? No     Patient currently being followed by outpatient case management? No     Do you currently have service(s) that help you manage your care at home? No     Do you take prescription medications? Yes     Do you have prescription coverage? Yes     Do you have any problems affording any of your prescribed medications? No     Is the patient taking medications as prescribed? yes     Who is going to help you get home at discharge? Lauryn Galaviz - daughter - [phone]     How do you get to doctors appointments? family or friend will provide     Are you on dialysis? No     Do you take coumadin? No     Discharge Plan A Skilled Nursing Facility     Discharge Plan B Home Health;Long-term acute care facility (LTAC);Skilled Nursing Facility;Rehab     DME Needed Upon Discharge  none     Discharge Plan discussed with: Patient     Transition of Care Barriers None        Physical Activity    On average, how many days per week do you engage in moderate to strenuous exercise (like a brisk walk)? 0 days     On average, how many minutes do you engage in exercise at this level? 0 min        Financial Resource Strain    How hard is it for you to pay for the very basics like food, housing, medical care, and heating? Not hard at all        Housing Stability    In the last 12 months, was there a time when you were not able to pay the mortgage or rent on time? No     In the past 12 months, how many times have you moved where you were living? 1     At any time in the past 12 months, were you homeless or living in a shelter (including now)? No        Transportation Needs    In  the past 12 months, has lack of transportation kept you from medical appointments or from getting medications? No     In the past 12 months, has lack of transportation kept you from meetings, work, or from getting things needed for daily living? No        Food Insecurity    Within the past 12 months, you worried that your food would run out before you got the money to buy more. Never true     Within the past 12 months, the food you bought just didn't last and you didn't have money to get more. Never true        Stress    Do you feel stress - tense, restless, nervous, or anxious, or unable to sleep at night because your mind is troubled all the time - these days? Only a little        Social Isolation    How often do you feel lonely or isolated from those around you?  Never        Alcohol Use    Q1: How often do you have a drink containing alcohol? Never     Q2: How many drinks containing alcohol do you have on a typical day when you are drinking? Patient does not drink     Q3: How often do you have six or more drinks on one occasion? Never        [facility]    In the past 12 months has the electric, gas, oil, or water company threatened to shut off services in your home? No        Health Literacy    How often do you need to have someone help you when you read instructions, pamphlets, or other written material from your doctor or pharmacy? Always                   CM at bedside to complete initial discharge assessment.  Daughter present to assist.  Patient lives at home with her daughter.  Is not current with HH and uses a cane and BSC at home.  Patient plans to go to swing bed upon discharge.  IM obtained and SDOH questions completed.  CM will continue to follow for DC needs as they arise.

## 2025-07-02 NOTE — OP NOTE
[facility]  Surgery Department  Operative Note    SUMMARY     Date of Procedure: 7/2/2025     Procedure: Procedure(s) (LRB):  LAMINECTOMY, SPINE, CERVICAL, POSTERIOR, W/ ARTHRODESIS (N/A)     Surgeons and Role:     * Wiley Alexander MD - Primary    Assistant: None    Pre-Operative Diagnosis: Cervical spondylosis [M47.812]    Post-Operative Diagnosis: Post-Op Diagnosis Codes:     * Cervical spondylosis [M47.812]    Anesthesia: General    Technical Procedures Used:   1. Posterolateral fusion C3-C5, 50210, 97773   2. Laminectomy with decompression of central canal, neural foramen C3-C5, 99421, 50779  3. Posterior segmental instrumentation C3-C5, 93690  4. Use of allograft and autograft for spine surgery, 20930, 20936  5. Placement and removal of Martin-Wells tongs, 20660    This is stage II of a planned 2 stage procedure    Description of the Findings of the Procedure:   Indications:  This is a 74 y.o. female with cervical myeloradiculopathy.  They failed attempted conservative treatement.  Risks, benefits, and alternatives were discussed with the patient and they elected to proceed with surgery.    Procedure in detail:  The patient was identified in the preoperative holding area and the surgical site was marked. They were then taken back to the operating room where general endotracheal anesthesia was induced.  Martin-Wells tongs were placed using the usual sterile technique.  They were then transitioned to the prone position on the Cesar frame with the arms and legs in the physiologic position and all bony prominences well-padded.  15 lb of traction was applied to the tongs.  The posterior cervical spine was prepped and draped in the normal sterile fashion. A timeout was performed. The incision site was localized with intraoperative fluoroscopy. After the incision was marked out a solution of epinephrine was injected in the skin and subcutaneous tissues.  An erector spinae block  was performed bilaterally at T1. A posterior midline incision was made. This was carried down to the fascia which was divided in line with the incision. A subperiosteal dissection was performed exposing the posterior elements of C3-C5.    The bilateral C3-C5 facet joints were prepared for fusion with the high-speed bur.  Lateral mass screw tracts were drilled bilaterally from C3-C5.    Laminectomy was performed at C3-C5 with decompression of the central canal and neural foramen.  The lamina of C3 and C4 were removed with the use of Leksell rongeur and high-speed bur and Kerrison punch.  A dome laminectomy was performed at C5.  The nerve roots were followed out into the foramen and decompressed in the foramen with the Kerrison. The nerve roots were palpated and felt to be free of any compression.  The central canal and neural foramen were noted to be adequately decompressed.    The lateral mass screws were then inserted.  The appropriate size rods were selected and secured with set screws.  The set screws were final tightened using torque .  Final implant placement and spinal alignment was verified on fluoroscopic imaging found be satisfactory.  The wound was copiously irrigated with normal saline.  Zavation allograft was mixed with autograft from the laminectomy as well as 1 g of powdered vancomycin and was placed into the lateral gutters bilaterally.  A medium Hemovac drain was placed in the depths of the wound.    The muscular and fascial layer were closed with interrupted figure-of-eight #1 Vicryl suture and a running #1 Stratafix symmetric suture.  Powdered vancomycin was placed in the subcutaneous layer.  The subcutaneous layer was closed with a running Stratafix #0 suture. A running subcuticular layer was performed with 2-0 Stratafix suture. Dermabond prineo was applied to cover the incision. A sterile dressing of gauze and Tegaderm was then applied.  The drain was secured with Steri-Strips and dressed  with fluff gauze and Tegaderm dressing.  The weight was removed from the tongs.    The patient was then transitioned back to supine position and the tongs were removed without incident.  The patient was extubated and awakened and taken to recovery in good condition having suffered no untoward event.      Complications: No    Estimated Blood Loss (EBL):  75 mL           Implants:  Zavation posterior cervical rods and screws, allograft    Specimens:   Specimen (24h ago, onward)      None                    Condition: Good    Disposition: PACU - hemodynamically stable.    Attestation: I was present and scrubbed for the entire procedure.

## 2025-07-03 LAB
GLUCOSE SERPL-MCNC: 123 MG/DL (ref 70–105)
GLUCOSE SERPL-MCNC: 141 MG/DL (ref 70–105)
GLUCOSE SERPL-MCNC: 205 MG/DL (ref 70–105)
GLUCOSE SERPL-MCNC: 257 MG/DL (ref 70–105)
GLUCOSE SERPL-MCNC: 428 MG/DL (ref 70–105)

## 2025-07-03 PROCEDURE — 11000001 HC ACUTE MED/SURG PRIVATE ROOM

## 2025-07-03 PROCEDURE — 97110 THERAPEUTIC EXERCISES: CPT

## 2025-07-03 PROCEDURE — 63600175 PHARM REV CODE 636 W HCPCS: Performed by: FAMILY MEDICINE

## 2025-07-03 PROCEDURE — 63600175 PHARM REV CODE 636 W HCPCS: Performed by: ORTHOPAEDIC SURGERY

## 2025-07-03 PROCEDURE — 25000242 PHARM REV CODE 250 ALT 637 W/ HCPCS: Performed by: ORTHOPAEDIC SURGERY

## 2025-07-03 PROCEDURE — 25000003 PHARM REV CODE 250: Performed by: ORTHOPAEDIC SURGERY

## 2025-07-03 PROCEDURE — 25000003 PHARM REV CODE 250: Performed by: FAMILY MEDICINE

## 2025-07-03 PROCEDURE — 97116 GAIT TRAINING THERAPY: CPT

## 2025-07-03 PROCEDURE — 99900035 HC TECH TIME PER 15 MIN (STAT)

## 2025-07-03 PROCEDURE — 99233 SBSQ HOSP IP/OBS HIGH 50: CPT | Mod: ,,, | Performed by: FAMILY MEDICINE

## 2025-07-03 PROCEDURE — 82962 GLUCOSE BLOOD TEST: CPT

## 2025-07-03 RX ORDER — FAMOTIDINE 20 MG/1
20 TABLET, FILM COATED ORAL DAILY
Status: DISCONTINUED | OUTPATIENT
Start: 2025-07-04 | End: 2025-07-05 | Stop reason: HOSPADM

## 2025-07-03 RX ADMIN — METHOCARBAMOL 500 MG: 500 TABLET ORAL at 03:07

## 2025-07-03 RX ADMIN — OXYCODONE HYDROCHLORIDE 5 MG: 5 TABLET ORAL at 12:07

## 2025-07-03 RX ADMIN — GABAPENTIN 800 MG: 400 CAPSULE ORAL at 03:07

## 2025-07-03 RX ADMIN — OXYCODONE HYDROCHLORIDE 10 MG: 5 TABLET ORAL at 04:07

## 2025-07-03 RX ADMIN — Medication 400 UNITS: at 08:07

## 2025-07-03 RX ADMIN — POLYETHYLENE GLYCOL 3350 17 G: 17 POWDER, FOR SOLUTION ORAL at 08:07

## 2025-07-03 RX ADMIN — FLUOXETINE HYDROCHLORIDE 10 MG: 10 CAPSULE ORAL at 08:07

## 2025-07-03 RX ADMIN — CETIRIZINE HYDROCHLORIDE 10 MG: 10 TABLET, FILM COATED ORAL at 08:07

## 2025-07-03 RX ADMIN — ACETAMINOPHEN 500 MG: 500 TABLET ORAL at 09:07

## 2025-07-03 RX ADMIN — DOCUSATE SODIUM 100 MG: 100 CAPSULE, LIQUID FILLED ORAL at 08:07

## 2025-07-03 RX ADMIN — OXYCODONE HYDROCHLORIDE 5 MG: 5 TABLET ORAL at 03:07

## 2025-07-03 RX ADMIN — INSULIN ASPART 3 UNITS: 100 INJECTION, SOLUTION INTRAVENOUS; SUBCUTANEOUS at 09:07

## 2025-07-03 RX ADMIN — METHOCARBAMOL 500 MG: 500 TABLET ORAL at 08:07

## 2025-07-03 RX ADMIN — CALCIUM CARBONATE (ANTACID) CHEW TAB 500 MG 500 MG: 500 CHEW TAB at 08:07

## 2025-07-03 RX ADMIN — INSULIN ASPART 4 UNITS: 100 INJECTION, SOLUTION INTRAVENOUS; SUBCUTANEOUS at 09:07

## 2025-07-03 RX ADMIN — SENNOSIDES AND DOCUSATE SODIUM 1 TABLET: 50; 8.6 TABLET ORAL at 08:07

## 2025-07-03 RX ADMIN — OXYCODONE HYDROCHLORIDE 10 MG: 5 TABLET ORAL at 08:07

## 2025-07-03 RX ADMIN — GABAPENTIN 800 MG: 400 CAPSULE ORAL at 08:07

## 2025-07-03 RX ADMIN — FAMOTIDINE 20 MG: 20 TABLET, FILM COATED ORAL at 08:07

## 2025-07-03 RX ADMIN — INSULIN ASPART 5 UNITS: 100 INJECTION, SOLUTION INTRAVENOUS; SUBCUTANEOUS at 12:07

## 2025-07-03 RX ADMIN — OXYCODONE HYDROCHLORIDE 5 MG: 5 TABLET ORAL at 08:07

## 2025-07-03 RX ADMIN — ACETAMINOPHEN 500 MG: 500 TABLET ORAL at 03:07

## 2025-07-03 RX ADMIN — ISOSORBIDE MONONITRATE 30 MG: 30 TABLET, EXTENDED RELEASE ORAL at 08:07

## 2025-07-03 RX ADMIN — EZETIMIBE 10 MG: 10 TABLET ORAL at 08:07

## 2025-07-03 RX ADMIN — ACETAMINOPHEN 500 MG: 500 TABLET ORAL at 10:07

## 2025-07-03 RX ADMIN — INSULIN GLARGINE 20 UNITS: 100 INJECTION, SOLUTION SUBCUTANEOUS at 09:07

## 2025-07-03 NOTE — PT/OT/SLP PROGRESS
Physical Therapy Treatment    Patient Name:  Nicol Reese   MRN:  [id number]    Recommendations:     Discharge Recommendations: Moderate Intensity Therapy  Discharge Equipment Recommendations: to be determined by next level of care  Barriers to discharge: awaiting placement    Assessment:     Nicol Reese is a 74 y.o. female admitted with a medical diagnosis of Cervical disc disease with myelopathy.  She presents with the following impairments/functional limitations: weakness, impaired endurance, impaired self care skills, impaired functional mobility, gait instability, impaired balance, decreased lower extremity function, decreased upper extremity function.    Rehab Prognosis: Good; patient would benefit from acute skilled PT services to address these deficits and reach maximum level of function.    Recent Surgery: Procedure(s) (LRB):  LAMINECTOMY, SPINE, CERVICAL, POSTERIOR, W/ ARTHRODESIS (N/A) 1 Day Post-Op    Plan:     During this hospitalization, patient to be seen daily to address the identified rehab impairments via gait training, therapeutic activities, therapeutic exercises, neuromuscular re-education and progress toward the following goals:    Plan of Care Expires:  08/01/25    Subjective     Chief Complaint: Cervical disc disease with myelopathy   Patient/Family Comments/goals: agreeable  Pain/Comfort:  Pain Rating 1: 9/10  Location 1: neck      Objective:     Communicated with RN prior to session.  Patient found HOB elevated with   upon PT entry to room.     General Precautions: Standard, fall  Orthopedic Precautions: spinal precautions  Braces: Cervical collar  Respiratory Status: Room air     Functional Mobility:  Bed Mobility:     Supine to Sit: minimum assistance  Transfers:     Sit to Stand:  contact guard assistance with hand-held assist  Gait: 20ft, 26ft, 64ft with QC and transitioning to SPC, Min A   Balance: Fair in stance, 1 LOB noted requiring Min A to recover      AM-PAC 6 CLICK  MOBILITY  Turning over in bed (including adjusting bedclothes, sheets and blankets)?: 3  Sitting down on and standing up from a chair with arms (e.g., wheelchair, bedside commode, etc.): 3  Moving from lying on back to sitting on the side of the bed?: 3  Moving to and from a bed to a chair (including a wheelchair)?: 3  Need to walk in hospital room?: 3  Climbing 3-5 steps with a railing?: 2  Basic Mobility Total Score: 17       Treatment & Education:  Mobility as stated above.     Patient left up in chair with all lines intact, call button in reach, and daughter present..    GOALS:   Multidisciplinary Problems       Physical Therapy Goals          Problem: Physical Therapy    Goal Priority Disciplines Outcome Interventions   Physical Therapy Goal     PT, PT/OT Progressing    Description: Short term goals:  . Supine to sit with Stand-by Assistance  . Sit to supine with Stand-by Assistance  . Sit to stand transfer with Stand-by Assistance  . Gait  x 150 feet with Contact Guard Assistance using Single-point Cane .     Long term goals:  Patient will gain highest level functional mobility with lowest level of assistive device to return to desired living arrangement and prior ADL's.                          DME Justifications:  To be determined by next facility.     Time Tracking:     PT Received On: 07/03/25  PT Start Time: 1610     PT Stop Time: 1625  PT Total Time (min): 15 min     Billable Minutes: Gait Training 13    Treatment Type: Treatment  PT/PTA: PT     Number of PTA visits since last PT visit: 0     07/03/2025

## 2025-07-03 NOTE — ANESTHESIA POSTPROCEDURE EVALUATION
Anesthesia Post Evaluation    Patient: Nicol Reese    Procedure(s) Performed: Procedure(s) (LRB):  LAMINECTOMY, SPINE, CERVICAL, POSTERIOR, W/ ARTHRODESIS (N/A)    Final Anesthesia Type: general      Patient location during evaluation: PACU  Post-procedure vital signs: reviewed and stable  Pain management: adequate  Airway patency: patent    PONV status at discharge: No PONV  Anesthetic complications: no      Cardiovascular status: hemodynamically stable  Respiratory status: unassisted  Hydration status: euvolemic  Follow-up not needed.              Vitals Value Taken Time   /57 07/03/25 12:19   Temp 36.4 °C (97.6 °F) 07/03/25 12:19   Pulse 75 07/03/25 12:19   Resp 16 07/03/25 12:19   SpO2 90 % 07/03/25 12:19         Event Time   Out of Recovery 07/02/2025 13:28:00         Pain/Aranza Score: Pain Rating Prior to Med Admin: 8 (7/3/2025  9:01 AM)  Pain Rating Post Med Admin: 0 (7/3/2025  5:26 AM)  Aranza Score: 9 (7/2/2025  1:15 PM)

## 2025-07-03 NOTE — PROGRESS NOTES
Pharmacist Renal Dose Adjustment Note    Nicol Reese is a 74 y.o. female being treated with the medication pepcid    Patient Data:    Vital Signs (Most Recent):  Temp: 97.7 °F (36.5 °C) (07/03/25 0817)  Pulse: 79 (07/03/25 0817)  Resp: 18 (07/03/25 0857)  BP: (!) 155/69 (07/03/25 0817)  SpO2: (!) 94 % (07/03/25 0817) Vital Signs (72h Range):  Temp:  [97.2 °F (36.2 °C)-98.1 °F (36.7 °C)]   Pulse:  []   Resp:  [10-18]   BP: (108-186)/(50-99)   SpO2:  [77 %-100 %]      Recent Labs   Lab 07/02/25 0435   CREATININE 0.80     Serum creatinine: 0.8 mg/dL 07/02/25 0435  Estimated creatinine clearance: 56 mL/min    Medication:pepcid dose: 20 frequency bid will be changed to medication:pepcid dose:20 frequency:daily    Pharmacist's Name: Ester Aceves  Pharmacist's Extension: [phone]

## 2025-07-03 NOTE — SUBJECTIVE & OBJECTIVE
Interval history:  NAEO.    Objective:     Vital Signs (Most Recent):  Temp: 98 °F (36.7 °C) (07/03/25 1713)  Pulse: 71 (07/03/25 1713)  Resp: 18 (07/03/25 1713)  BP: (!) 156/74 (07/03/25 1713)  SpO2: 99 % (07/03/25 1713) Vital Signs (24h Range):  Temp:  [97.3 °F (36.3 °C)-98 °F (36.7 °C)] 98 °F (36.7 °C)  Pulse:  [64-79] 71  Resp:  [16-18] 18  SpO2:  [90 %-99 %] 99 %  BP: (108-156)/(50-81) 156/74     Weight: 59.4 kg (131 lb)  Body mass index is 21.14 kg/m².     Physical Exam  Constitutional:       Appearance: Normal appearance.   HENT:      Head: Normocephalic and atraumatic.      Nose: Nose normal.   Eyes:      Extraocular Movements: Extraocular movements intact.      Pupils: Pupils are equal, round, and reactive to light.   Neck:      Comments: Cervical collar in place.  Cardiovascular:      Rate and Rhythm: Normal rate and regular rhythm.   Pulmonary:      Effort: Pulmonary effort is normal.      Breath sounds: Normal breath sounds.   Abdominal:      General: Bowel sounds are normal.      Palpations: Abdomen is soft.   Skin:     General: Skin is warm and dry.   Neurological:      General: No focal deficit present.      Mental Status: She is alert and oriented to person, place, and time.   Psychiatric:         Mood and Affect: Mood normal.         Behavior: Behavior normal.          Significant Labs: All pertinent labs within the past 24 hours have been reviewed.    Significant Imaging: I have reviewed all pertinent imaging results/findings within the past 24 hours.  Review of Systems

## 2025-07-03 NOTE — DISCHARGE INSTRUCTIONS
Office: [phone]      Spine Discharge Instructions    - Follow up with Dr. Alexander in 10-14 days. Please call for appointment (if not already scheduled).   - Keep dressing clean and dry. May remove dressing 3 days after surgery  - May shower upon discharge. Do not scrub, tub bathe, or submerge the incision.  - No lifting greater than 10 pounds.  - Ambulate multiple times each day   - You may bend and twist for usual daily activities  - You may sleep in any position that is comfortable  - Do not drive a motor vehicle while on narcotic pain medication.   - Utilize pain medication (narcotics) as prescribed  - Do not exceed 3g Tylenol in a 24 hour period.   - Use stool softeners while taking narcotics to prevent constipation   - Resume your usual diet       Hospitalist Discharge orders  *Notify your healthcare provider if you experience any of the following: temperature >100.4  *Notify your healthcare provider if you experience any of the following: redness, tenderness, or any signs or symptoms of infection (pain, swelling, redness, odor or green/yellow drainage around incision site).  *Notify your healthcare provider if you experience any of the following: difficulty breathing or increased cough.  *Notify your physician if you experience any persistent nausea, vomiting, diarrhea or headache.  *Notify your physician if you experience any of the following: severe uncontrolled pain, worsening rash, increased confusion, weakness, dizziness, lightheadedness, or visual disturbances.

## 2025-07-03 NOTE — PROGRESS NOTES
Office: [phone]    Subjective:   Pain is well controlled.  Complains of some dysphagia.  Reports some improved movement in the right hand and fingers      Denies nausea, vomiting, chest pain, fevers/chills.    I/O as of 7:32 AM:   Intake/Output - Last 3 Shifts         07/01 0700 07/02 0659 07/02 0700 07/03 0659 07/03 0700 07/04 0659    IV Piggyback 50 1050     Total Intake(mL/kg) 50 (0.8) 1050 (17.7)     Urine (mL/kg/hr) 0 (0)      Blood 30      Total Output 30      Net +20 +1050            Unmeasured Urine Occurrence 3 x               Vitals / Physical Exam:  Vitals:    07/02/25 2350 07/03/25 0027 07/03/25 0408 07/03/25 0426   BP: (!) 145/81  (!) 143/73    BP Location:       Patient Position:       Pulse: 68  64    Resp:  18  18   Temp: 97.3 °F (36.3 °C)  97.6 °F (36.4 °C)    TempSrc: Oral  Oral    SpO2: 95%  98%    Weight:       Height:            AOx3   NAD  Dressing CDI  Spine exam:     Motor  C5 C6 C7 C8 T1   L2 L3 L4 L5 S1      Left  5 5 5 5 5   5 5 5 5 5      Right  3 3 2 1 1   5 5 5 5 5   Sensory                            Left  + + + + +   + + + + +     Right + + + + +   + + + + +         Assessment / Plan:   Nicol Reese is a 74 y.o. female now 2 days status post C3-C5 ACDF and 1 Day Post-Op  s/p Procedure(s) (LRB):  LAMINECTOMY, SPINE, CERVICAL, POSTERIOR, W/ ARTHRODESIS (N/A).    - medical comanagement per hospitalist  - Progressing postoperatively  - Please keep dressing clean, dry and intact; change as needed  - Please continue Diet Consistent Carbohydrate 2000 Calories (up to 75 gm per meal); Standard Tray diet   - Pain at this time is well controlled; continue current pain regiment  - TEDs/SCDs  - PT  - Bowel regimen  - Please call should you have any questions regarding this patient's care      Signature:  Wiley Alexander MD     Electronic Signature

## 2025-07-03 NOTE — PROGRESS NOTES
[facility]  [facility] Medicine  Progress Note    Patient Name: Nicol Reese  MRN: [id number]  Patient Class: IP- Inpatient   Admission Date: 7/1/2025  Length of Stay: 2 days  Attending Physician: Wiley Alexander MD  Primary Care Provider: Ga Mazariegos MD        Subjective     Principal Problem:Cervical disc disease with myelopathy        HPI:  Mrs. Reese is a 73 yo female with t2dm, hld, cervical spondylosis, and cervical disc disease with myelopathy.  She underwent anterior cervical diskectomy and fusion of C3-C5. Tolerated procedure without complications. Hospital medicine has been consulted for med management.  Appropriate home meds have been resumed.    Overview/Hospital Course:  7/2 - scheduled to go back to OR today for 2nd procedure. Hyperglycemia noted. Lantus ordered.    7/3 - returned to OR yesterday for laminectomy of cervical spine. Awaiting rehab.    Interval history:  NAEO.    Objective:     Vital Signs (Most Recent):  Temp: 98 °F (36.7 °C) (07/03/25 1713)  Pulse: 71 (07/03/25 1713)  Resp: 18 (07/03/25 1713)  BP: (!) 156/74 (07/03/25 1713)  SpO2: 99 % (07/03/25 1713) Vital Signs (24h Range):  Temp:  [97.3 °F (36.3 °C)-98 °F (36.7 °C)] 98 °F (36.7 °C)  Pulse:  [64-79] 71  Resp:  [16-18] 18  SpO2:  [90 %-99 %] 99 %  BP: (108-156)/(50-81) 156/74     Weight: 59.4 kg (131 lb)  Body mass index is 21.14 kg/m².     Physical Exam  Constitutional:       Appearance: Normal appearance.   HENT:      Head: Normocephalic and atraumatic.      Nose: Nose normal.   Eyes:      Extraocular Movements: Extraocular movements intact.      Pupils: Pupils are equal, round, and reactive to light.   Neck:      Comments: Cervical collar in place.  Cardiovascular:      Rate and Rhythm: Normal rate and regular rhythm.   Pulmonary:      Effort: Pulmonary effort is normal.      Breath sounds: Normal breath sounds.   Abdominal:      General: Bowel sounds are normal.      Palpations: Abdomen is soft.   Skin:    "  General: Skin is warm and dry.   Neurological:      General: No focal deficit present.      Mental Status: She is alert and oriented to person, place, and time.   Psychiatric:         Mood and Affect: Mood normal.         Behavior: Behavior normal.          Significant Labs: All pertinent labs within the past 24 hours have been reviewed.    Significant Imaging: I have reviewed all pertinent imaging results/findings within the past 24 hours.  Review of Systems      Assessment & Plan  Cervical disc disease with myelopathy  S/p anterior cervical diskectomy with fusion of C3-C5  Ortho spine managing.  PRN analgesics.  PT/OT.    Diabetes mellitus, type 2  Patient's FSGs are uncontrolled due to hyperglycemia on current medication regimen.  Last A1c reviewed-   Lab Results   Component Value Date    HGBA1C 9 (H) 05/27/2025     Most recent fingerstick glucose reviewed- No results for input(s): "POCTGLUCOSE" in the last 24 hours.  Current correctional scale  Medium  Maintain anti-hyperglycemic dose as follows-   Antihyperglycemics (From admission, onward)      Start     Stop Route Frequency Ordered    07/02/25 0900  insulin glargine U-100 (Lantus) injection 20 Units         -- SubQ Daily 07/02/25 0702    07/01/25 1110  insulin aspart U-100 injection 1-10 Units         -- SubQ Before meals & nightly PRN 07/01/25 1111          Hold Oral hypoglycemics while patient is in the hospital.  Hyperlipidemia  Continue ezetimibe.  Cervical spondylosis  S/p anterior cervical diskectomy with fusion of C3-C5  Ortho spine managing.  PRN analgesics.  PT/OT  Hypertension  Patient's blood pressure range in the last 24 hours was: BP  Min: 108/50  Max: 156/74.The patient's inpatient anti-hypertensive regimen is listed below:  Current Antihypertensives  isosorbide mononitrate 24 hr tablet 30 mg, Daily, Oral    Plan  - BP is controlled, no changes needed to their regimen  - Routine vital signs.    VTE Risk Mitigation (From admission, onward)      "      Ordered     Place MARIANA hose  Until discontinued         07/01/25 1109     Place sequential compression device  Until discontinued         07/01/25 1109                    Discharge Planning   JULIET: 7/7/2025     Code Status: Full Code   Medical Readiness for Discharge Date:   Discharge Plan A: Skilled Nursing Facility                  Please place Justification for DME      Zoe Armas DO  Department of Hospital Medicine   [facility]

## 2025-07-03 NOTE — ASSESSMENT & PLAN NOTE
Patient's blood pressure range in the last 24 hours was: BP  Min: 108/50  Max: 156/74.The patient's inpatient anti-hypertensive regimen is listed below:  Current Antihypertensives  isosorbide mononitrate 24 hr tablet 30 mg, Daily, Oral    Plan  - BP is controlled, no changes needed to their regimen  - Routine vital signs.

## 2025-07-03 NOTE — PT/OT/SLP PROGRESS
Occupational Therapy   Treatment    Name: Nicol Reese  MRN: [id number]  Admitting Diagnosis:  Cervical disc disease with myelopathy  1 Day Post-Op    Recommendations:     Discharge Recommendations: Moderate Intensity Therapy  Discharge Equipment Recommendations:  to be determined by next level of care  Barriers to discharge:  None    Assessment:     Nicol Reese is a 74 y.o. female with a medical diagnosis of Cervical disc disease with myelopathy.  She presents with RUE weakness. Performance deficits affecting function are weakness, impaired endurance, impaired self care skills, impaired functional mobility, gait instability, impaired balance, decreased upper extremity function.     Rehab Prognosis:  Good; patient would benefit from acute skilled OT services to address these deficits and reach maximum level of function.       Plan:     Patient to be seen 5 x/week to address the above listed problems via self-care/home management, therapeutic activities, therapeutic exercises  Plan of Care Expires: 07/29/25  Plan of Care Reviewed with: patient    Subjective     Chief Complaint: cervical disc disease with myelopathy  Patient/Family Comments/goals: pt agreeable to OT tx  Pain/Comfort:  Pain Rating 1: 6/10  Location 1: neck  Pain Addressed 1: Pre-medicate for activity    Objective:     Communicated with: YVETTE Nix prior to session.  Patient found HOB elevated with peripheral IV upon OT entry to room.    General Precautions: Standard, fall    Orthopedic Precautions:spinal precautions  Braces: Cervical collar  Respiratory Status: Room air     Occupational Performance:     Bed Mobility:    Not performed     Functional Mobility/Transfers:  Not performed    Activities of Daily Living:  Not performed      [facility] 6 Click ADL:      Treatment & Education:  Pt performed 2 sets x 10 reps each RUE AAROM shoulder flexion, IR/ER, elbow flexion/extension, pronation/supination, wrist flexion/extension, and finger flexion/extension in  order to improve RUE strength. Pt with improvements noted with finger flexion from initial eval.     Patient left HOB elevated with all lines intact and call button in reach    GOALS:   Multidisciplinary Problems       Occupational Therapy Goals          Problem: Occupational Therapy    Goal Priority Disciplines Outcome Interventions   Occupational Therapy Goal     OT, PT/OT Progressing    Description: ST.Pt will perform bathing with Balwinder with setup at EOB  2.Pt will perform UE dressing with Balwinder  3.Pt will perform LE dressing with Balwinder  4.Pt will transfer bed/chair/bsc with CGA with cane  5.Pt will perform standing task x 2 min with CGA with cane  6.Tolerate 15 min of tx without fatigue.      LTG:   Restore to max I with selfcare and mobility.                           DME Justifications:  No DME recommended requiring DME justifications    Time Tracking:     OT Date of Treatment: 25  OT Start Time: 959  OT Stop Time: 103  OT Total Time (min): 33 min    Billable Minutes:Therapeutic Exercise 33 minutes    OT/SHUN: OT          7/3/2025

## 2025-07-03 NOTE — DISCHARGE SUMMARY
[facility]  Orthopedics  Discharge Summary      Patient Name: Nicol Reese  MRN: [id number]  Admission Date: 7/1/2025  Hospital Length of Stay: 2 days  Discharge Date and Time: 7/4/2025  Attending Physician: Wiley Alexander MD   Discharging Provider: Wiley Alexander MD  Primary Care Provider: Ga Mazariegos MD      Procedure(s) (LRB):  LAMINECTOMY, SPINE, CERVICAL, POSTERIOR, W/ ARTHRODESIS (N/A)      Hospital Course:   This is a 74 y.o. year old female who was admitted on 7/1/2025 with diagnoses of Cervical disc disease with myelopathy.  The patient was taken to the operating room on 7/1/2025 for a C3-C5 ACDF.  She returned to the OR on 7/2/2025 for a Procedure(s) (LRB):  LAMINECTOMY, SPINE, CERVICAL, POSTERIOR, W/ ARTHRODESIS (N/A).  See the dictated operative note for full detail. There were no complications during the surgery. The patient was given appropriate hyun-operative antibiotics. Patient had a hemovac drain placed intaoperatively. The patient began working with Physical Therapy on Post op Day #1 who recommended swing bed upon discharge. Patients drain was removed on POD 1 and the patients dressing was changed on POD 1. The patient was placed on mechanical DVT prophylaxis.     Patients labs and vital signs are stable at this time.    Patient feels well and will be discharge today, 7/3/2025.    Consults (From admission, onward)          Status Ordering Provider     Inpatient consult to Hospitalist  Once        Provider:  Eugene Doss MD    Completed WILEY ALEXANDER              Final Active Diagnoses:    Diagnosis Date Noted POA    PRINCIPAL PROBLEM:  Cervical disc disease with myelopathy [M50.00] 07/01/2025 Yes    Hypertension [I10] 07/02/2025 Yes     Chronic    Cervical spondylosis [M47.812] 07/01/2025 Yes    Hyperlipidemia [E78.5]  Yes    Diabetes mellitus, type 2 [E11.9] 03/28/2022 Yes      Problems Resolved During this Admission:      Discharged Condition:  good    Disposition: Swing Bed    Follow Up:    Patient Instructions:      Diet general   Order Comments: Resume home diet     Lifting restrictions   Order Comments: No heavy lifting or strenuous activity     No driving, operating heavy equipment or signing legal documents while taking pain medication     Other restrictions (specify):   Order Comments: [facility]  Office: [phone]    Spine Discharge Instructions    - Follow up with Dr. Alexander in 10-14 days. Please call for appointment (if not already scheduled).   - Keep dressing clean and dry. May remove dressing 3 days after surgery  - May shower upon discharge. Do not scrub, tub bathe, or submerge the incision.  - No lifting greater than 10 pounds.  - Ambulate multiple times each day   - You may bend and twist for usual daily activities  - You may sleep in any position that is comfortable  - Do not drive a motor vehicle while on narcotic pain medication.   - Utilize pain medication (narcotics) as prescribed  - Do not exceed 3g Tylenol in a 24 hour period.   - Use stool softeners while taking narcotics to prevent constipation   - Resume your usual diet     Wound care routine (specify)   Order Comments: Remove Tegaderm and gauze dressing in 72 hours.  Leave Dermabond mesh glued onto skin.  Okay to shower with running water, no soaking or submerging.     Call MD for:  temperature >100.4     Call MD for:  persistent nausea and vomiting     Call MD for:  severe uncontrolled pain     Call MD for:  difficulty breathing, headache or visual disturbances     Call MD for:  redness, tenderness, or signs of infection (pain, swelling, redness, odor or green/yellow discharge around incision site)     Call MD for:  hives     Call MD for:  persistent dizziness or light-headedness     Call MD for:  extreme fatigue     Medications:  Reconciled Home Medications:      Medication List        CONTINUE taking these medications      aspirin 81 MG Chew  Take 81 mg by mouth once  daily.     calcium carbonate 600 mg calcium (1,500 mg) Tab  Commonly known as: OS-KUSH  Take 600 mg by mouth once daily.     cholecalciferol (vitamin D3) 10 mcg (400 unit) Tab  Commonly known as: VITAMIN D3  Take 400 Units by mouth once daily.     cilostazoL 100 MG Tab  Commonly known as: PLETAL  Take 100 mg by mouth 2 (two) times daily.     diclofenac-misoprostol  mg-mcg  mg-mcg per tablet  Commonly known as: ARTHROTEC 75  Take 1 tablet by mouth 2 (two) times daily.     estradioL 0.5 MG tablet  Commonly known as: ESTRACE  Take 0.5 mg by mouth once daily.     ezetimibe 10 mg tablet  Commonly known as: ZETIA  Take 1 tablet (10 mg total) by mouth every evening. Take one tablet by mouth daily     fenofibrate 145 MG tablet  Commonly known as: TRICOR  Take 145 mg by mouth once daily.     FLUoxetine 10 MG capsule  Take 10 mg by mouth once daily.     * gabapentin 800 MG tablet  Commonly known as: NEURONTIN  Take 800 mg by mouth 2 (two) times daily.     * gabapentin 600 MG tablet  Commonly known as: NEURONTIN  Take 600 mg by mouth 2 (two) times daily.     glipiZIDE 5 MG Tr24  Take 5 mg by mouth daily with breakfast.     HYDROcodone-acetaminophen 5-325 mg per tablet  Commonly known as: NORCO  Take 1 tablet by mouth daily as needed.     insulin glargine U-100 (Lantus) 100 unit/mL (3 mL) Inpn pen  Commonly known as: LANTUS SOLOSTAR U-100 INSULIN  Inject 30 units sq once daily     isosorbide mononitrate 30 MG 24 hr tablet  Commonly known as: IMDUR  Take 1 tablet (30 mg total) by mouth once daily.     loratadine 10 mg tablet  Commonly known as: CLARITIN  Take 10 mg by mouth once daily.     metFORMIN 1000 MG tablet  Commonly known as: GLUCOPHAGE  Take 1 tablet (1,000 mg total) by mouth 2 (two) times daily with meals.     methocarbamoL 500 MG Tab  Commonly known as: Robaxin  Take one tablet by mouth every 6 hours as needed     TRULICITY 3 mg/0.5 mL pen injector  Generic drug: dulaglutide  Inject 3 mg into the skin every  7 days.           * This list has 2 medication(s) that are the same as other medications prescribed for you. Read the directions carefully, and ask your doctor or other care provider to review them with you.                  Wiley Alexander MD  Orthopedics  [facility]

## 2025-07-03 NOTE — PLAN OF CARE
Pt is accepted to [facility] for SNF for Friday. CM notified MD that pt will need a chest x ray ordered.

## 2025-07-04 LAB
GLUCOSE SERPL-MCNC: 147 MG/DL (ref 70–105)
GLUCOSE SERPL-MCNC: 208 MG/DL (ref 70–105)
GLUCOSE SERPL-MCNC: 221 MG/DL (ref 70–105)
GLUCOSE SERPL-MCNC: 274 MG/DL (ref 70–105)

## 2025-07-04 PROCEDURE — 63600175 PHARM REV CODE 636 W HCPCS: Performed by: ORTHOPAEDIC SURGERY

## 2025-07-04 PROCEDURE — 82962 GLUCOSE BLOOD TEST: CPT

## 2025-07-04 PROCEDURE — 99900035 HC TECH TIME PER 15 MIN (STAT)

## 2025-07-04 PROCEDURE — 25000242 PHARM REV CODE 250 ALT 637 W/ HCPCS: Performed by: ORTHOPAEDIC SURGERY

## 2025-07-04 PROCEDURE — 11000001 HC ACUTE MED/SURG PRIVATE ROOM

## 2025-07-04 PROCEDURE — 99233 SBSQ HOSP IP/OBS HIGH 50: CPT | Mod: ,,, | Performed by: FAMILY MEDICINE

## 2025-07-04 PROCEDURE — 97110 THERAPEUTIC EXERCISES: CPT

## 2025-07-04 PROCEDURE — 25000003 PHARM REV CODE 250: Performed by: FAMILY MEDICINE

## 2025-07-04 PROCEDURE — 25000003 PHARM REV CODE 250: Performed by: ORTHOPAEDIC SURGERY

## 2025-07-04 PROCEDURE — 94761 N-INVAS EAR/PLS OXIMETRY MLT: CPT

## 2025-07-04 PROCEDURE — 63600175 PHARM REV CODE 636 W HCPCS: Performed by: FAMILY MEDICINE

## 2025-07-04 RX ORDER — CEFAZOLIN 2 G/1
2 INJECTION, POWDER, FOR SOLUTION INTRAMUSCULAR; INTRAVENOUS
Status: COMPLETED | OUTPATIENT
Start: 2025-07-04 | End: 2025-07-04

## 2025-07-04 RX ORDER — SIMETHICONE 80 MG
1 TABLET,CHEWABLE ORAL 3 TIMES DAILY PRN
Status: DISCONTINUED | OUTPATIENT
Start: 2025-07-04 | End: 2025-07-05 | Stop reason: HOSPADM

## 2025-07-04 RX ORDER — LOSARTAN POTASSIUM 50 MG/1
50 TABLET ORAL DAILY
Status: DISCONTINUED | OUTPATIENT
Start: 2025-07-04 | End: 2025-07-05 | Stop reason: HOSPADM

## 2025-07-04 RX ORDER — LIDOCAINE HYDROCHLORIDE 20 MG/ML
15 SOLUTION OROPHARYNGEAL ONCE
Status: COMPLETED | OUTPATIENT
Start: 2025-07-04 | End: 2025-07-04

## 2025-07-04 RX ORDER — HYDRALAZINE HYDROCHLORIDE 20 MG/ML
10 INJECTION INTRAMUSCULAR; INTRAVENOUS EVERY 6 HOURS PRN
Status: DISCONTINUED | OUTPATIENT
Start: 2025-07-04 | End: 2025-07-05 | Stop reason: HOSPADM

## 2025-07-04 RX ORDER — ALUMINUM HYDROXIDE, MAGNESIUM HYDROXIDE, AND SIMETHICONE 1200; 120; 1200 MG/30ML; MG/30ML; MG/30ML
30 SUSPENSION ORAL ONCE
Status: COMPLETED | OUTPATIENT
Start: 2025-07-04 | End: 2025-07-04

## 2025-07-04 RX ADMIN — METHOCARBAMOL 500 MG: 500 TABLET ORAL at 09:07

## 2025-07-04 RX ADMIN — ALUMINUM HYDROXIDE, MAGNESIUM HYDROXIDE, AND SIMETHICONE 30 ML: 200; 200; 20 SUSPENSION ORAL at 11:07

## 2025-07-04 RX ADMIN — OXYCODONE HYDROCHLORIDE 10 MG: 5 TABLET ORAL at 12:07

## 2025-07-04 RX ADMIN — ISOSORBIDE MONONITRATE 30 MG: 30 TABLET, EXTENDED RELEASE ORAL at 09:07

## 2025-07-04 RX ADMIN — FAMOTIDINE 20 MG: 20 TABLET, FILM COATED ORAL at 09:07

## 2025-07-04 RX ADMIN — LIDOCAINE HYDROCHLORIDE 15 ML: 20 SOLUTION ORAL at 11:07

## 2025-07-04 RX ADMIN — INSULIN ASPART 4 UNITS: 100 INJECTION, SOLUTION INTRAVENOUS; SUBCUTANEOUS at 05:07

## 2025-07-04 RX ADMIN — OXYCODONE HYDROCHLORIDE 10 MG: 5 TABLET ORAL at 05:07

## 2025-07-04 RX ADMIN — CETIRIZINE HYDROCHLORIDE 10 MG: 10 TABLET, FILM COATED ORAL at 09:07

## 2025-07-04 RX ADMIN — CALCIUM CARBONATE (ANTACID) CHEW TAB 500 MG 500 MG: 500 CHEW TAB at 09:07

## 2025-07-04 RX ADMIN — DOCUSATE SODIUM 100 MG: 100 CAPSULE, LIQUID FILLED ORAL at 08:07

## 2025-07-04 RX ADMIN — CEFAZOLIN 2 G: 2 INJECTION, POWDER, FOR SOLUTION INTRAMUSCULAR; INTRAVENOUS at 11:07

## 2025-07-04 RX ADMIN — FLUOXETINE HYDROCHLORIDE 10 MG: 10 CAPSULE ORAL at 09:07

## 2025-07-04 RX ADMIN — ACETAMINOPHEN 500 MG: 500 TABLET ORAL at 02:07

## 2025-07-04 RX ADMIN — OXYCODONE HYDROCHLORIDE 10 MG: 5 TABLET ORAL at 11:07

## 2025-07-04 RX ADMIN — SENNOSIDES AND DOCUSATE SODIUM 1 TABLET: 50; 8.6 TABLET ORAL at 09:07

## 2025-07-04 RX ADMIN — OXYCODONE HYDROCHLORIDE 10 MG: 5 TABLET ORAL at 04:07

## 2025-07-04 RX ADMIN — CEFAZOLIN 2 G: 2 INJECTION, POWDER, FOR SOLUTION INTRAMUSCULAR; INTRAVENOUS at 08:07

## 2025-07-04 RX ADMIN — POLYETHYLENE GLYCOL 3350 17 G: 17 POWDER, FOR SOLUTION ORAL at 09:07

## 2025-07-04 RX ADMIN — GABAPENTIN 800 MG: 400 CAPSULE ORAL at 02:07

## 2025-07-04 RX ADMIN — LOSARTAN POTASSIUM 50 MG: 50 TABLET, FILM COATED ORAL at 11:07

## 2025-07-04 RX ADMIN — OXYCODONE HYDROCHLORIDE 5 MG: 5 TABLET ORAL at 08:07

## 2025-07-04 RX ADMIN — METHOCARBAMOL 500 MG: 500 TABLET ORAL at 02:07

## 2025-07-04 RX ADMIN — INSULIN GLARGINE 20 UNITS: 100 INJECTION, SOLUTION SUBCUTANEOUS at 09:07

## 2025-07-04 RX ADMIN — SENNOSIDES AND DOCUSATE SODIUM 1 TABLET: 50; 8.6 TABLET ORAL at 08:07

## 2025-07-04 RX ADMIN — METHOCARBAMOL 500 MG: 500 TABLET ORAL at 08:07

## 2025-07-04 RX ADMIN — ACETAMINOPHEN 500 MG: 500 TABLET ORAL at 09:07

## 2025-07-04 RX ADMIN — INSULIN ASPART 3 UNITS: 100 INJECTION, SOLUTION INTRAVENOUS; SUBCUTANEOUS at 09:07

## 2025-07-04 RX ADMIN — DOCUSATE SODIUM 100 MG: 100 CAPSULE, LIQUID FILLED ORAL at 09:07

## 2025-07-04 RX ADMIN — Medication 400 UNITS: at 09:07

## 2025-07-04 RX ADMIN — GABAPENTIN 800 MG: 400 CAPSULE ORAL at 09:07

## 2025-07-04 RX ADMIN — GABAPENTIN 800 MG: 400 CAPSULE ORAL at 08:07

## 2025-07-04 RX ADMIN — ACETAMINOPHEN 500 MG: 500 TABLET ORAL at 05:07

## 2025-07-04 RX ADMIN — EZETIMIBE 10 MG: 10 TABLET ORAL at 08:07

## 2025-07-04 RX ADMIN — OXYCODONE HYDROCHLORIDE 10 MG: 5 TABLET ORAL at 09:07

## 2025-07-04 NOTE — ASSESSMENT & PLAN NOTE
S/p anterior cervical diskectomy with fusion of C3-C5 and posterolateral fusion and laminectomy with decompression C3-C5  Ortho spine managing.  PRN analgesics.  PT/OT

## 2025-07-04 NOTE — PT/OT/SLP PROGRESS
Physical Therapy Treatment    Patient Name:  Nicol Reese   MRN:  [id number]    Recommendations:     Discharge Recommendations: Moderate Intensity Therapy  Discharge Equipment Recommendations: to be determined by next level of care  Barriers to discharge: Inaccessible home and Decreased caregiver support    Assessment:     Nicol Reese is a 74 y.o. female admitted with a medical diagnosis of Cervical disc disease with myelopathy.  She presents with the following impairments/functional limitations: weakness, impaired endurance, impaired self care skills, decreased upper extremity function, impaired functional mobility, decreased lower extremity function, pain Pt having increased pain in shoulders today. Ambulated well with cane but still having RUE weakness. Accepted to [facility] for today.    Rehab Prognosis: Good; patient would benefit from acute skilled PT services to address these deficits and reach maximum level of function.    Recent Surgery: Procedure(s) (LRB):  LAMINECTOMY, SPINE, CERVICAL, POSTERIOR, W/ ARTHRODESIS (N/A) 2 Days Post-Op    Plan:     During this hospitalization, patient to be seen daily to address the identified rehab impairments via gait training, therapeutic activities, therapeutic exercises, neuromuscular re-education and progress toward the following goals:    Plan of Care Expires:  08/01/25    Subjective     Chief Complaint: right UE weakness  Patient/Family Comments/goals: Pt is agreeable to PT   Pain/Comfort:  Pain Rating 1: 10/10  Location - Orientation 1: posterior  Location 1: neck  Pain Addressed 1: Pre-medicate for activity, Reposition  Pain Rating Post-Intervention 1: 9/10      Objective:     Communicated with YAMILETH Jeffries Rn prior to session.  Patient found HOB elevated with peripheral IV, hemovac upon PT entry to room.     General Precautions: Standard, fall  Orthopedic Precautions: spinal precautions  Braces: Cervical collar  Respiratory Status: Room air     Functional Mobility:  Bed  Mobility:     Scooting: contact guard assistance  Transfers:     Sit to Stand:  minimum assistance with straight cane  Gait: 75 ft contact guard assistance with cane, wide AMARJIT, short step length  Balance: fair      AM-PAC 6 CLICK MOBILITY  Turning over in bed (including adjusting bedclothes, sheets and blankets)?: 3  Sitting down on and standing up from a chair with arms (e.g., wheelchair, bedside commode, etc.): 3  Moving from lying on back to sitting on the side of the bed?: 3  Moving to and from a bed to a chair (including a wheelchair)?: 3  Need to walk in hospital room?: 3  Climbing 3-5 steps with a railing?: 2  Basic Mobility Total Score: 17       Treatment & Education:  Pt performed bilateral LE: seated exercises: ankle pumps, long arc quads, and marches x 10 each  Ambulation as noted above      Patient left up in chair with all lines intact and call button in reach..    GOALS:   Multidisciplinary Problems       Physical Therapy Goals          Problem: Physical Therapy    Goal Priority Disciplines Outcome Interventions   Physical Therapy Goal     PT, PT/OT Progressing    Description: Short term goals:  . Supine to sit with Stand-by Assistance  . Sit to supine with Stand-by Assistance  . Sit to stand transfer with Stand-by Assistance  . Gait  x 150 feet with Contact Guard Assistance using Single-point Cane .     Long term goals:  Patient will gain highest level functional mobility with lowest level of assistive device to return to desired living arrangement and prior ADL's.                          DME Justifications:  No DME recommended requiring DME justifications    Time Tracking:     PT Received On: 07/04/25  PT Start Time: 0834     PT Stop Time: 0853  PT Total Time (min): 19 min     Billable Minutes: Therapeutic Exercise 19    Treatment Type: Treatment  PT/PTA: PT     Number of PTA visits since last PT visit: 0     07/04/2025

## 2025-07-04 NOTE — PROGRESS NOTES
[facility]  [facility] Medicine  Progress Note    Patient Name: Nicol Reese  MRN: [id number]  Patient Class: IP- Inpatient   Admission Date: 7/1/2025  Length of Stay: 3 days  Attending Physician: Wiley Alexander MD  Primary Care Provider: Ga Mazariegos MD        Subjective     Principal Problem:Cervical disc disease with myelopathy        HPI:  Mrs. Reese is a 75 yo female with t2dm, hld, cervical spondylosis, and cervical disc disease with myelopathy.  She underwent anterior cervical diskectomy and fusion of C3-C5. Tolerated procedure without complications. Hospital medicine has been consulted for med management.  Appropriate home meds have been resumed.    Overview/Hospital Course:  75 yo female admitted with cervical spondylosis and cervical disk disease with myelopathy s/p anterior cervical diskectomy with fusion of C3-C5 and posterolateral fusion and laminectomy with decompression C3-C5.  Hospital med consulted for medical management.    7/4 - progressing well with PT/OT. Bp uncontrolled. Changes made to med regimen.  Was scheduled to go to rehab today; however, they can't accept patient until tomorrow.    Interval history:  NAEO.    Objective:     Vital Signs (Most Recent):  Temp: 97.5 °F (36.4 °C) (07/04/25 1157)  Pulse: 98 (07/04/25 1157)  Resp: 18 (07/04/25 1157)  BP: (!) 169/87 (07/04/25 1157)  SpO2: (!) 93 % (07/04/25 1157) Vital Signs (24h Range):  Temp:  [97.4 °F (36.3 °C)-98.2 °F (36.8 °C)] 97.5 °F (36.4 °C)  Pulse:  [64-98] 98  Resp:  [16-20] 18  SpO2:  [93 %-99 %] 93 %  BP: (155-199)/(74-95) 169/87     Weight: 59.4 kg (130 lb 15.3 oz)  Body mass index is 21.14 kg/m².     Physical Exam  Constitutional:       Appearance: Normal appearance.   HENT:      Head: Normocephalic and atraumatic.      Nose: Nose normal.   Eyes:      Extraocular Movements: Extraocular movements intact.      Pupils: Pupils are equal, round, and reactive to light.   Neck:      Comments: Dressing noted to  "anterior and posterior neck  Cardiovascular:      Rate and Rhythm: Normal rate and regular rhythm.   Pulmonary:      Effort: Pulmonary effort is normal.      Breath sounds: Normal breath sounds.   Abdominal:      General: Bowel sounds are normal.      Palpations: Abdomen is soft.   Skin:     General: Skin is warm and dry.   Neurological:      General: No focal deficit present.      Mental Status: She is alert and oriented to person, place, and time.   Psychiatric:         Mood and Affect: Mood normal.         Behavior: Behavior normal.          Significant Labs: All pertinent labs within the past 24 hours have been reviewed.    Significant Imaging: I have reviewed all pertinent imaging results/findings within the past 24 hours.  Review of Systems      Assessment & Plan  Cervical disc disease with myelopathy  S/p anterior cervical diskectomy with fusion of C3-C5 and posterolateral fusion and laminectomy with decompression C3-C5.  Ortho spine managing.  PRN analgesics.  PT/OT.    Diabetes mellitus, type 2  Patient's FSGs are uncontrolled due to hyperglycemia on current medication regimen.  Last A1c reviewed-   Lab Results   Component Value Date    HGBA1C 9 (H) 05/27/2025     Most recent fingerstick glucose reviewed- No results for input(s): "POCTGLUCOSE" in the last 24 hours.  Current correctional scale  Medium  Maintain anti-hyperglycemic dose as follows-   Antihyperglycemics (From admission, onward)      Start     Stop Route Frequency Ordered    07/02/25 0900  insulin glargine U-100 (Lantus) injection 20 Units         -- SubQ Daily 07/02/25 0702    07/01/25 1110  insulin aspart U-100 injection 1-10 Units         -- SubQ Before meals & nightly PRN 07/01/25 1111          Hold Oral hypoglycemics while patient is in the hospital.  Hyperlipidemia  Continue ezetimibe.  Cervical spondylosis  S/p anterior cervical diskectomy with fusion of C3-C5 and posterolateral fusion and laminectomy with decompression C3-C5  Ortho spine " managing.  PRN analgesics.  PT/OT  Hypertension  Patient's blood pressure range in the last 24 hours was: BP  Min: 155/85  Max: 199/95.The patient's inpatient anti-hypertensive regimen is listed below:  Current Antihypertensives  isosorbide mononitrate 24 hr tablet 30 mg, Daily, Oral  hydrALAZINE injection 10 mg, Every 6 hours PRN, Intravenous  losartan tablet 50 mg, Daily, Oral    Plan  - BP is uncontrolled, will adjust as follows: add losartan to regimen.  - Routine vital signs.  VTE Risk Mitigation (From admission, onward)           Ordered     Place MARIANA hose  Until discontinued         07/01/25 1109     Place sequential compression device  Until discontinued         07/01/25 1109                    Discharge Planning   JULIET: 7/4/2025     Code Status: Full Code   Medical Readiness for Discharge Date: 7/4/2025  Discharge Plan A: Skilled Nursing Facility                  Please place Justification for DME      Zoe Armas DO  Department of Hospital Medicine   [facility]

## 2025-07-04 NOTE — ASSESSMENT & PLAN NOTE
S/p anterior cervical diskectomy with fusion of C3-C5 and posterolateral fusion and laminectomy with decompression C3-C5.  Ortho spine managing.  PRN analgesics.  PT/OT.

## 2025-07-04 NOTE — NURSING
"1030 attempted to call [facility] and they said they are unable to admit her today r/t staff and the holiday. States she can d/c tomorrow and they will admit her. V/u. Asked nurse her name and she states "yair may" Secure chatted hospitalist, surgeon who is covering, and nursing supervisor. Charge nurse is aware.  "

## 2025-07-04 NOTE — ASSESSMENT & PLAN NOTE
Patient's blood pressure range in the last 24 hours was: BP  Min: 155/85  Max: 199/95.The patient's inpatient anti-hypertensive regimen is listed below:  Current Antihypertensives  isosorbide mononitrate 24 hr tablet 30 mg, Daily, Oral  hydrALAZINE injection 10 mg, Every 6 hours PRN, Intravenous  losartan tablet 50 mg, Daily, Oral    Plan  - BP is uncontrolled, will adjust as follows: add losartan to regimen.  - Routine vital signs.

## 2025-07-04 NOTE — SUBJECTIVE & OBJECTIVE
Interval history:  NAEO.    Objective:     Vital Signs (Most Recent):  Temp: 97.5 °F (36.4 °C) (07/04/25 1157)  Pulse: 98 (07/04/25 1157)  Resp: 18 (07/04/25 1157)  BP: (!) 169/87 (07/04/25 1157)  SpO2: (!) 93 % (07/04/25 1157) Vital Signs (24h Range):  Temp:  [97.4 °F (36.3 °C)-98.2 °F (36.8 °C)] 97.5 °F (36.4 °C)  Pulse:  [64-98] 98  Resp:  [16-20] 18  SpO2:  [93 %-99 %] 93 %  BP: (155-199)/(74-95) 169/87     Weight: 59.4 kg (130 lb 15.3 oz)  Body mass index is 21.14 kg/m².     Physical Exam  Constitutional:       Appearance: Normal appearance.   HENT:      Head: Normocephalic and atraumatic.      Nose: Nose normal.   Eyes:      Extraocular Movements: Extraocular movements intact.      Pupils: Pupils are equal, round, and reactive to light.   Neck:      Comments: Dressing noted to anterior and posterior neck  Cardiovascular:      Rate and Rhythm: Normal rate and regular rhythm.   Pulmonary:      Effort: Pulmonary effort is normal.      Breath sounds: Normal breath sounds.   Abdominal:      General: Bowel sounds are normal.      Palpations: Abdomen is soft.   Skin:     General: Skin is warm and dry.   Neurological:      General: No focal deficit present.      Mental Status: She is alert and oriented to person, place, and time.   Psychiatric:         Mood and Affect: Mood normal.         Behavior: Behavior normal.          Significant Labs: All pertinent labs within the past 24 hours have been reviewed.    Significant Imaging: I have reviewed all pertinent imaging results/findings within the past 24 hours.  Review of Systems

## 2025-07-05 VITALS
OXYGEN SATURATION: 95 % | TEMPERATURE: 98 F | RESPIRATION RATE: 18 BRPM | BODY MASS INDEX: 21.04 KG/M2 | SYSTOLIC BLOOD PRESSURE: 178 MMHG | DIASTOLIC BLOOD PRESSURE: 93 MMHG | HEIGHT: 66 IN | WEIGHT: 130.94 LBS | HEART RATE: 91 BPM

## 2025-07-05 LAB — GLUCOSE SERPL-MCNC: 134 MG/DL (ref 70–105)

## 2025-07-05 PROCEDURE — 63600175 PHARM REV CODE 636 W HCPCS: Performed by: ORTHOPAEDIC SURGERY

## 2025-07-05 PROCEDURE — 25000003 PHARM REV CODE 250: Performed by: ORTHOPAEDIC SURGERY

## 2025-07-05 PROCEDURE — 99900035 HC TECH TIME PER 15 MIN (STAT)

## 2025-07-05 PROCEDURE — 25000003 PHARM REV CODE 250: Performed by: FAMILY MEDICINE

## 2025-07-05 PROCEDURE — 25000242 PHARM REV CODE 250 ALT 637 W/ HCPCS: Performed by: ORTHOPAEDIC SURGERY

## 2025-07-05 PROCEDURE — 63600175 PHARM REV CODE 636 W HCPCS: Performed by: FAMILY MEDICINE

## 2025-07-05 PROCEDURE — 99233 SBSQ HOSP IP/OBS HIGH 50: CPT | Mod: ,,, | Performed by: FAMILY MEDICINE

## 2025-07-05 PROCEDURE — 94761 N-INVAS EAR/PLS OXIMETRY MLT: CPT

## 2025-07-05 PROCEDURE — 82962 GLUCOSE BLOOD TEST: CPT

## 2025-07-05 RX ORDER — LOSARTAN POTASSIUM 50 MG/1
50 TABLET ORAL DAILY
Qty: 30 TABLET | Refills: 0 | Status: SHIPPED | OUTPATIENT
Start: 2025-07-05 | End: 2026-07-05

## 2025-07-05 RX ADMIN — CETIRIZINE HYDROCHLORIDE 10 MG: 10 TABLET, FILM COATED ORAL at 09:07

## 2025-07-05 RX ADMIN — GABAPENTIN 800 MG: 400 CAPSULE ORAL at 09:07

## 2025-07-05 RX ADMIN — DOCUSATE SODIUM 100 MG: 100 CAPSULE, LIQUID FILLED ORAL at 09:07

## 2025-07-05 RX ADMIN — FAMOTIDINE 20 MG: 20 TABLET, FILM COATED ORAL at 09:07

## 2025-07-05 RX ADMIN — INSULIN GLARGINE 20 UNITS: 100 INJECTION, SOLUTION SUBCUTANEOUS at 09:07

## 2025-07-05 RX ADMIN — ESTRADIOL 0.5 MG: 0.5 TABLET ORAL at 09:07

## 2025-07-05 RX ADMIN — CALCIUM CARBONATE (ANTACID) CHEW TAB 500 MG 500 MG: 500 CHEW TAB at 09:07

## 2025-07-05 RX ADMIN — ACETAMINOPHEN 500 MG: 500 TABLET ORAL at 06:07

## 2025-07-05 RX ADMIN — METHOCARBAMOL 500 MG: 500 TABLET ORAL at 09:07

## 2025-07-05 RX ADMIN — FLUOXETINE HYDROCHLORIDE 10 MG: 10 CAPSULE ORAL at 09:07

## 2025-07-05 RX ADMIN — POLYETHYLENE GLYCOL 3350 17 G: 17 POWDER, FOR SOLUTION ORAL at 09:07

## 2025-07-05 RX ADMIN — ISOSORBIDE MONONITRATE 30 MG: 30 TABLET, EXTENDED RELEASE ORAL at 09:07

## 2025-07-05 RX ADMIN — LOSARTAN POTASSIUM 50 MG: 50 TABLET, FILM COATED ORAL at 09:07

## 2025-07-05 RX ADMIN — SENNOSIDES AND DOCUSATE SODIUM 1 TABLET: 50; 8.6 TABLET ORAL at 09:07

## 2025-07-05 RX ADMIN — OXYCODONE HYDROCHLORIDE 10 MG: 5 TABLET ORAL at 12:07

## 2025-07-05 RX ADMIN — ACETAMINOPHEN 500 MG: 500 TABLET ORAL at 09:07

## 2025-07-05 RX ADMIN — OXYCODONE HYDROCHLORIDE 10 MG: 5 TABLET ORAL at 09:07

## 2025-07-05 RX ADMIN — OXYCODONE HYDROCHLORIDE 10 MG: 5 TABLET ORAL at 03:07

## 2025-07-05 RX ADMIN — Medication 400 UNITS: at 09:07

## 2025-07-05 NOTE — SUBJECTIVE & OBJECTIVE
Interval history:  NAEO.    Objective:     Vital Signs (Most Recent):  Temp: 98 °F (36.7 °C) (07/05/25 0815)  Pulse: 91 (07/05/25 0815)  Resp: 18 (07/05/25 1224)  BP: (!) 178/93 (07/05/25 0815)  SpO2: 95 % (07/05/25 0815) Vital Signs (24h Range):  Temp:  [97.3 °F (36.3 °C)-98.1 °F (36.7 °C)] 98 °F (36.7 °C)  Pulse:  [79-91] 91  Resp:  [17-18] 18  SpO2:  [95 %-100 %] 95 %  BP: (140-178)/(78-93) 178/93     Weight: 59.4 kg (130 lb 15.3 oz)  Body mass index is 21.14 kg/m².     Physical Exam  Constitutional:       Appearance: Normal appearance.   HENT:      Head: Normocephalic and atraumatic.      Nose: Nose normal.   Eyes:      Extraocular Movements: Extraocular movements intact.      Pupils: Pupils are equal, round, and reactive to light.   Neck:      Comments: Dressing noted to anterior and posterior neck  Cardiovascular:      Rate and Rhythm: Normal rate and regular rhythm.   Pulmonary:      Effort: Pulmonary effort is normal.      Breath sounds: Normal breath sounds.   Abdominal:      General: Bowel sounds are normal.      Palpations: Abdomen is soft.   Skin:     General: Skin is warm and dry.   Neurological:      General: No focal deficit present.      Mental Status: She is alert and oriented to person, place, and time.   Psychiatric:         Mood and Affect: Mood normal.         Behavior: Behavior normal.          Significant Labs: All pertinent labs within the past 24 hours have been reviewed.    Significant Imaging: I have reviewed all pertinent imaging results/findings within the past 24 hours.  Review of Systems

## 2025-07-05 NOTE — ASSESSMENT & PLAN NOTE
Patient's blood pressure range in the last 24 hours was: BP  Min: 140/81  Max: 178/93.The patient's inpatient anti-hypertensive regimen is listed below:  Current Antihypertensives  isosorbide mononitrate 24 hr tablet 30 mg, Daily, Oral  hydrALAZINE injection 10 mg, Every 6 hours PRN, Intravenous  losartan tablet 50 mg, Daily, Oral  losartan (COZAAR) tablet, Daily, Oral    Plan  - BP is uncontrolled, will adjust as follows: add losartan to regimen.  - Routine vital signs.  - Continue to monitor and make medication adjustments as indicated.

## 2025-07-05 NOTE — PROGRESS NOTES
[facility]  [facility] Medicine  Progress Note    Patient Name: Nicol Reese  MRN: [id number]  Patient Class: IP- Inpatient   Admission Date: 7/1/2025  Length of Stay: 4 days  Attending Physician: Wiley Alexander MD  Primary Care Provider: Ga Mazariegos MD        Subjective     Principal Problem:Cervical disc disease with myelopathy        HPI:  Mrs. Reese is a 73 yo female with t2dm, hld, cervical spondylosis, and cervical disc disease with myelopathy.  She underwent anterior cervical diskectomy and fusion of C3-C5. Tolerated procedure without complications. Hospital medicine has been consulted for med management.  Appropriate home meds have been resumed.    Overview/Hospital Course:  73 yo female admitted with cervical spondylosis and cervical disk disease with myelopathy s/p anterior cervical diskectomy with fusion of C3-C5 and posterolateral fusion and laminectomy with decompression C3-C5.  Hospital med consulted for medical management.    7/4 - progressing well with PT/OT. Bp uncontrolled. Changes made to med regimen.  Was scheduled to go to rehab today; however, they can't accept patient until tomorrow.    7/5 - bp remains elevated. This will need to monitored further at rehab facility and changes made as indicated.    Interval history:  NAEO.    Objective:     Vital Signs (Most Recent):  Temp: 98 °F (36.7 °C) (07/05/25 0815)  Pulse: 91 (07/05/25 0815)  Resp: 18 (07/05/25 1224)  BP: (!) 178/93 (07/05/25 0815)  SpO2: 95 % (07/05/25 0815) Vital Signs (24h Range):  Temp:  [97.3 °F (36.3 °C)-98.1 °F (36.7 °C)] 98 °F (36.7 °C)  Pulse:  [79-91] 91  Resp:  [17-18] 18  SpO2:  [95 %-100 %] 95 %  BP: (140-178)/(78-93) 178/93     Weight: 59.4 kg (130 lb 15.3 oz)  Body mass index is 21.14 kg/m².     Physical Exam  Constitutional:       Appearance: Normal appearance.   HENT:      Head: Normocephalic and atraumatic.      Nose: Nose normal.   Eyes:      Extraocular Movements: Extraocular movements  "intact.      Pupils: Pupils are equal, round, and reactive to light.   Neck:      Comments: Dressing noted to anterior and posterior neck  Cardiovascular:      Rate and Rhythm: Normal rate and regular rhythm.   Pulmonary:      Effort: Pulmonary effort is normal.      Breath sounds: Normal breath sounds.   Abdominal:      General: Bowel sounds are normal.      Palpations: Abdomen is soft.   Skin:     General: Skin is warm and dry.   Neurological:      General: No focal deficit present.      Mental Status: She is alert and oriented to person, place, and time.   Psychiatric:         Mood and Affect: Mood normal.         Behavior: Behavior normal.          Significant Labs: All pertinent labs within the past 24 hours have been reviewed.    Significant Imaging: I have reviewed all pertinent imaging results/findings within the past 24 hours.  Review of Systems      Assessment & Plan  Cervical disc disease with myelopathy  S/p anterior cervical diskectomy with fusion of C3-C5 and posterolateral fusion and laminectomy with decompression C3-C5.  Ortho spine managing.  PRN analgesics.  PT/OT.    Diabetes mellitus, type 2  Patient's FSGs are uncontrolled due to hyperglycemia on current medication regimen.  Last A1c reviewed-   Lab Results   Component Value Date    HGBA1C 9 (H) 05/27/2025     Most recent fingerstick glucose reviewed- No results for input(s): "POCTGLUCOSE" in the last 24 hours.  Current correctional scale  Medium  Maintain anti-hyperglycemic dose as follows-   Antihyperglycemics (From admission, onward)      Start     Stop Route Frequency Ordered    07/02/25 0900  insulin glargine U-100 (Lantus) injection 20 Units         -- SubQ Daily 07/02/25 0702    07/01/25 1110  insulin aspart U-100 injection 1-10 Units         -- SubQ Before meals & nightly PRN 07/01/25 1111          Hold Oral hypoglycemics while patient is in the hospital.  Hyperlipidemia  Continue ezetimibe.  Cervical spondylosis  S/p anterior cervical " diskectomy with fusion of C3-C5 and posterolateral fusion and laminectomy with decompression C3-C5  Ortho spine managing.  PRN analgesics.  PT/OT  Hypertension  Patient's blood pressure range in the last 24 hours was: BP  Min: 140/81  Max: 178/93.The patient's inpatient anti-hypertensive regimen is listed below:  Current Antihypertensives  isosorbide mononitrate 24 hr tablet 30 mg, Daily, Oral  hydrALAZINE injection 10 mg, Every 6 hours PRN, Intravenous  losartan tablet 50 mg, Daily, Oral  losartan (COZAAR) tablet, Daily, Oral    Plan  - BP is uncontrolled, will adjust as follows: add losartan to regimen.  - Routine vital signs.  - Continue to monitor and make medication adjustments as indicated.    VTE Risk Mitigation (From admission, onward)           Ordered     Place MARIANA hose  Until discontinued         07/01/25 1109     Place sequential compression device  Until discontinued         07/01/25 1109                    Discharge Planning   JULIET: 7/4/2025     Code Status: Full Code   Medical Readiness for Discharge Date: 7/4/2025  Discharge Plan A: Skilled Nursing Facility                  Please place Justification for DME      Zoe Armas DO  Department of Hospital Medicine   [facility]

## 2025-07-05 NOTE — PROGRESS NOTES
Ms. Reese is doing well.  Swing bed was unavailable today.  She is ready for discharge today.    Dressing clean, dry and intact.    Discharge to swing bed today.  Prescription for Percocet 5 was written.

## 2025-07-06 ENCOUNTER — OUTSIDE PLACE OF SERVICE (OUTPATIENT)
Dept: FAMILY MEDICINE | Facility: CLINIC | Age: 75
End: 2025-07-06
Payer: MEDICARE

## 2025-07-06 PROCEDURE — 99304 1ST NF CARE SF/LOW MDM 25: CPT | Mod: ,,, | Performed by: FAMILY MEDICINE

## 2025-07-08 NOTE — PLAN OF CARE
[facility]  Discharge Final Note    Primary Care Provider: Ga Mazariegos MD    Expected Discharge Date: 7/4/2025    Final Discharge Note (most recent)       Final Note - 07/08/25 0814          Final Note    Assessment Type Final Discharge Note     Anticipated Discharge Disposition Skilled Nursing Facility     Hospital Resources/Appts/Education Provided Provided patient/caregiver with written discharge plan information        Post-Acute Status    Post-Acute Authorization Placement     Post-Acute Placement Status Set-up Complete/Auth obtained     Patient choice form signed by patient/caregiver List with quality metrics by geographic area provided;List from CMS Compare;List from System Post-Acute Care     Discharge Delays None known at this time                     Important Message from Medicare  Important Message from Medicare regarding Discharge Appeal Rights: Given to patient/caregiver, Explained to patient/caregiver, Signed/date by patient/caregiver     Date IMM was signed: 07/03/25  Time IMM was signed: 1005    Pt discharged to [facility]

## (undated) DEVICE — FORCEP BAYONET BIPOLAR 10.5 DISP

## (undated) DEVICE — GLOVE SURGICAL PROTEXIS PI SIZE 7.5

## (undated) DEVICE — GLOVE SENSICARE PI GRN 6

## (undated) DEVICE — GLOVE SURGICAL PROTEXIS PI BLUE SIZE 6.0

## (undated) DEVICE — SPONGE COTTON TRAY 4X4IN

## (undated) DEVICE — SUT VICRYL PLUS CT-1 1 8-27IN

## (undated) DEVICE — GLOVE SENSICARE PI SURG 8.5

## (undated) DEVICE — POSITIONER HEAD DONUT 9IN FOAM

## (undated) DEVICE — Device

## (undated) DEVICE — BLADE BOVIE INSULATED COATED 2.75IN

## (undated) DEVICE — GOWN POLY REINF BRTH SLV XL

## (undated) DEVICE — SPONGE GAUZE 4X4 12 PLY STL AMD 10/TRAY

## (undated) DEVICE — 14MM DRILL BIT

## (undated) DEVICE — MATRIX FLOSEAL HEMOSTATIC 10ML

## (undated) DEVICE — DRILL NEURO SOFT TOUCH 2X3.1MM

## (undated) DEVICE — BANDAGE KERLIX AMD  4.5IN  SPONGE ROLL

## (undated) DEVICE — SYRINGE 10-12CC LURE -LOK TIP

## (undated) DEVICE — SUTURE ETHILON 2-0 18 BLACK FS

## (undated) DEVICE — DISPOSABLE DRILL BIT

## (undated) DEVICE — DRESSING TRANS 4X4 TEGADERM

## (undated) DEVICE — COLLAR CERV LO-CNTOUR ADLT LG

## (undated) DEVICE — SUT STRATAFIX PDS 2-0 SH 5IN

## (undated) DEVICE — CDS EXTREMITY

## (undated) DEVICE — SOL IRRIGATION SALINE 0.9% 1000ML BOTTLE

## (undated) DEVICE — SUTURE STRATAFIX CP-2 24X24

## (undated) DEVICE — DRAPE C-ARMOR EQUIPMENT COVER

## (undated) DEVICE — GLOVE PROTEXIS PI SYN SURG 8.5

## (undated) DEVICE — GLOVE SENSICARE PI SURG 7

## (undated) DEVICE — DRAPE THREE-QTR REINF 53X77IN

## (undated) DEVICE — SUTURE STRATAFIX CP-2 24CM X 24CX

## (undated) DEVICE — TRAY SKIN PREP PROVIDONE IODINE STERILE LATEX FREE

## (undated) DEVICE — GLOVE SURGICAL PROTEXIS PI BLUE SIZE 8.0

## (undated) DEVICE — SOL NACL IRR 1000ML BTL

## (undated) DEVICE — U-BAR CHESTPACK III

## (undated) DEVICE — UBAR CHEST PACK III

## (undated) DEVICE — GLOVE SURGICAL PROTEXIS PI SIZE 6

## (undated) DEVICE — CATH IV INTROCAN 14G X 2.

## (undated) DEVICE — SYS CLSR DERMABOND PRINEO 22CM

## (undated) DEVICE — APPLICATOR CHLORAPREP ORN 26ML

## (undated) DEVICE — FOAM POSITIONER ARM SURGICAL

## (undated) DEVICE — SUT STRATAFIX 1 PDS CT-1

## (undated) DEVICE — NEEDLE ECLIPSE 25GX1 1/2 IN

## (undated) DEVICE — GAUZE SPONGE PEANUT STRL

## (undated) DEVICE — KIT EVACUATOR 3 SPR  DRN 400CC

## (undated) DEVICE — GLOVE SENSICARE PI GRN 6.5

## (undated) DEVICE — GLOVE SENSICARE PI GRN 8.5